# Patient Record
Sex: MALE | Race: WHITE | NOT HISPANIC OR LATINO | URBAN - METROPOLITAN AREA
[De-identification: names, ages, dates, MRNs, and addresses within clinical notes are randomized per-mention and may not be internally consistent; named-entity substitution may affect disease eponyms.]

---

## 2017-10-02 ENCOUNTER — EMERGENCY (EMERGENCY)
Facility: HOSPITAL | Age: 82
LOS: 0 days | Discharge: HOME | End: 2017-10-02
Attending: EMERGENCY MEDICINE | Admitting: EMERGENCY MEDICINE
Payer: MEDICARE

## 2017-10-02 VITALS
OXYGEN SATURATION: 98 % | HEART RATE: 95 BPM | SYSTOLIC BLOOD PRESSURE: 158 MMHG | RESPIRATION RATE: 18 BRPM | HEIGHT: 67 IN | WEIGHT: 153 LBS | TEMPERATURE: 98 F | DIASTOLIC BLOOD PRESSURE: 79 MMHG

## 2017-10-02 VITALS — WEIGHT: 179.9 LBS

## 2017-10-02 DIAGNOSIS — Z79.82 LONG TERM (CURRENT) USE OF ASPIRIN: ICD-10-CM

## 2017-10-02 DIAGNOSIS — S01.511A LACERATION WITHOUT FOREIGN BODY OF LIP, INITIAL ENCOUNTER: ICD-10-CM
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
## 2017-10-02 DIAGNOSIS — S01.81XA LACERATION WITHOUT FOREIGN BODY OF OTHER PART OF HEAD, INITIAL ENCOUNTER: ICD-10-CM
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
## 2017-10-02 PROCEDURE — 99285 EMERGENCY DEPT VISIT HI MDM: CPT

## 2017-10-02 PROCEDURE — 70450 CT HEAD/BRAIN W/O DYE: CPT | Mod: 26

## 2017-10-02 NOTE — ED ADULT NURSE NOTE - OBJECTIVE STATEMENT
pt c/o trip and fall onto sidewalk, denies LOC, +upper lip laceration, bleeding controlled, cid plastic sx @ bedside

## 2017-10-02 NOTE — ED STATDOCS - CARE PLAN
Principal Discharge DX:	Fall, initial encounter  Secondary Diagnosis:	Lip laceration, initial encounter  Secondary Diagnosis:	Facial injury, initial encounter

## 2017-10-02 NOTE — ED STATDOCS - OBJECTIVE STATEMENT
89M presents to ED s/p fall. Pt tripped and fell, landed on face. No loc, no back pain. +laceration lip vermillion border. Pain 3/10. No dental injuries.

## 2017-10-02 NOTE — ED STATDOCS - PROGRESS NOTE DETAILS
89 yo male presents s/p trip and fall and landing face first with a lac to the right upper lip. Dr. Bustillos in  ER to see pt. -Yvon Bilyl PA-C

## 2017-11-05 ENCOUNTER — EMERGENCY (EMERGENCY)
Facility: HOSPITAL | Age: 82
LOS: 1 days | Discharge: HOME | End: 2017-11-05
Attending: EMERGENCY MEDICINE | Admitting: EMERGENCY MEDICINE
Payer: MEDICARE

## 2017-11-05 VITALS
SYSTOLIC BLOOD PRESSURE: 169 MMHG | RESPIRATION RATE: 18 BRPM | OXYGEN SATURATION: 97 % | DIASTOLIC BLOOD PRESSURE: 91 MMHG | TEMPERATURE: 98 F | HEIGHT: 67 IN | WEIGHT: 153.22 LBS | HEART RATE: 105 BPM

## 2017-11-05 VITALS
RESPIRATION RATE: 16 BRPM | DIASTOLIC BLOOD PRESSURE: 78 MMHG | HEART RATE: 91 BPM | SYSTOLIC BLOOD PRESSURE: 127 MMHG | OXYGEN SATURATION: 97 % | TEMPERATURE: 98 F

## 2017-11-05 LAB
APPEARANCE UR: CLEAR — SIGNIFICANT CHANGE UP
BILIRUB UR-MCNC: NEGATIVE — SIGNIFICANT CHANGE UP
COLOR SPEC: YELLOW — SIGNIFICANT CHANGE UP
DIFF PNL FLD: NEGATIVE — SIGNIFICANT CHANGE UP
GLUCOSE UR QL: NEGATIVE — SIGNIFICANT CHANGE UP
KETONES UR-MCNC: NEGATIVE — SIGNIFICANT CHANGE UP
LEUKOCYTE ESTERASE UR-ACNC: NEGATIVE — SIGNIFICANT CHANGE UP
NITRITE UR-MCNC: NEGATIVE — SIGNIFICANT CHANGE UP
PH UR: 6.5 — SIGNIFICANT CHANGE UP (ref 5–8)
PROT UR-MCNC: NEGATIVE — SIGNIFICANT CHANGE UP
SP GR SPEC: 1.01 — SIGNIFICANT CHANGE UP (ref 1.01–1.02)
UROBILINOGEN FLD QL: NEGATIVE — SIGNIFICANT CHANGE UP

## 2017-11-05 PROCEDURE — 81003 URINALYSIS AUTO W/O SCOPE: CPT

## 2017-11-05 PROCEDURE — 99284 EMERGENCY DEPT VISIT MOD MDM: CPT

## 2017-11-05 PROCEDURE — 99284 EMERGENCY DEPT VISIT MOD MDM: CPT | Mod: 25

## 2017-11-05 PROCEDURE — 74176 CT ABD & PELVIS W/O CONTRAST: CPT

## 2017-11-05 PROCEDURE — 87086 URINE CULTURE/COLONY COUNT: CPT

## 2017-11-05 PROCEDURE — 74176 CT ABD & PELVIS W/O CONTRAST: CPT | Mod: 26

## 2017-11-05 NOTE — ED PROVIDER NOTE - PROGRESS NOTE DETAILS
bladder scan and ct scan reviwed, no acute findings, patient states he had blood work done recently and had normal renal function, patient in the ER able to urinate well, feels better, states he will f/u with his urologist

## 2017-11-05 NOTE — ED PROVIDER NOTE - OBJECTIVE STATEMENT
89 male presents to ER with wife c/o difficulty urinating, states he has urgency but poor flow and suprapubic discomfort, started last night, deneis fever, no vomiting. PAtient states he was recently diagnosed with enlarged prostate and started flomax.

## 2017-11-06 LAB
CULTURE RESULTS: NO GROWTH — SIGNIFICANT CHANGE UP
SPECIMEN SOURCE: SIGNIFICANT CHANGE UP

## 2018-07-26 ENCOUNTER — EMERGENCY (EMERGENCY)
Facility: HOSPITAL | Age: 83
LOS: 1 days | Discharge: HOME | End: 2018-07-26
Attending: EMERGENCY MEDICINE
Payer: MEDICARE

## 2018-07-26 VITALS
HEART RATE: 86 BPM | OXYGEN SATURATION: 99 % | HEIGHT: 67 IN | SYSTOLIC BLOOD PRESSURE: 190 MMHG | RESPIRATION RATE: 15 BRPM | TEMPERATURE: 98 F | WEIGHT: 153 LBS | DIASTOLIC BLOOD PRESSURE: 95 MMHG

## 2018-07-26 VITALS
SYSTOLIC BLOOD PRESSURE: 184 MMHG | OXYGEN SATURATION: 99 % | TEMPERATURE: 98 F | HEART RATE: 83 BPM | DIASTOLIC BLOOD PRESSURE: 90 MMHG | RESPIRATION RATE: 14 BRPM

## 2018-07-26 DIAGNOSIS — Z90.79 ACQUIRED ABSENCE OF OTHER GENITAL ORGAN(S): Chronic | ICD-10-CM

## 2018-07-26 PROCEDURE — 99282 EMERGENCY DEPT VISIT SF MDM: CPT

## 2018-07-26 PROCEDURE — 99283 EMERGENCY DEPT VISIT LOW MDM: CPT

## 2018-07-26 NOTE — ED ADULT NURSE NOTE - OBJECTIVE STATEMENT
Pt presents to the fast track area c/o urinary retention, lower abd pressure. Lower abd region soft.

## 2018-07-26 NOTE — ED PROVIDER NOTE - OBJECTIVE STATEMENT
89 yo white male with PHx of TURP, BPH and HTN who over the past few days has began to notice a reduction in urinary stream with difficulty voiding especially today. Was seen by his urologist Dr. Fraser this week who Rx AbXs. No fever or chills and no flank pain, dysuria or hematuria. 89 yo white male with PHx of TURP, BPH and HTN who over the past few days has began to notice a reduction in urinary stream with difficulty voiding especially today. Was seen by his urologist Dr. Cruz this week who Rx AbXs. No fever or chills and no flank pain, dysuria or hematuria.

## 2018-07-26 NOTE — ED PROVIDER NOTE - CONSTITUTIONAL, MLM
normal... Well appearing, elderly white male, well nourished, awake, alert, oriented to person, place, time/situation and in no apparent distress.

## 2018-07-27 PROBLEM — N40.0 BENIGN PROSTATIC HYPERPLASIA WITHOUT LOWER URINARY TRACT SYMPTOMS: Chronic | Status: ACTIVE | Noted: 2017-11-05

## 2018-07-27 PROBLEM — I10 ESSENTIAL (PRIMARY) HYPERTENSION: Chronic | Status: ACTIVE | Noted: 2017-11-05

## 2018-08-04 ENCOUNTER — EMERGENCY (EMERGENCY)
Facility: HOSPITAL | Age: 83
LOS: 1 days | Discharge: LEFT BEFORE DISCHARGE | End: 2018-08-04
Attending: EMERGENCY MEDICINE
Payer: MEDICARE

## 2018-08-04 VITALS
RESPIRATION RATE: 16 BRPM | SYSTOLIC BLOOD PRESSURE: 168 MMHG | HEIGHT: 67 IN | TEMPERATURE: 98 F | OXYGEN SATURATION: 96 % | WEIGHT: 154.98 LBS | HEART RATE: 85 BPM | DIASTOLIC BLOOD PRESSURE: 88 MMHG

## 2018-08-04 DIAGNOSIS — R33.9 RETENTION OF URINE, UNSPECIFIED: ICD-10-CM

## 2018-08-04 DIAGNOSIS — Z90.79 ACQUIRED ABSENCE OF OTHER GENITAL ORGAN(S): Chronic | ICD-10-CM

## 2018-08-04 DIAGNOSIS — I10 ESSENTIAL (PRIMARY) HYPERTENSION: ICD-10-CM

## 2018-08-04 DIAGNOSIS — N40.0 BENIGN PROSTATIC HYPERPLASIA WITHOUT LOWER URINARY TRACT SYMPTOMS: ICD-10-CM

## 2018-08-04 PROCEDURE — 99283 EMERGENCY DEPT VISIT LOW MDM: CPT

## 2018-08-04 PROCEDURE — 99282 EMERGENCY DEPT VISIT SF MDM: CPT

## 2018-08-04 NOTE — ED PROVIDER NOTE - PROGRESS NOTE DETAILS
Had an at length discussion with patient.  Patient wishes to leave at this time without urine or labs testing.  The patient understands that they are leaving against medical advice despite the risk of missing a potentially serious diagnosis which may lead to injury, disability and/or death.  I discussed with the patient which tests would need to be performed.  I was unable to convince patient to stay for further workup.  The patient was given an opportunity to ask any questions as well.   Patient didn't want to wait for discharge papers. The patient demonstrates understanding of all risks, is awake and alert and demonstrates competance to make medical decisions.  The patient understands that they may return at any time if desired. Discussed the importance of close, prompt medical follow-up

## 2018-08-04 NOTE — ED ADULT NURSE NOTE - OBJECTIVE STATEMENT
pt states he isnt making a lot of urine, state he has h/o turp x2 and BPH. pt bladder soft non distended mildly tender to palpation

## 2018-08-04 NOTE — ED ADULT NURSE NOTE - NSIMPLEMENTINTERV_GEN_ALL_ED
Implemented All Universal Safety Interventions:  [place] to call system. Call bell, personal items and telephone within reach. Instruct patient to call for assistance. Room bathroom lighting operational. Non-slip footwear when patient is off stretcher. Physically safe environment: no spills, clutter or unnecessary equipment. Stretcher in lowest position, wheels locked, appropriate side rails in place.

## 2018-08-04 NOTE — ED PROVIDER NOTE - OBJECTIVE STATEMENT
pt c/o decreased urination since yesterday, unable to urinate today. no fevers, chills, d/n/v, abd pain, dysuria, hematuria.  uro - cardenas

## 2018-08-04 NOTE — ED ADULT NURSE REASSESSMENT NOTE - NS ED NURSE REASSESS COMMENT FT1
bladder scan completed 100ml urine in the bladder pt states his urine output is low and he goes very little when he does go. MD Smith aware  awaiting orders

## 2018-08-11 ENCOUNTER — EMERGENCY (EMERGENCY)
Facility: HOSPITAL | Age: 83
LOS: 1 days | Discharge: HOME | End: 2018-08-11
Attending: EMERGENCY MEDICINE
Payer: MEDICARE

## 2018-08-11 VITALS
DIASTOLIC BLOOD PRESSURE: 78 MMHG | RESPIRATION RATE: 16 BRPM | OXYGEN SATURATION: 98 % | SYSTOLIC BLOOD PRESSURE: 148 MMHG | TEMPERATURE: 97 F

## 2018-08-11 VITALS
DIASTOLIC BLOOD PRESSURE: 80 MMHG | RESPIRATION RATE: 16 BRPM | SYSTOLIC BLOOD PRESSURE: 159 MMHG | WEIGHT: 151.9 LBS | TEMPERATURE: 98 F | OXYGEN SATURATION: 97 % | HEIGHT: 67 IN | HEART RATE: 88 BPM

## 2018-08-11 DIAGNOSIS — Z90.79 ACQUIRED ABSENCE OF OTHER GENITAL ORGAN(S): Chronic | ICD-10-CM

## 2018-08-11 LAB
APPEARANCE UR: CLEAR — SIGNIFICANT CHANGE UP
BILIRUB UR-MCNC: NEGATIVE — SIGNIFICANT CHANGE UP
COLOR SPEC: YELLOW — SIGNIFICANT CHANGE UP
DIFF PNL FLD: ABNORMAL
GLUCOSE UR QL: NEGATIVE — SIGNIFICANT CHANGE UP
KETONES UR-MCNC: NEGATIVE — SIGNIFICANT CHANGE UP
LEUKOCYTE ESTERASE UR-ACNC: NEGATIVE — SIGNIFICANT CHANGE UP
NITRITE UR-MCNC: NEGATIVE — SIGNIFICANT CHANGE UP
PH UR: 5 — SIGNIFICANT CHANGE UP (ref 5–8)
PROT UR-MCNC: 25 MG/DL
SP GR SPEC: 1.02 — SIGNIFICANT CHANGE UP (ref 1.01–1.02)
UROBILINOGEN FLD QL: NEGATIVE — SIGNIFICANT CHANGE UP

## 2018-08-11 PROCEDURE — 99284 EMERGENCY DEPT VISIT MOD MDM: CPT

## 2018-08-11 PROCEDURE — 87086 URINE CULTURE/COLONY COUNT: CPT

## 2018-08-11 PROCEDURE — 99282 EMERGENCY DEPT VISIT SF MDM: CPT

## 2018-08-11 PROCEDURE — 81001 URINALYSIS AUTO W/SCOPE: CPT

## 2018-08-11 NOTE — ED PROVIDER NOTE - OBJECTIVE STATEMENT
89 yo white male with feeling of urgency on and off x weeks, Scheduled to see Urologist on Monday. No dysuria and no F/C/N/V/D.

## 2018-08-11 NOTE — ED ADULT NURSE NOTE - NSIMPLEMENTINTERV_GEN_ALL_ED
Implemented All Universal Safety Interventions:  [place] to call system. Call bell, personal items and telephone within reach. Instruct patient to call for assistance. Room bathroom lighting operational. Non-slip footwear when patient is off stretcher. Physically safe environment: no spills, clutter or unnecessary equipment. Stretcher in lowest position, wheels locked, appropriate side rails in place.

## 2018-08-12 LAB
CULTURE RESULTS: NO GROWTH — SIGNIFICANT CHANGE UP
SPECIMEN SOURCE: SIGNIFICANT CHANGE UP

## 2018-10-18 NOTE — ED PROVIDER NOTE - NS ED ATTENDING STATEMENT MOD
See hospital course. Suspect viral meningitis but work up still in progress. Slowly improving.   Attending Only

## 2018-10-30 ENCOUNTER — APPOINTMENT (OUTPATIENT)
Dept: OTOLARYNGOLOGY | Facility: CLINIC | Age: 83
End: 2018-10-30
Payer: MEDICARE

## 2018-10-30 VITALS
WEIGHT: 152 LBS | BODY MASS INDEX: 23.86 KG/M2 | HEIGHT: 67 IN | SYSTOLIC BLOOD PRESSURE: 115 MMHG | HEART RATE: 76 BPM | DIASTOLIC BLOOD PRESSURE: 68 MMHG

## 2018-10-30 DIAGNOSIS — H61.20 IMPACTED CERUMEN, UNSPECIFIED EAR: ICD-10-CM

## 2018-10-30 DIAGNOSIS — Z86.39 PERSONAL HISTORY OF OTHER ENDOCRINE, NUTRITIONAL AND METABOLIC DISEASE: ICD-10-CM

## 2018-10-30 DIAGNOSIS — Z78.9 OTHER SPECIFIED HEALTH STATUS: ICD-10-CM

## 2018-10-30 DIAGNOSIS — Z82.49 FAMILY HISTORY OF ISCHEMIC HEART DISEASE AND OTHER DISEASES OF THE CIRCULATORY SYSTEM: ICD-10-CM

## 2018-10-30 DIAGNOSIS — Z80.9 FAMILY HISTORY OF MALIGNANT NEOPLASM, UNSPECIFIED: ICD-10-CM

## 2018-10-30 PROCEDURE — 99213 OFFICE O/P EST LOW 20 MIN: CPT

## 2018-10-30 RX ORDER — NIFEDIPINE 10 MG/1
10 CAPSULE ORAL
Refills: 0 | Status: ACTIVE | COMMUNITY

## 2018-10-30 RX ORDER — TAMSULOSIN HYDROCHLORIDE 0.4 MG/1
0.4 CAPSULE ORAL
Refills: 0 | Status: ACTIVE | COMMUNITY

## 2018-10-30 RX ORDER — ATORVASTATIN CALCIUM 80 MG/1
TABLET, FILM COATED ORAL
Refills: 0 | Status: ACTIVE | COMMUNITY

## 2018-10-30 RX ORDER — LOSARTAN POTASSIUM AND HYDROCHLOROTHIAZIDE 12.5; 5 MG/1; MG/1
50-12.5 TABLET ORAL
Refills: 0 | Status: ACTIVE | COMMUNITY

## 2018-10-30 RX ORDER — FINASTERIDE 1 MG/1
TABLET ORAL
Refills: 0 | Status: ACTIVE | COMMUNITY

## 2019-04-03 ENCOUNTER — APPOINTMENT (OUTPATIENT)
Dept: OTOLARYNGOLOGY | Facility: CLINIC | Age: 84
End: 2019-04-03
Payer: MEDICARE

## 2019-04-03 VITALS
HEART RATE: 87 BPM | HEIGHT: 67 IN | BODY MASS INDEX: 23.86 KG/M2 | DIASTOLIC BLOOD PRESSURE: 86 MMHG | SYSTOLIC BLOOD PRESSURE: 141 MMHG | WEIGHT: 152 LBS

## 2019-04-03 DIAGNOSIS — J06.9 ACUTE UPPER RESPIRATORY INFECTION, UNSPECIFIED: ICD-10-CM

## 2019-04-03 DIAGNOSIS — J32.2 CHRONIC ETHMOIDAL SINUSITIS: ICD-10-CM

## 2019-04-03 DIAGNOSIS — R05 COUGH: ICD-10-CM

## 2019-04-03 PROCEDURE — 99213 OFFICE O/P EST LOW 20 MIN: CPT | Mod: 25

## 2019-04-03 PROCEDURE — 31231 NASAL ENDOSCOPY DX: CPT

## 2019-04-03 RX ORDER — METOPROLOL SUCCINATE 50 MG/1
50 TABLET, EXTENDED RELEASE ORAL
Qty: 90 | Refills: 0 | Status: ACTIVE | COMMUNITY
Start: 2018-10-05

## 2019-04-03 RX ORDER — OLOPATADINE HCL 1 MG/ML
0.1 SOLUTION/ DROPS OPHTHALMIC
Qty: 5 | Refills: 0 | Status: ACTIVE | COMMUNITY
Start: 2018-12-28

## 2019-04-03 RX ORDER — NIFEDIPINE 60 MG/1
60 TABLET, FILM COATED, EXTENDED RELEASE ORAL
Qty: 90 | Refills: 0 | Status: ACTIVE | COMMUNITY
Start: 2019-03-04

## 2019-04-03 NOTE — PROCEDURE
[FreeTextEntry6] : scope # 13\par Mild septal deviation is present on direct visualization\par The inferior nasal turbinates are moderate in size. \par The sinus endoscope was introduced into the right nares\par exam right middle meatus reveals no mucopus, polyps or inflammation.\par The scope was advanced and the sphenoethmoid region was inspected. left sphenoidotmy patent and clear\par The superior meatus and nasal vault are unremarkable.  The nasopharynx is unremarkable without inflammation or mass\par The sinus endoscope was introduced into the left nares\par exam of the left middle meatus reveals no mucopus, polyps or inflammation\par The scope was advanced and the sphenoethmoid region was inspected.\par The left superior meatus and nasal vault are unremarkable.\par

## 2019-04-03 NOTE — REVIEW OF SYSTEMS
[Cough] : cough [Negative] : Heme/Lymph [Patient Intake Form Reviewed] : Patient intake form was reviewed

## 2019-04-03 NOTE — PHYSICAL EXAM
[Nasal Endoscopy Performed] : nasal endoscopy was performed, see procedure section for findings [Midline] : trachea located in midline position [Normal] : no rashes [de-identified] : lungs clear to ascultation

## 2019-06-20 ENCOUNTER — INPATIENT (INPATIENT)
Facility: HOSPITAL | Age: 84
LOS: 9 days | Discharge: HOME | DRG: 329 | End: 2019-06-30
Attending: FAMILY MEDICINE | Admitting: INTERNAL MEDICINE
Payer: MEDICARE

## 2019-06-20 ENCOUNTER — TRANSCRIPTION ENCOUNTER (OUTPATIENT)
Age: 84
End: 2019-06-20

## 2019-06-20 VITALS
DIASTOLIC BLOOD PRESSURE: 72 MMHG | RESPIRATION RATE: 14 BRPM | OXYGEN SATURATION: 95 % | TEMPERATURE: 98 F | SYSTOLIC BLOOD PRESSURE: 131 MMHG | HEART RATE: 105 BPM | HEIGHT: 67 IN | WEIGHT: 151.02 LBS

## 2019-06-20 DIAGNOSIS — K56.2 VOLVULUS: ICD-10-CM

## 2019-06-20 DIAGNOSIS — Z98.890 OTHER SPECIFIED POSTPROCEDURAL STATES: Chronic | ICD-10-CM

## 2019-06-20 DIAGNOSIS — R18.8 OTHER ASCITES: ICD-10-CM

## 2019-06-20 DIAGNOSIS — I25.10 ATHEROSCLEROTIC HEART DISEASE OF NATIVE CORONARY ARTERY WITHOUT ANGINA PECTORIS: ICD-10-CM

## 2019-06-20 DIAGNOSIS — I10 ESSENTIAL (PRIMARY) HYPERTENSION: ICD-10-CM

## 2019-06-20 DIAGNOSIS — E87.1 HYPO-OSMOLALITY AND HYPONATREMIA: ICD-10-CM

## 2019-06-20 DIAGNOSIS — Z90.79 ACQUIRED ABSENCE OF OTHER GENITAL ORGAN(S): Chronic | ICD-10-CM

## 2019-06-20 DIAGNOSIS — E78.5 HYPERLIPIDEMIA, UNSPECIFIED: ICD-10-CM

## 2019-06-20 DIAGNOSIS — J90 PLEURAL EFFUSION, NOT ELSEWHERE CLASSIFIED: ICD-10-CM

## 2019-06-20 DIAGNOSIS — Z29.9 ENCOUNTER FOR PROPHYLACTIC MEASURES, UNSPECIFIED: ICD-10-CM

## 2019-06-20 DIAGNOSIS — R10.9 UNSPECIFIED ABDOMINAL PAIN: ICD-10-CM

## 2019-06-20 DIAGNOSIS — K46.9 UNSPECIFIED ABDOMINAL HERNIA WITHOUT OBSTRUCTION OR GANGRENE: Chronic | ICD-10-CM

## 2019-06-20 DIAGNOSIS — N40.0 BENIGN PROSTATIC HYPERPLASIA WITHOUT LOWER URINARY TRACT SYMPTOMS: ICD-10-CM

## 2019-06-20 LAB
ALBUMIN SERPL ELPH-MCNC: 3.2 G/DL — LOW (ref 3.3–5)
ALP SERPL-CCNC: 64 U/L — SIGNIFICANT CHANGE UP (ref 40–120)
ALT FLD-CCNC: 52 U/L — SIGNIFICANT CHANGE UP (ref 12–78)
ANION GAP SERPL CALC-SCNC: 8 MMOL/L — SIGNIFICANT CHANGE UP (ref 5–17)
APPEARANCE UR: CLEAR — SIGNIFICANT CHANGE UP
APTT BLD: 25.9 SEC — LOW (ref 28.5–37)
AST SERPL-CCNC: 48 U/L — HIGH (ref 15–37)
BASOPHILS # BLD AUTO: 0.01 K/UL — SIGNIFICANT CHANGE UP (ref 0–0.2)
BASOPHILS NFR BLD AUTO: 0.1 % — SIGNIFICANT CHANGE UP (ref 0–2)
BILIRUB SERPL-MCNC: 0.6 MG/DL — SIGNIFICANT CHANGE UP (ref 0.2–1.2)
BILIRUB UR-MCNC: NEGATIVE — SIGNIFICANT CHANGE UP
BUN SERPL-MCNC: 37 MG/DL — HIGH (ref 7–23)
CALCIUM SERPL-MCNC: 8.8 MG/DL — SIGNIFICANT CHANGE UP (ref 8.5–10.1)
CHLORIDE SERPL-SCNC: 94 MMOL/L — LOW (ref 96–108)
CK MB CFR SERPL CALC: 5 NG/ML — HIGH (ref 0–3.6)
CO2 SERPL-SCNC: 27 MMOL/L — SIGNIFICANT CHANGE UP (ref 22–31)
COLOR SPEC: YELLOW — SIGNIFICANT CHANGE UP
CREAT SERPL-MCNC: 1.1 MG/DL — SIGNIFICANT CHANGE UP (ref 0.5–1.3)
DIFF PNL FLD: ABNORMAL
EOSINOPHIL # BLD AUTO: 0.01 K/UL — SIGNIFICANT CHANGE UP (ref 0–0.5)
EOSINOPHIL NFR BLD AUTO: 0.1 % — SIGNIFICANT CHANGE UP (ref 0–6)
GLUCOSE SERPL-MCNC: 107 MG/DL — HIGH (ref 70–99)
GLUCOSE UR QL: NEGATIVE — SIGNIFICANT CHANGE UP
HCT VFR BLD CALC: 35.5 % — LOW (ref 39–50)
HGB BLD-MCNC: 12.1 G/DL — LOW (ref 13–17)
IMM GRANULOCYTES NFR BLD AUTO: 0.3 % — SIGNIFICANT CHANGE UP (ref 0–1.5)
INR BLD: 1.01 RATIO — SIGNIFICANT CHANGE UP (ref 0.88–1.16)
KETONES UR-MCNC: NEGATIVE — SIGNIFICANT CHANGE UP
LACTATE SERPL-SCNC: 1.1 MMOL/L — SIGNIFICANT CHANGE UP (ref 0.7–2)
LEUKOCYTE ESTERASE UR-ACNC: NEGATIVE — SIGNIFICANT CHANGE UP
LIDOCAIN IGE QN: 110 U/L — SIGNIFICANT CHANGE UP (ref 73–393)
LYMPHOCYTES # BLD AUTO: 0.89 K/UL — LOW (ref 1–3.3)
LYMPHOCYTES # BLD AUTO: 8.6 % — LOW (ref 13–44)
MCHC RBC-ENTMCNC: 32.2 PG — SIGNIFICANT CHANGE UP (ref 27–34)
MCHC RBC-ENTMCNC: 34.1 GM/DL — SIGNIFICANT CHANGE UP (ref 32–36)
MCV RBC AUTO: 94.4 FL — SIGNIFICANT CHANGE UP (ref 80–100)
MONOCYTES # BLD AUTO: 0.81 K/UL — SIGNIFICANT CHANGE UP (ref 0–0.9)
MONOCYTES NFR BLD AUTO: 7.9 % — SIGNIFICANT CHANGE UP (ref 2–14)
NEUTROPHILS # BLD AUTO: 8.54 K/UL — HIGH (ref 1.8–7.4)
NEUTROPHILS NFR BLD AUTO: 83 % — HIGH (ref 43–77)
NITRITE UR-MCNC: NEGATIVE — SIGNIFICANT CHANGE UP
NRBC # BLD: 0 /100 WBCS — SIGNIFICANT CHANGE UP (ref 0–0)
NT-PROBNP SERPL-SCNC: 758 PG/ML — HIGH (ref 0–450)
PH UR: 6.5 — SIGNIFICANT CHANGE UP (ref 5–8)
PLATELET # BLD AUTO: 234 K/UL — SIGNIFICANT CHANGE UP (ref 150–400)
POTASSIUM SERPL-MCNC: 4.2 MMOL/L — SIGNIFICANT CHANGE UP (ref 3.5–5.3)
POTASSIUM SERPL-SCNC: 4.2 MMOL/L — SIGNIFICANT CHANGE UP (ref 3.5–5.3)
PROT SERPL-MCNC: 7.3 G/DL — SIGNIFICANT CHANGE UP (ref 6–8.3)
PROT UR-MCNC: 25 MG/DL
PROTHROM AB SERPL-ACNC: 11.5 SEC — SIGNIFICANT CHANGE UP (ref 10–12.9)
RBC # BLD: 3.76 M/UL — LOW (ref 4.2–5.8)
RBC # FLD: 14 % — SIGNIFICANT CHANGE UP (ref 10.3–14.5)
SODIUM SERPL-SCNC: 129 MMOL/L — LOW (ref 135–145)
SP GR SPEC: 1 — LOW (ref 1.01–1.02)
TROPONIN I SERPL-MCNC: <.015 NG/ML — SIGNIFICANT CHANGE UP (ref 0.01–0.04)
UROBILINOGEN FLD QL: NEGATIVE — SIGNIFICANT CHANGE UP
WBC # BLD: 10.29 K/UL — SIGNIFICANT CHANGE UP (ref 3.8–10.5)
WBC # FLD AUTO: 10.29 K/UL — SIGNIFICANT CHANGE UP (ref 3.8–10.5)

## 2019-06-20 PROCEDURE — 93010 ELECTROCARDIOGRAM REPORT: CPT

## 2019-06-20 PROCEDURE — 99285 EMERGENCY DEPT VISIT HI MDM: CPT

## 2019-06-20 PROCEDURE — 71045 X-RAY EXAM CHEST 1 VIEW: CPT | Mod: 26

## 2019-06-20 PROCEDURE — 74177 CT ABD & PELVIS W/CONTRAST: CPT | Mod: 26

## 2019-06-20 PROCEDURE — 99223 1ST HOSP IP/OBS HIGH 75: CPT | Mod: GC,AI

## 2019-06-20 RX ORDER — CEFTRIAXONE 500 MG/1
1000 INJECTION, POWDER, FOR SOLUTION INTRAMUSCULAR; INTRAVENOUS ONCE
Refills: 0 | Status: COMPLETED | OUTPATIENT
Start: 2019-06-20 | End: 2019-06-20

## 2019-06-20 RX ORDER — MORPHINE SULFATE 50 MG/1
1 CAPSULE, EXTENDED RELEASE ORAL EVERY 6 HOURS
Refills: 0 | Status: DISCONTINUED | OUTPATIENT
Start: 2019-06-20 | End: 2019-06-21

## 2019-06-20 RX ORDER — ATORVASTATIN CALCIUM 80 MG/1
0 TABLET, FILM COATED ORAL
Qty: 0 | Refills: 0 | DISCHARGE

## 2019-06-20 RX ORDER — SODIUM CHLORIDE 9 MG/ML
1000 INJECTION INTRAMUSCULAR; INTRAVENOUS; SUBCUTANEOUS
Refills: 0 | Status: DISCONTINUED | OUTPATIENT
Start: 2019-06-20 | End: 2019-06-21

## 2019-06-20 RX ORDER — NIFEDIPINE 30 MG
0 TABLET, EXTENDED RELEASE 24 HR ORAL
Qty: 0 | Refills: 0 | DISCHARGE

## 2019-06-20 RX ORDER — AZITHROMYCIN 500 MG/1
500 TABLET, FILM COATED ORAL ONCE
Refills: 0 | Status: COMPLETED | OUTPATIENT
Start: 2019-06-20 | End: 2019-06-20

## 2019-06-20 RX ORDER — ACETAMINOPHEN 500 MG
650 TABLET ORAL EVERY 6 HOURS
Refills: 0 | Status: DISCONTINUED | OUTPATIENT
Start: 2019-06-20 | End: 2019-06-21

## 2019-06-20 RX ORDER — TAMSULOSIN HYDROCHLORIDE 0.4 MG/1
0.4 CAPSULE ORAL AT BEDTIME
Refills: 0 | Status: DISCONTINUED | OUTPATIENT
Start: 2019-06-20 | End: 2019-06-21

## 2019-06-20 RX ORDER — NIFEDIPINE 30 MG
60 TABLET, EXTENDED RELEASE 24 HR ORAL DAILY
Refills: 0 | Status: DISCONTINUED | OUTPATIENT
Start: 2019-06-20 | End: 2019-06-21

## 2019-06-20 RX ORDER — LOSARTAN POTASSIUM 100 MG/1
0 TABLET, FILM COATED ORAL
Qty: 0 | Refills: 0 | DISCHARGE

## 2019-06-20 RX ORDER — ASPIRIN/CALCIUM CARB/MAGNESIUM 324 MG
81 TABLET ORAL DAILY
Refills: 0 | Status: DISCONTINUED | OUTPATIENT
Start: 2019-06-20 | End: 2019-06-20

## 2019-06-20 RX ORDER — FINASTERIDE 5 MG/1
0 TABLET, FILM COATED ORAL
Qty: 0 | Refills: 0 | DISCHARGE

## 2019-06-20 RX ORDER — METOPROLOL TARTRATE 50 MG
0 TABLET ORAL
Qty: 0 | Refills: 0 | DISCHARGE

## 2019-06-20 RX ORDER — METOPROLOL TARTRATE 50 MG
50 TABLET ORAL DAILY
Refills: 0 | Status: DISCONTINUED | OUTPATIENT
Start: 2019-06-20 | End: 2019-06-21

## 2019-06-20 RX ORDER — FINASTERIDE 5 MG/1
5 TABLET, FILM COATED ORAL DAILY
Refills: 0 | Status: DISCONTINUED | OUTPATIENT
Start: 2019-06-20 | End: 2019-06-21

## 2019-06-20 RX ORDER — HEPARIN SODIUM 5000 [USP'U]/ML
5000 INJECTION INTRAVENOUS; SUBCUTANEOUS EVERY 8 HOURS
Refills: 0 | Status: DISCONTINUED | OUTPATIENT
Start: 2019-06-20 | End: 2019-06-20

## 2019-06-20 RX ORDER — ATORVASTATIN CALCIUM 80 MG/1
80 TABLET, FILM COATED ORAL AT BEDTIME
Refills: 0 | Status: DISCONTINUED | OUTPATIENT
Start: 2019-06-20 | End: 2019-06-21

## 2019-06-20 RX ORDER — ONDANSETRON 8 MG/1
4 TABLET, FILM COATED ORAL EVERY 6 HOURS
Refills: 0 | Status: DISCONTINUED | OUTPATIENT
Start: 2019-06-20 | End: 2019-06-21

## 2019-06-20 RX ADMIN — TAMSULOSIN HYDROCHLORIDE 0.4 MILLIGRAM(S): 0.4 CAPSULE ORAL at 23:35

## 2019-06-20 RX ADMIN — CEFTRIAXONE 1000 MILLIGRAM(S): 500 INJECTION, POWDER, FOR SOLUTION INTRAMUSCULAR; INTRAVENOUS at 18:24

## 2019-06-20 RX ADMIN — AZITHROMYCIN 500 MILLIGRAM(S): 500 TABLET, FILM COATED ORAL at 19:30

## 2019-06-20 RX ADMIN — SODIUM CHLORIDE 80 MILLILITER(S): 9 INJECTION INTRAMUSCULAR; INTRAVENOUS; SUBCUTANEOUS at 23:38

## 2019-06-20 RX ADMIN — CEFTRIAXONE 100 MILLIGRAM(S): 500 INJECTION, POWDER, FOR SOLUTION INTRAMUSCULAR; INTRAVENOUS at 17:54

## 2019-06-20 RX ADMIN — AZITHROMYCIN 255 MILLIGRAM(S): 500 TABLET, FILM COATED ORAL at 18:25

## 2019-06-20 RX ADMIN — ATORVASTATIN CALCIUM 80 MILLIGRAM(S): 80 TABLET, FILM COATED ORAL at 23:37

## 2019-06-20 NOTE — ED PROVIDER NOTE - OBJECTIVE STATEMENT
89yo M h/o cad, cabg, htn p/w lack of appetite w/ nausea x 5 days. denies f/c/v/d. pt reports some constipation.

## 2019-06-20 NOTE — H&P ADULT - NSHPSOCIALHISTORY_GEN_ALL_CORE
Lives at home with his wife. Ambulates independently and can perform all ADLs.   Tobacco: never  Etoh: in moderation

## 2019-06-20 NOTE — ED ADULT NURSE NOTE - OBJECTIVE STATEMENT
received pt in bed #16b Pt A&O c/o abd pain, decreased appetite & nausea since Sunday night "I think I ate something bad...I took a fleets enema this morning & had a BM to make sure that wasn't the problem"

## 2019-06-20 NOTE — ED PROVIDER NOTE - NS ED ROS FT
GEN: awake, alert, well appearing, NAD   HENT: atraumatic, normocephalic, MARTA, EOMI, no midline instability, oropharynx w/o erythema or exudates, no lymphadenopathy  CV: normal rate and rhythm, S1, S2, no MRG, equal pulses throughout, no JVD  RESP: no distress, no IWOB, no retraction, clear to auscultation bilaterally   ABD: soft, nontender, nondistended, no rebound, no guarding, normoactive bowel sounds, no organomegally  MUSCULOSKELETAL: strenght 5/5 x 4, full range of motion, CMS intact   SKIN: normal color, no turgor, no wounds or rash   NEURO: Awake alert oriented x 3, no facial asymmetry, no slurred speech, no pronator drift, moving all extremities  PSYCH: no suicial ideation, no homicidal ideation, no depression, no anxiety, no hallucination

## 2019-06-20 NOTE — ED ADULT NURSE NOTE - NSIMPLEMENTINTERV_GEN_ALL_ED
Implemented All Universal Safety Interventions:  [place] to call system. Call bell, personal items and telephone within reach. Instruct patient to call for assistance. Room bathroom lighting operational. Non-slip footwear when patient is off stretcher. Physically safe environment: no spills, clutter or unnecessary equipment. Stretcher in lowest position, wheels locked, appropriate side rails in place.

## 2019-06-20 NOTE — H&P ADULT - PROBLEM SELECTOR PLAN 3
s/p CABG, 1995  - Continue asa 81mg  - Continue Toprol XL 50mg qd with hold parameters  - Cardio consult for optimization s/p CABG, 1995  - will hold ASA in event of patient needs surgical procedure.   - Continue Toprol XL 50mg qd with hold parameters  - Cardio consult ([facility] consulted) for optimization

## 2019-06-20 NOTE — H&P ADULT - PROBLEM SELECTOR PLAN 1
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 QTc 454  - Pain management with Tylenol and morphine prn  - Surgical consult- Dr. Tse

## 2019-06-20 NOTE — H&P ADULT - NSHPREVIEWOFSYSTEMS_GEN_ALL_CORE
Constitutional: denies fever, chills, diaphoresis   HEENT: denies blurry vision, difficulty hearing  Respiratory: denies SOB, JUAREZ, cough, sputum production, wheezing, hemoptysis  Cardiovascular: denies CP, palpitations, edema  Gastrointestinal: +abdominal pain, +nausea, denies vomiting, diarrhea, constipation   Genitourinary: +increased frequency, denies urgency, hematuria   Skin/Breast: denies rash, itching  Musculoskeletal: denies myalgias, joint swelling, muscle weakness  Neurologic: denies headache, weakness, dizziness, paresthesias, numbness/tingling  Psychiatric: denies feeling anxious, depressed Constitutional: denies fever, chills, diaphoresis   HEENT: denies blurry vision, difficulty hearing  Respiratory: denies SOB, JUAREZ, cough, sputum production, wheezing, hemoptysis  Cardiovascular: denies CP, palpitations, edema  Gastrointestinal: +abdominal pain, +nausea, denies vomiting, diarrhea, constipation, melena, hematochezia  Genitourinary: +increased frequency, denies urgency, hematuria   Skin/Breast: denies rash, itching  Musculoskeletal: denies myalgias, joint swelling, muscle weakness  Neurologic: denies headache, weakness, dizziness, paresthesias, numbness/tingling  Psychiatric: denies feeling anxious, depressed

## 2019-06-20 NOTE — H&P ADULT - ATTENDING COMMENTS
Agree with the above. Pt is a 89 yo M presenting with abdominal pain found to have  The cecum is dilated and folded on itself in the right upper   quadrant, changed in location from prior CT.   Patient abd pain improved. Dr. Tse of surgery notified and will evaluate patient. No urgent surgical intervention planned.   Denies headaches,  chest pain, SOB, palpitations, constipation, diarrhea, melena, hematochezia, dysuria. Had constipation previously, but used an fleet enema and had large BM today, passing flatus.     T(C): 37.2 (06-20-19 @ 20:50), Max: 37.3 (06-20-19 @ 18:00)  HR: 111 (06-20-19 @ 20:50) (105 - 118)  BP: 126/77 (06-20-19 @ 20:50) (126/77 - 132/71)  RR: 16 (06-20-19 @ 20:50) (14 - 16)  SpO2: 92% (06-20-19 @ 20:50) (92% - 95%)  Wt(kg): --    Physical Exam:   GENERAL: well-groomed, well-developed, NAD  HEENT: head NC/AT; EOM intact, conjunctiva & sclera clear; hearing grossly intact, moist mucous membranes  NECK: supple, no JVD  RESPIRATORY: CTA B/L, no wheezing, rales, rhonchi or rubs  CARDIOVASCULAR: S1&S2, RRR, no murmurs or gallops  ABDOMEN: soft, non-tender, non-distended, + Bowel sounds x4 quadrants, no guarding, rebound or rigidity  MUSCULOSKELETAL:  no clubbing, cyanosis or edema of all 4 extremities  LYMPH: no cervical lymphadenopathy  VASCULAR: Radial pulses 2+ bilaterally, no varicose veins   SKIN: warm and dry, color normal  NEUROLOGIC: AA&O X3, CN2-12 intact w/ no focal deficits, no sensory loss, motor Strength 5/5 in UE & LE B/L  Psych: Normal mood and affect, normal behavior    Plan:   Cecal volvulus: normal lactate, benign abd exam. CT scan reviewed. Dr. Tse of surgery to see patient.   -cardiac consult for hx of CAD  Anemia: no signs or symptoms of active bleeding. Continue to monitor hgb.   Hyponatremia: gentle IVF. Repeat in AM.   Remainder of problems as above. Agree with the above. Pt is a 89 yo M presenting with abdominal pain found to have  The cecum is dilated and folded on itself in the right upper   quadrant, changed in location from prior CT.   Patient abd pain improved. Dr. Tse of surgery notified and will evaluate patient. No urgent surgical intervention planned.   Denies headaches,  chest pain, SOB, palpitations, constipation, diarrhea, melena, hematochezia, dysuria. Had constipation previously, but used an fleet enema and had large BM today, passing flatus.     T(C): 37.2 (06-20-19 @ 20:50), Max: 37.3 (06-20-19 @ 18:00)  HR: 111 (06-20-19 @ 20:50) (105 - 118)  BP: 126/77 (06-20-19 @ 20:50) (126/77 - 132/71)  RR: 16 (06-20-19 @ 20:50) (14 - 16)  SpO2: 92% (06-20-19 @ 20:50) (92% - 95%)  Wt(kg): --    Physical Exam:   GENERAL: well-groomed, well-developed, NAD  HEENT: head NC/AT; EOM intact, conjunctiva & sclera clear; hearing grossly intact, moist mucous membranes  NECK: supple, no JVD  RESPIRATORY: CTA B/L, no wheezing, rales, rhonchi or rubs  CARDIOVASCULAR: S1&S2, RRR, no murmurs or gallops  ABDOMEN: soft, non-tender, non-distended, + Bowel sounds x4 quadrants, no guarding, rebound or rigidity  MUSCULOSKELETAL:  no clubbing, cyanosis or edema of all 4 extremities  LYMPH: no cervical lymphadenopathy  VASCULAR: Radial pulses 2+ bilaterally, no varicose veins   SKIN: warm and dry, color normal  NEUROLOGIC: AA&O X3, CN2-12 intact w/ no focal deficits, no sensory loss, motor Strength 5/5 in UE & LE B/L  Psych: Normal mood and affect, normal behavior    Plan:   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 CT scan reviewed. Dr. Tse of surgery to see patient tonight. Discussed with Dr. Tse.   -cardiac consult for hx of CAD  Anemia: no signs or symptoms of active bleeding. Continue to monitor hgb.   Hyponatremia: gentle IVF. Repeat in AM.   Remainder of problems as above.

## 2019-06-20 NOTE — ED PROVIDER NOTE - CLINICAL SUMMARY MEDICAL DECISION MAKING FREE TEXT BOX
abd distension,   ct shows large Bowel obstruction, markedly dilated cecum, concerning for underlying mass, sx consulted, will place ngt  pt admitted

## 2019-06-20 NOTE — H&P ADULT - NSICDXPASTMEDICALHX_GEN_ALL_CORE_FT
PAST MEDICAL HISTORY:  Anginal pain     CAD (coronary artery disease) s/p CABG, 1995    Enlarged prostate     HTN (hypertension)

## 2019-06-20 NOTE — H&P ADULT - HISTORY OF PRESENT ILLNESS
89M w/pmh of CAD s/p CABG (1995), b/l inguinal hernia repairs, enlarged prostate s/p TURP x2, HTN and HLD presenting with abdominal pain for 4 days. States that after dinner on Sunday, he had a sharp, periumbilical pain with associated nausea and decreased appetite. He thought that he had eaten something that upset his stomach. He takes Tylenol at night for prostate discomfort. Over the past few days, his abdominal pain has improved to a dull ache, 3/10 in severity, nonradiating located around umbilicus. He had constipation up until this morning when he had a large bm after a fleet enema. He is able to pass gas.  Denies fevers/chills, chest pain, palpitations, sob, dysuria, edema or weakness. Patient's last colonoscopy was 7 years ago, wnl.     In the ED, patient's vitals were: T98.5F, , /72, RR 14 and SpO2 95% on room air. Significant labs include: H/H 12.1/35.5, Na 129, trops neg x1, pro-. UA wnl. Pt received Azithromycin and Ceftriaxone.   CT A/P: he cecum is dilated and folded on itself in the right upper quadrant, changed in location from prior CT. There is wall thickening involving the terminal ileum, cecum and proximal ascending colon. Appendix is distended and air-filled measuring up to 1.3 cm at the tip.   CXR: No change heart mediastinum. Asymmetric elevation right hemidiaphragm. There is left basilar retrocardiac atelectasis/infiltrate. Correlate for active infection. Small left pleural effusion. Distended bowel visualized upper abdomen.  EKG: sinus tachycardia, , no ST elevation/depression

## 2019-06-20 NOTE — ED ADULT NURSE NOTE - CHPI ED NUR SYMPTOMS NEG
no dysuria/no hematuria/no fever/no burning urination/no chills/no abdominal distension/no blood in stool/no vomiting/no diarrhea

## 2019-06-20 NOTE — H&P ADULT - PROBLEM SELECTOR PLAN 4
CT A/P showed small right upper quadrant ascites.  - Consider CHF as proBNP elevated 758 CT A/P showed small right upper quadrant ascites.  - Consider CHF as proBNP elevated 758  - Check ECHO

## 2019-06-20 NOTE — PATIENT PROFILE ADULT - ARE SIGNIFICANT INDICATORS COMPLETE.
Daily Note     Today's date: 2018  Patient name: Michela Hoyos  : 1979  MRN: [id number]  Referring provider: Tomás Mariscal DO  Dx:   Encounter Diagnosis     ICD-10-CM    1  Strain of lumbar region, initial encounter S39 012A    2  Lumbar strain, initial encounter S39 012A                   Subjective:Pt reports minimal LBP at the start of therapy today  Objective: See treatment diary below  Updated home program       Assessment: Held some TE and MHP due to time constraints and pt late arrival   Tolerated all TE without c/o increased pain  Pt would benefit from continued therapy to maximize pain relief, and improve flexibility  Plan: Continue per plan of care  Progress treatment as tolerated           Precautions: none    Daily Treatment Diary  Manuals  6/1  6/4  6/11  6/18  6/25  7/11  8/3  8/10  8/16     HS - D/C, piriformis stretch  AN    NP  AN  RK  nv  AN  EV  nv                                                                             Exercise Diary                        Bike 7 min 7 min NP 8 min 9' 10'  NV  10 min  10"     LB stretch pball rollout 10"x10 10" x10 10"x10 10"  x10 10"x10 10 NV  10"x10  10"x10     HS stretch 30"x3 30"x3 30"x3 30"x3 30"x3 30"x3 D/C  --       Piriformis stretch 30"x3 30"x3 30"x3 30"x3 30"x3 30"x3 NV  30"x3  30"x3     LTR 10"x10 10" x10 10"x10 10"  x10 10"x10 10"x10 NV  10"x10  10"x10     SKTC 30"x3 30"x3 NP 30"x3 30"x3 30"x3 D/C  --       PPT 3"x20 3"x20 3"x30 3"x30   3"x30 3"x30  3"x30  3"x30     PPT w/ march NP x10 ea 2 min, 3" hold 3" hold, 2 min   3" hold, 2 min 2 min, 3" hold  3" hold, 2 min  3"x30     PPT w/ heel slides NP x10 ea NP NP               PPT with iso hip abd/add NP 5"x15  Ea  NV 3" hold, 2 min   3" hold, 2 min NP  NP  3"x30  ea     Glute bridges NP NV 2x10 2x10   nv 2x10  2x10  2x10     Clam shells NP NV NV NV     3" x15 each  NP  np     Standing 3 way SLR     2# 2x10 each NV   Supine SLR 2x10 each  NV  np     Step ups     6" x20 each NV    NV  NV  np                                                                                                                                                                                                    Modalities                       MHP prn 10 min  10 min 10 min 10 min    10 min 10 min post  10 min post  np                                HEP: LB flex pball stretch, supine piriformis stretch, LTR, PPT, PPT with march, standing hip 3-way Yes

## 2019-06-20 NOTE — H&P ADULT - PROBLEM SELECTOR PLAN 9
IMPROVE VTE Individual Risk Assessment          RISK                                                          Points  [  ] Previous VTE                                                3  [  ] Thrombophilia                                             2  [  ] Lower limb paralysis                                   2        (unable to hold up >15 seconds)    [  ] Current Cancer                                             2         (within 6 months)  [  ] Immobilization > 24 hrs                              1  [  ] ICU/CCU stay > 24 hours                             1  [x  ] Age > 60                                                         1    IMPROVE VTE Score: 1  DVT ppx: heparin 5000 sq q8 IMPROVE VTE Individual Risk Assessment          RISK                                                          Points  [  ] Previous VTE                                                3  [  ] Thrombophilia                                             2  [  ] Lower limb paralysis                                   2        (unable to hold up >15 seconds)    [  ] Current Cancer                                             2         (within 6 months)  [  ] Immobilization > 24 hrs                              1  [  ] ICU/CCU stay > 24 hours                             1  [x  ] Age > 60                                                         1    IMPROVE VTE Score: 1  DVT ppx: SCD's, no pharm DVT ppx in event patient needs surgical intervention

## 2019-06-20 NOTE — H&P ADULT - ASSESSMENT
89M w/pmh of CAD s/p CABG (1995), b/l inguinal hernia repairs, enlarged prostate s/p TURP x2, HTN and HLD presenting with abdominal pain for 4 days. Admitted to [facility] with cecal volvulus, hyponatremia.

## 2019-06-20 NOTE — ED PROVIDER NOTE - CARE PLAN
Assessment and plan of treatment:	89yo M h/o cad, cabg, htn p/w lack of appetite w/ nausea x 5 days. denies f/c/v/d. pt reports some constipation. Principal Discharge DX:	Cecal volvulus  Assessment and plan of treatment:	89yo M h/o cad, cabg, htn p/w lack of appetite w/ nausea x 5 days. denies f/c/v/d. pt reports some constipation.  Secondary Diagnosis:	Pneumonia of left lung due to infectious organism, unspecified part of lung  Secondary Diagnosis:	Hyponatremia

## 2019-06-20 NOTE — H&P ADULT - PROBLEM SELECTOR PLAN 2
Unknown if chronic. Current Na 129  - Pt is not confused  - Continue normal saline IVF @ 80 cc/hr for 12 hours. Readdress in AM as patient has ascites and trace pleural effusions on CT Unknown if chronic. Current Na 129  - Pt is asymptomatic, not confused  - Continue normal saline IVF @ 80 cc/hr for 12 hours. Readdress in AM as patient has ascites and trace pleural effusions on CT Unknown if chronic. Current Na 129  - Pt is asymptomatic, not confused  - Continue normal saline IVF @ 80 cc/hr for 12 hours. Readdress in AM

## 2019-06-20 NOTE — H&P ADULT - NSHPPHYSICALEXAM_GEN_ALL_CORE
Physical Exam:  General: Well developed, well nourished, NAD  HEENT: NCAT, PERRLA, EOMI bl, moist mucous membranes   Neck: Supple, nontender, no mass  Neurology: A&Ox3, nonfocal  Respiratory: CTA B/L, No W/R/R  CV: tachycardic, +S1/S2, no murmurs, rubs or gallops  Abdominal: mildly distended, Soft, NT, +BSx4, no guarding, no rebound  Extremities: No C/C/E, + peripheral pulses  MSK: Normal ROM, no joint erythema or warmth, no joint swelling   Skin: warm, dry, normal color Physical Exam:  General: Well developed, well nourished, NAD  HEENT: NCAT, PERRLA, EOMI bl, moist mucous membranes   Neck: Supple, nontender, no mass  Neurology: A&Ox3, nonfocal  Respiratory: CTA B/L, No W/R/R  CV: tachycardic, +S1/S2, no murmurs, rubs or gallops  Abdominal: non-distended, Soft, NT, +BSx4, no guarding, no rebound  Extremities: No C/C/E, + peripheral pulses  MSK: Normal ROM, no joint erythema or warmth, no joint swelling   Skin: warm, dry, normal color

## 2019-06-21 ENCOUNTER — RESULT REVIEW (OUTPATIENT)
Age: 84
End: 2019-06-21

## 2019-06-21 LAB
ALBUMIN SERPL ELPH-MCNC: 2.7 G/DL — LOW (ref 3.3–5)
ALP SERPL-CCNC: 54 U/L — SIGNIFICANT CHANGE UP (ref 40–120)
ALT FLD-CCNC: 50 U/L — SIGNIFICANT CHANGE UP (ref 12–78)
ANION GAP SERPL CALC-SCNC: 9 MMOL/L — SIGNIFICANT CHANGE UP (ref 5–17)
AST SERPL-CCNC: 45 U/L — HIGH (ref 15–37)
BASOPHILS # BLD AUTO: 0 K/UL — SIGNIFICANT CHANGE UP (ref 0–0.2)
BASOPHILS NFR BLD AUTO: 0 % — SIGNIFICANT CHANGE UP (ref 0–2)
BILIRUB SERPL-MCNC: 0.5 MG/DL — SIGNIFICANT CHANGE UP (ref 0.2–1.2)
BUN SERPL-MCNC: 28 MG/DL — HIGH (ref 7–23)
CALCIUM SERPL-MCNC: 8.4 MG/DL — LOW (ref 8.5–10.1)
CHLORIDE SERPL-SCNC: 100 MMOL/L — SIGNIFICANT CHANGE UP (ref 96–108)
CO2 SERPL-SCNC: 27 MMOL/L — SIGNIFICANT CHANGE UP (ref 22–31)
CREAT SERPL-MCNC: 1 MG/DL — SIGNIFICANT CHANGE UP (ref 0.5–1.3)
EOSINOPHIL # BLD AUTO: 0.01 K/UL — SIGNIFICANT CHANGE UP (ref 0–0.5)
EOSINOPHIL NFR BLD AUTO: 0.1 % — SIGNIFICANT CHANGE UP (ref 0–6)
GLUCOSE SERPL-MCNC: 98 MG/DL — SIGNIFICANT CHANGE UP (ref 70–99)
HCT VFR BLD CALC: 32.2 % — LOW (ref 39–50)
HGB BLD-MCNC: 11.2 G/DL — LOW (ref 13–17)
IMM GRANULOCYTES NFR BLD AUTO: 0.3 % — SIGNIFICANT CHANGE UP (ref 0–1.5)
LYMPHOCYTES # BLD AUTO: 0.92 K/UL — LOW (ref 1–3.3)
LYMPHOCYTES # BLD AUTO: 13.3 % — SIGNIFICANT CHANGE UP (ref 13–44)
MCHC RBC-ENTMCNC: 32.8 PG — SIGNIFICANT CHANGE UP (ref 27–34)
MCHC RBC-ENTMCNC: 34.8 GM/DL — SIGNIFICANT CHANGE UP (ref 32–36)
MCV RBC AUTO: 94.4 FL — SIGNIFICANT CHANGE UP (ref 80–100)
MONOCYTES # BLD AUTO: 0.77 K/UL — SIGNIFICANT CHANGE UP (ref 0–0.9)
MONOCYTES NFR BLD AUTO: 11.2 % — SIGNIFICANT CHANGE UP (ref 2–14)
NEUTROPHILS # BLD AUTO: 5.18 K/UL — SIGNIFICANT CHANGE UP (ref 1.8–7.4)
NEUTROPHILS NFR BLD AUTO: 75.1 % — SIGNIFICANT CHANGE UP (ref 43–77)
NRBC # BLD: 0 /100 WBCS — SIGNIFICANT CHANGE UP (ref 0–0)
PLATELET # BLD AUTO: 230 K/UL — SIGNIFICANT CHANGE UP (ref 150–400)
POTASSIUM SERPL-MCNC: 3.9 MMOL/L — SIGNIFICANT CHANGE UP (ref 3.5–5.3)
POTASSIUM SERPL-SCNC: 3.9 MMOL/L — SIGNIFICANT CHANGE UP (ref 3.5–5.3)
PROT SERPL-MCNC: 6.5 G/DL — SIGNIFICANT CHANGE UP (ref 6–8.3)
RBC # BLD: 3.41 M/UL — LOW (ref 4.2–5.8)
RBC # FLD: 14.1 % — SIGNIFICANT CHANGE UP (ref 10.3–14.5)
SODIUM SERPL-SCNC: 136 MMOL/L — SIGNIFICANT CHANGE UP (ref 135–145)
WBC # BLD: 6.9 K/UL — SIGNIFICANT CHANGE UP (ref 3.8–10.5)
WBC # FLD AUTO: 6.9 K/UL — SIGNIFICANT CHANGE UP (ref 3.8–10.5)

## 2019-06-21 PROCEDURE — 71046 X-RAY EXAM CHEST 2 VIEWS: CPT | Mod: 26

## 2019-06-21 PROCEDURE — 44160 REMOVAL OF COLON: CPT

## 2019-06-21 PROCEDURE — 88307 TISSUE EXAM BY PATHOLOGIST: CPT | Mod: 26

## 2019-06-21 PROCEDURE — 99223 1ST HOSP IP/OBS HIGH 75: CPT | Mod: 57

## 2019-06-21 PROCEDURE — 99222 1ST HOSP IP/OBS MODERATE 55: CPT

## 2019-06-21 PROCEDURE — 99232 SBSQ HOSP IP/OBS MODERATE 35: CPT

## 2019-06-21 PROCEDURE — 44160 REMOVAL OF COLON: CPT | Mod: AS

## 2019-06-21 PROCEDURE — 74019 RADEX ABDOMEN 2 VIEWS: CPT | Mod: 26

## 2019-06-21 RX ORDER — METOPROLOL TARTRATE 50 MG
50 TABLET ORAL DAILY
Refills: 0 | Status: DISCONTINUED | OUTPATIENT
Start: 2019-06-21 | End: 2019-06-30

## 2019-06-21 RX ORDER — SODIUM CHLORIDE 9 MG/ML
1000 INJECTION, SOLUTION INTRAVENOUS
Refills: 0 | Status: DISCONTINUED | OUTPATIENT
Start: 2019-06-21 | End: 2019-06-21

## 2019-06-21 RX ORDER — NIFEDIPINE 30 MG
60 TABLET, EXTENDED RELEASE 24 HR ORAL DAILY
Refills: 0 | Status: DISCONTINUED | OUTPATIENT
Start: 2019-06-21 | End: 2019-06-30

## 2019-06-21 RX ORDER — PANTOPRAZOLE SODIUM 20 MG/1
40 TABLET, DELAYED RELEASE ORAL DAILY
Refills: 0 | Status: DISCONTINUED | OUTPATIENT
Start: 2019-06-22 | End: 2019-06-30

## 2019-06-21 RX ORDER — OXYCODONE HYDROCHLORIDE 5 MG/1
5 TABLET ORAL ONCE
Refills: 0 | Status: DISCONTINUED | OUTPATIENT
Start: 2019-06-21 | End: 2019-06-21

## 2019-06-21 RX ORDER — ACETAMINOPHEN 500 MG
650 TABLET ORAL EVERY 6 HOURS
Refills: 0 | Status: DISCONTINUED | OUTPATIENT
Start: 2019-06-21 | End: 2019-06-30

## 2019-06-21 RX ORDER — HYDROMORPHONE HYDROCHLORIDE 2 MG/ML
0.5 INJECTION INTRAMUSCULAR; INTRAVENOUS; SUBCUTANEOUS
Refills: 0 | Status: DISCONTINUED | OUTPATIENT
Start: 2019-06-21 | End: 2019-06-27

## 2019-06-21 RX ORDER — CEFOTETAN DISODIUM 1 G
2 VIAL (EA) INJECTION EVERY 12 HOURS
Refills: 0 | Status: DISCONTINUED | OUTPATIENT
Start: 2019-06-21 | End: 2019-06-27

## 2019-06-21 RX ORDER — FINASTERIDE 5 MG/1
5 TABLET, FILM COATED ORAL DAILY
Refills: 0 | Status: DISCONTINUED | OUTPATIENT
Start: 2019-06-21 | End: 2019-06-30

## 2019-06-21 RX ORDER — OXYCODONE HYDROCHLORIDE 5 MG/1
5 TABLET ORAL EVERY 4 HOURS
Refills: 0 | Status: DISCONTINUED | OUTPATIENT
Start: 2019-06-21 | End: 2019-06-27

## 2019-06-21 RX ORDER — HYDROMORPHONE HYDROCHLORIDE 2 MG/ML
0.5 INJECTION INTRAMUSCULAR; INTRAVENOUS; SUBCUTANEOUS
Refills: 0 | Status: DISCONTINUED | OUTPATIENT
Start: 2019-06-21 | End: 2019-06-21

## 2019-06-21 RX ORDER — ATORVASTATIN CALCIUM 80 MG/1
80 TABLET, FILM COATED ORAL AT BEDTIME
Refills: 0 | Status: DISCONTINUED | OUTPATIENT
Start: 2019-06-21 | End: 2019-06-30

## 2019-06-21 RX ORDER — ENOXAPARIN SODIUM 100 MG/ML
40 INJECTION SUBCUTANEOUS DAILY
Refills: 0 | Status: DISCONTINUED | OUTPATIENT
Start: 2019-06-22 | End: 2019-06-30

## 2019-06-21 RX ORDER — TAMSULOSIN HYDROCHLORIDE 0.4 MG/1
0.4 CAPSULE ORAL AT BEDTIME
Refills: 0 | Status: DISCONTINUED | OUTPATIENT
Start: 2019-06-21 | End: 2019-06-26

## 2019-06-21 RX ORDER — CEFAZOLIN SODIUM 1 G
2000 VIAL (EA) INJECTION ONCE
Refills: 0 | Status: COMPLETED | OUTPATIENT
Start: 2019-06-21 | End: 2019-06-21

## 2019-06-21 RX ORDER — ONDANSETRON 8 MG/1
4 TABLET, FILM COATED ORAL ONCE
Refills: 0 | Status: DISCONTINUED | OUTPATIENT
Start: 2019-06-21 | End: 2019-06-21

## 2019-06-21 RX ORDER — SODIUM CHLORIDE 9 MG/ML
1000 INJECTION, SOLUTION INTRAVENOUS
Refills: 0 | Status: DISCONTINUED | OUTPATIENT
Start: 2019-06-21 | End: 2019-06-25

## 2019-06-21 RX ORDER — METRONIDAZOLE 500 MG
500 TABLET ORAL ONCE
Refills: 0 | Status: COMPLETED | OUTPATIENT
Start: 2019-06-21 | End: 2019-06-21

## 2019-06-21 RX ORDER — DOCUSATE SODIUM 100 MG
100 CAPSULE ORAL THREE TIMES A DAY
Refills: 0 | Status: DISCONTINUED | OUTPATIENT
Start: 2019-06-21 | End: 2019-06-30

## 2019-06-21 RX ADMIN — SODIUM CHLORIDE 100 MILLILITER(S): 9 INJECTION, SOLUTION INTRAVENOUS at 18:52

## 2019-06-21 RX ADMIN — Medication 50 MILLIGRAM(S): at 22:29

## 2019-06-21 RX ADMIN — Medication 60 MILLIGRAM(S): at 06:18

## 2019-06-21 RX ADMIN — OXYCODONE HYDROCHLORIDE 5 MILLIGRAM(S): 5 TABLET ORAL at 22:45

## 2019-06-21 RX ADMIN — OXYCODONE HYDROCHLORIDE 5 MILLIGRAM(S): 5 TABLET ORAL at 23:40

## 2019-06-21 RX ADMIN — Medication 60 MILLIGRAM(S): at 22:29

## 2019-06-21 RX ADMIN — FINASTERIDE 5 MILLIGRAM(S): 5 TABLET, FILM COATED ORAL at 11:06

## 2019-06-21 RX ADMIN — ATORVASTATIN CALCIUM 80 MILLIGRAM(S): 80 TABLET, FILM COATED ORAL at 22:29

## 2019-06-21 RX ADMIN — Medication 100 GRAM(S): at 22:30

## 2019-06-21 RX ADMIN — TAMSULOSIN HYDROCHLORIDE 0.4 MILLIGRAM(S): 0.4 CAPSULE ORAL at 22:30

## 2019-06-21 RX ADMIN — Medication 100 MILLIGRAM(S): at 22:29

## 2019-06-21 RX ADMIN — SODIUM CHLORIDE 125 MILLILITER(S): 9 INJECTION, SOLUTION INTRAVENOUS at 22:08

## 2019-06-21 RX ADMIN — Medication 50 MILLIGRAM(S): at 06:18

## 2019-06-21 RX ADMIN — FINASTERIDE 5 MILLIGRAM(S): 5 TABLET, FILM COATED ORAL at 22:29

## 2019-06-21 NOTE — PROGRESS NOTE ADULT - SUBJECTIVE AND OBJECTIVE BOX
Patient is a 89y old  Male who presents with a chief complaint of abdominal pain (2019 20:10)        HPI:  89M w/pmh of CAD s/p CABG (), b/l inguinal hernia repairs, enlarged prostate s/p TURP x2, HTN and HLD presenting with abdominal pain for 4 days. States that after dinner on , he had a sharp, periumbilical pain with associated nausea and decreased appetite. He thought that he had eaten something that upset his stomach. He takes Tylenol at night for prostate discomfort. Over the past few days, his abdominal pain has improved to a dull ache, 3/10 in severity, nonradiating located around umbilicus. He had constipation up until this morning when he had a large bm after a fleet enema. He is able to pass gas.  Denies fevers/chills, chest pain, palpitations, sob, dysuria, edema or weakness. Patient's last colonoscopy was 7 years ago, wnl.     In the ED, patient's vitals were: T98.5F, , /72, RR 14 and SpO2 95% on room air. Significant labs include: H/H 12.1/35.5, Na 129, trops neg x1, pro-. UA wnl. Pt received Azithromycin and Ceftriaxone.   CT A/P: he cecum is dilated and folded on itself in the right upper quadrant, changed in location from prior CT. There is wall thickening involving the terminal ileum, cecum and proximal ascending colon. Appendix is distended and air-filled measuring up to 1.3 cm at the tip.   CXR: No change heart mediastinum. Asymmetric elevation right hemidiaphragm. There is left basilar retrocardiac atelectasis/infiltrate. Correlate for active infection. Small left pleural effusion. Distended bowel visualized upper abdomen.  EKG: sinus tachycardia, , no ST elevation/depression (2019 20:10)      SUBJECTIVE & OBJECTIVE: Pt seen and examined at bedside, abdominal pain     PHYSICAL EXAM:  T(C): 36.8 (19 @ 04:53), Max: 37.4 (19 @ 23:04)  HR: 102 (19 @ 04:53) (72 - 118)  BP: 125/58 (19 @ 04:53) (117/70 - 132/71)  RR: 17 (19 @ 04:53) (14 - 17)  SpO2: 91% (19 @ 04:53) (91% - 95%)  Wt(kg): -- Height (cm): 170.18 ( @ 14:18)  Weight (kg): 68.5 ( @ 14:18)  BMI (kg/m2): 23.7 ( @ 14:18)  BSA (m2): 1.79 ( @ 14:18)  GENERAL: NAD, well-groomed, well-developed  NECK: Supple, No JVD  NERVOUS SYSTEM:  Alert & Oriented X3,   CHEST/LUNG:decrease air entry at the abses   HEART: Regular rate and rhythm; No murmurs, rubs, or gallops  ABDOMEN: Soft, Nontender, Nondistended; Bowel sounds present  EXTREMITIES:  2+ Peripheral Pulses, No clubbing, cyanosis, or edema        MEDICATIONS  (STANDING):  atorvastatin 80 milliGRAM(s) Oral at bedtime  finasteride 5 milliGRAM(s) Oral daily  metoprolol succinate ER 50 milliGRAM(s) Oral daily  NIFEdipine XL 60 milliGRAM(s) Oral daily  sodium chloride 0.9%. 1000 milliLiter(s) (80 mL/Hr) IV Continuous <Continuous>  tamsulosin 0.4 milliGRAM(s) Oral at bedtime    MEDICATIONS  (PRN):  acetaminophen   Tablet .. 650 milliGRAM(s) Oral every 6 hours PRN Temp greater or equal to 38C (100.4F), Mild Pain (1 - 3)  morphine  - Injectable 1 milliGRAM(s) IV Push every 6 hours PRN Moderate Pain (4 - 6)  ondansetron Injectable 4 milliGRAM(s) IV Push every 6 hours PRN Nausea and/or Vomiting      LABS:                        11.2   6.90  )-----------( 230      ( 2019 07:54 )             32.2     -    136  |  100  |  28<H>  ----------------------------<  98  3.9   |  27  |  1.00    Ca    8.4<L>      2019 07:54    TPro  6.5  /  Alb  2.7<L>  /  TBili  0.5  /  DBili  x   /  AST  45<H>  /  ALT  50  /  AlkPhos  54  06-21    PT/INR - ( 2019 15:34 )   PT: 11.5 sec;   INR: 1.01 ratio         PTT - ( 2019 15:34 )  PTT:25.9 sec  Urinalysis Basic - ( 2019 18:43 )    Color: Yellow / Appearance: Clear / S.005 / pH: x  Gluc: x / Ketone: Negative  / Bili: Negative / Urobili: Negative   Blood: x / Protein: 25 mg/dL / Nitrite: Negative   Leuk Esterase: Negative / RBC: 0-2 /HPF / WBC x   Sq Epi: x / Non Sq Epi: Occasional / Bacteria: Occasional        CAPILLARY BLOOD GLUCOSE          CAPILLARY BLOOD GLUCOSE        CAPILLARY BLOOD GLUCOSE          CARDIAC MARKERS ( 2019 15:34 )  <.015 ng/mL / x     / x     / x     / 5.0 ng/mL        RECENT CULTURES:      RADIOLOGY & ADDITIONAL TESTS:                        DVT/GI ppx  Discussed with pt @ bedside

## 2019-06-21 NOTE — BRIEF OPERATIVE NOTE - NSICDXBRIEFPOSTOP_GEN_ALL_CORE_FT
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

## 2019-06-21 NOTE — PROGRESS NOTE ADULT - SUBJECTIVE AND OBJECTIVE BOX
HOSPITAL DAY: 1    SUBJECTIVE:  89 y/o M examined at bedside with no acute overnight events. Pt offers no complaints at this time in NAD. Pt currently NPO admits to flatus however denies bowel movement.  Patient denies any fevers, chills, chest pain, shortness of breath, abdominal pain, nausea, vomiting or diarrhea.    Vital Signs Last 24 Hrs  T(C): 36.8 (2019 04:53), Max: 37.4 (2019 23:04)  T(F): 98.2 (2019 04:53), Max: 99.3 (2019 23:04)  HR: 102 (2019 04:53) (72 - 118)  BP: 125/58 (2019 04:53) (117/70 - 132/71)  BP(mean): --  RR: 17 (2019 04:53) (14 - 17)  SpO2: 91% (2019 04:53) (91% - 95%)    PHYSICAL EXAM:  GENERAL: No acute distress  CHEST/LUNG: CTABL, no ronchi, rales or wheezing  HEART: RRR, no MRG  ABDOMEN: Soft, minimal TTP of rlq, nondistended, normoactive bowel sounds throughout all 4 quadrants  NEUROLOGY: A&O x 3, no focal deficits    I&O's Summary    I&O's Detail    MEDICATIONS  (STANDING):  atorvastatin 80 milliGRAM(s) Oral at bedtime  finasteride 5 milliGRAM(s) Oral daily  metoprolol succinate ER 50 milliGRAM(s) Oral daily  NIFEdipine XL 60 milliGRAM(s) Oral daily  sodium chloride 0.9%. 1000 milliLiter(s) (80 mL/Hr) IV Continuous <Continuous>  tamsulosin 0.4 milliGRAM(s) Oral at bedtime    MEDICATIONS  (PRN):  acetaminophen   Tablet .. 650 milliGRAM(s) Oral every 6 hours PRN Temp greater or equal to 38C (100.4F), Mild Pain (1 - 3)  morphine  - Injectable 1 milliGRAM(s) IV Push every 6 hours PRN Moderate Pain (4 - 6)  ondansetron Injectable 4 milliGRAM(s) IV Push every 6 hours PRN Nausea and/or Vomiting    LABS:                        11.2   6.90  )-----------( 230      ( 2019 07:54 )             32.2     06    136  |  100  |  28<H>  ----------------------------<  98  3.9   |  27  |  1.00    Ca    8.4<L>      2019 07:54    TPro  6.5  /  Alb  2.7<L>  /  TBili  0.5  /  DBili  x   /  AST  45<H>  /  ALT  50  /  AlkPhos  54  21    PT/INR - ( 2019 15:34 )   PT: 11.5 sec;   INR: 1.01 ratio         PTT - ( 2019 15:34 )  PTT:25.9 sec  Urinalysis Basic - ( 2019 18:43 )    Color: Yellow / Appearance: Clear / S.005 / pH: x  Gluc: x / Ketone: Negative  / Bili: Negative / Urobili: Negative   Blood: x / Protein: 25 mg/dL / Nitrite: Negative   Leuk Esterase: Negative / RBC: 0-2 /HPF / WBC x   Sq Epi: x / Non Sq Epi: Occasional / Bacteria: Occasional      ASSESSMENT  89 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 Tse    Surgical Team Contact Information  Spectralink: Ext: [phone] or [phone]  Pager: [phone]

## 2019-06-21 NOTE — BRIEF OPERATIVE NOTE - NSICDXBRIEFPREOP_GEN_ALL_CORE_FT
PRE-OP DIAGNOSIS:  Cecal volvulus 21-Jun-2019 19:36:20  Renato Tse  Large bowel obstruction 21-Jun-2019 19:35:36  Renato Tse

## 2019-06-21 NOTE — PROGRESS NOTE ADULT - ASSESSMENT
89 year old male  PMH + Hypertension, Hyperlipidemia, BPH  PSH + CABG and open repair of bilateral inguinal hernias and TURP times 2  Admitted last pm due to several days of abdominal discomfort/ pain, bloating of abdomen and decreased appetite/ anorexia  Patient reports no BM for a "couple of days" until prompted by suppository last night  No flatus since admission  On examination vitals non suggestive  Patient appears well and in no acute distress, though still reporting discomfort  Abdomen soft, but distended with tenderness to DEEP palpation of RUQ  CBC WNL, chemistry improved  CT report reviewed, images personally gone over, report and images seen with radiology:  Cecum markedly dilated, thickening of cecum and terminal ileum, dilation of appendix consistent with obstruction, small ascites adjacent to cecum/ "bascule"    In summary, optimized by Medical/ Hospitalist service and "cleared" by Cardiology  Now with large bowel obstruction secondary to cecal bascule v. volvulus  Risks, benefits, nature of operative intervention discussed in detail and at length with patient and wife  Including but not limited to, general anesthesia with intubation, post-operative intubation, Weaver catheter and NG/ OG insertion, laparotomy, planned right hemicolectomy, possible ileostomy, roshan-operative cardiopulmonary complications including death, and local or surgical complications such as infection/ bleeding/ hernia/ adhesions  Patient appears well enough for surgery  However, given signs of ongoing obstruction and possible edema or low grade ischemia, I would not advocate for further observation with the expectation that timely intervention would allow for anastomosis and well-tolerated procedure  Patient and wife show excellent insight, ask appropriate questions and are eager to proceed TODAY.  FOR OR.

## 2019-06-21 NOTE — PROGRESS NOTE ADULT - ASSESSMENT
89M w/pmh of CAD s/p CABG (1995), b/l inguinal hernia repairs, enlarged prostate s/p TURP x2, HTN and HLD presenting with abdominal pain for 4 days. Admitted to [facility] with cecal volvulus, hyponatremia.

## 2019-06-21 NOTE — BRIEF OPERATIVE NOTE - NSICDXBRIEFPROCEDURE_GEN_ALL_CORE_FT
PROCEDURES:  Right hemicolectomy with removal of terminal ileum and ileocolostomy 21-Jun-2019 19:37:26  Renato Tse J

## 2019-06-22 LAB
ANION GAP SERPL CALC-SCNC: 11 MMOL/L — SIGNIFICANT CHANGE UP (ref 5–17)
BASOPHILS # BLD AUTO: 0.02 K/UL — SIGNIFICANT CHANGE UP (ref 0–0.2)
BASOPHILS NFR BLD AUTO: 0.2 % — SIGNIFICANT CHANGE UP (ref 0–2)
BUN SERPL-MCNC: 27 MG/DL — HIGH (ref 7–23)
CALCIUM SERPL-MCNC: 8.1 MG/DL — LOW (ref 8.5–10.1)
CHLORIDE SERPL-SCNC: 100 MMOL/L — SIGNIFICANT CHANGE UP (ref 96–108)
CO2 SERPL-SCNC: 25 MMOL/L — SIGNIFICANT CHANGE UP (ref 22–31)
CREAT SERPL-MCNC: 0.99 MG/DL — SIGNIFICANT CHANGE UP (ref 0.5–1.3)
EOSINOPHIL # BLD AUTO: 0 K/UL — SIGNIFICANT CHANGE UP (ref 0–0.5)
EOSINOPHIL NFR BLD AUTO: 0 % — SIGNIFICANT CHANGE UP (ref 0–6)
GLUCOSE SERPL-MCNC: 105 MG/DL — HIGH (ref 70–99)
HCT VFR BLD CALC: 36.6 % — LOW (ref 39–50)
HGB BLD-MCNC: 12.5 G/DL — LOW (ref 13–17)
IMM GRANULOCYTES NFR BLD AUTO: 0.4 % — SIGNIFICANT CHANGE UP (ref 0–1.5)
LYMPHOCYTES # BLD AUTO: 0.65 K/UL — LOW (ref 1–3.3)
LYMPHOCYTES # BLD AUTO: 6.7 % — LOW (ref 13–44)
MAGNESIUM SERPL-MCNC: 2.1 MG/DL — SIGNIFICANT CHANGE UP (ref 1.6–2.6)
MCHC RBC-ENTMCNC: 32.4 PG — SIGNIFICANT CHANGE UP (ref 27–34)
MCHC RBC-ENTMCNC: 34.2 GM/DL — SIGNIFICANT CHANGE UP (ref 32–36)
MCV RBC AUTO: 94.8 FL — SIGNIFICANT CHANGE UP (ref 80–100)
MONOCYTES # BLD AUTO: 0.81 K/UL — SIGNIFICANT CHANGE UP (ref 0–0.9)
MONOCYTES NFR BLD AUTO: 8.3 % — SIGNIFICANT CHANGE UP (ref 2–14)
NEUTROPHILS # BLD AUTO: 8.24 K/UL — HIGH (ref 1.8–7.4)
NEUTROPHILS NFR BLD AUTO: 84.4 % — HIGH (ref 43–77)
NRBC # BLD: 0 /100 WBCS — SIGNIFICANT CHANGE UP (ref 0–0)
PLATELET # BLD AUTO: 293 K/UL — SIGNIFICANT CHANGE UP (ref 150–400)
POTASSIUM SERPL-MCNC: 4.3 MMOL/L — SIGNIFICANT CHANGE UP (ref 3.5–5.3)
POTASSIUM SERPL-SCNC: 4.3 MMOL/L — SIGNIFICANT CHANGE UP (ref 3.5–5.3)
RBC # BLD: 3.86 M/UL — LOW (ref 4.2–5.8)
RBC # FLD: 14 % — SIGNIFICANT CHANGE UP (ref 10.3–14.5)
SODIUM SERPL-SCNC: 136 MMOL/L — SIGNIFICANT CHANGE UP (ref 135–145)
WBC # BLD: 9.76 K/UL — SIGNIFICANT CHANGE UP (ref 3.8–10.5)
WBC # FLD AUTO: 9.76 K/UL — SIGNIFICANT CHANGE UP (ref 3.8–10.5)

## 2019-06-22 PROCEDURE — 99024 POSTOP FOLLOW-UP VISIT: CPT

## 2019-06-22 PROCEDURE — 99232 SBSQ HOSP IP/OBS MODERATE 35: CPT

## 2019-06-22 RX ORDER — ACETAMINOPHEN 500 MG
1000 TABLET ORAL ONCE
Refills: 0 | Status: COMPLETED | OUTPATIENT
Start: 2019-06-22 | End: 2019-06-22

## 2019-06-22 RX ADMIN — ATORVASTATIN CALCIUM 80 MILLIGRAM(S): 80 TABLET, FILM COATED ORAL at 21:15

## 2019-06-22 RX ADMIN — HYDROMORPHONE HYDROCHLORIDE 0.5 MILLIGRAM(S): 2 INJECTION INTRAMUSCULAR; INTRAVENOUS; SUBCUTANEOUS at 17:01

## 2019-06-22 RX ADMIN — HYDROMORPHONE HYDROCHLORIDE 0.5 MILLIGRAM(S): 2 INJECTION INTRAMUSCULAR; INTRAVENOUS; SUBCUTANEOUS at 03:18

## 2019-06-22 RX ADMIN — Medication 100 GRAM(S): at 10:36

## 2019-06-22 RX ADMIN — Medication 100 MILLIGRAM(S): at 21:15

## 2019-06-22 RX ADMIN — Medication 400 MILLIGRAM(S): at 22:53

## 2019-06-22 RX ADMIN — TAMSULOSIN HYDROCHLORIDE 0.4 MILLIGRAM(S): 0.4 CAPSULE ORAL at 21:15

## 2019-06-22 RX ADMIN — ENOXAPARIN SODIUM 40 MILLIGRAM(S): 100 INJECTION SUBCUTANEOUS at 11:25

## 2019-06-22 RX ADMIN — Medication 100 MILLIGRAM(S): at 13:38

## 2019-06-22 RX ADMIN — Medication 60 MILLIGRAM(S): at 05:50

## 2019-06-22 RX ADMIN — HYDROMORPHONE HYDROCHLORIDE 0.5 MILLIGRAM(S): 2 INJECTION INTRAMUSCULAR; INTRAVENOUS; SUBCUTANEOUS at 20:09

## 2019-06-22 RX ADMIN — Medication 50 MILLIGRAM(S): at 10:36

## 2019-06-22 RX ADMIN — HYDROMORPHONE HYDROCHLORIDE 0.5 MILLIGRAM(S): 2 INJECTION INTRAMUSCULAR; INTRAVENOUS; SUBCUTANEOUS at 20:19

## 2019-06-22 RX ADMIN — FINASTERIDE 5 MILLIGRAM(S): 5 TABLET, FILM COATED ORAL at 11:26

## 2019-06-22 RX ADMIN — HYDROMORPHONE HYDROCHLORIDE 0.5 MILLIGRAM(S): 2 INJECTION INTRAMUSCULAR; INTRAVENOUS; SUBCUTANEOUS at 03:40

## 2019-06-22 RX ADMIN — HYDROMORPHONE HYDROCHLORIDE 0.5 MILLIGRAM(S): 2 INJECTION INTRAMUSCULAR; INTRAVENOUS; SUBCUTANEOUS at 13:46

## 2019-06-22 RX ADMIN — HYDROMORPHONE HYDROCHLORIDE 0.5 MILLIGRAM(S): 2 INJECTION INTRAMUSCULAR; INTRAVENOUS; SUBCUTANEOUS at 06:30

## 2019-06-22 RX ADMIN — HYDROMORPHONE HYDROCHLORIDE 0.5 MILLIGRAM(S): 2 INJECTION INTRAMUSCULAR; INTRAVENOUS; SUBCUTANEOUS at 13:59

## 2019-06-22 RX ADMIN — HYDROMORPHONE HYDROCHLORIDE 0.5 MILLIGRAM(S): 2 INJECTION INTRAMUSCULAR; INTRAVENOUS; SUBCUTANEOUS at 09:45

## 2019-06-22 RX ADMIN — PANTOPRAZOLE SODIUM 40 MILLIGRAM(S): 20 TABLET, DELAYED RELEASE ORAL at 11:30

## 2019-06-22 RX ADMIN — Medication 100 MILLIGRAM(S): at 05:49

## 2019-06-22 RX ADMIN — Medication 1000 MILLIGRAM(S): at 23:53

## 2019-06-22 RX ADMIN — HYDROMORPHONE HYDROCHLORIDE 0.5 MILLIGRAM(S): 2 INJECTION INTRAMUSCULAR; INTRAVENOUS; SUBCUTANEOUS at 06:13

## 2019-06-22 RX ADMIN — Medication 100 GRAM(S): at 17:02

## 2019-06-22 RX ADMIN — HYDROMORPHONE HYDROCHLORIDE 0.5 MILLIGRAM(S): 2 INJECTION INTRAMUSCULAR; INTRAVENOUS; SUBCUTANEOUS at 09:24

## 2019-06-22 NOTE — PROGRESS NOTE ADULT - ASSESSMENT
89M w/pmh of CAD s/p CABG (1995), b/l inguinal hernia repairs, enlarged prostate s/p TURP x2, HTN and HLD presenting with abdominal pain for 4 days. Admitted to [facility] with cecal volvulus, was taken to the OR found ot have   Ischemic and obstructed right cecum/ colon., underRight hemicolectomy with removal of terminal, ileum and ileocolostomy tolerated proecdure well

## 2019-06-22 NOTE — CHART NOTE - NSCHARTNOTEFT_GEN_A_CORE
Called by RN as patient's BP elevated to 180/90 manually. Per chart review, BP has been trending up gradually throughout day. Patient seen and examined at bedside. Complaining of uncontrolled pain with current pain regimen, stating that his pain only decreases from 7/10 to 5/10 with Dilaudid. The nature of his abdominal pain is unchanged, similar to the pain that's he's been experiencing post-operatively. Otherwise, he feels well without additional complaints, denies chest pain, palpitations, SOB, cough, urinary symptoms, headaches, changes in vision, or any other symptoms at this time.    Vital Signs Last 24 Hrs  T(C): 37.1 (22 Jun 2019 20:32), Max: 37.2 (22 Jun 2019 18:20)  T(F): 98.7 (22 Jun 2019 20:32), Max: 98.9 (22 Jun 2019 18:20)  HR: 100 (22 Jun 2019 22:01) (95 - 120)  BP: 180/90 (22 Jun 2019 22:01) (144/67 - 180/90)  RR: 19 (22 Jun 2019 20:32) (17 - 19)  SpO2: 96% (22 Jun 2019 20:32) (93% - 96%)    Physical Exam:  General: Well developed, in no acute distress, though appears uncomfortable  HEENT: NCAT, moist mucous membranes   Neurology: A&Ox3, nonfocal   Respiratory: CTA B/L, No wheezing, rales, or rhonchi  CV: RRR, S1/S2 present, no murmurs  Abdominal: Soft, diffuse tenderness to palpation at incision sites, non-distended, normoactive bowel sounds, no peritoneal signs, no rebound tenderness  : Weaver in place, draining non-bloody urine, no suprapubic tenderness to palpation  Extremities: No LE edema bilaterally, peripheral pulses present  Skin: Abdominal dressings clean, dry, intact      89 yo M with PMH of CAD s/p CABG (1995), b/l inguinal hernia repairs, enlarged prostate s/p TURP x2, HTN, and HLD, presenting with abdominal pain for 4 days. Admitted to [facility] with cecal volvulus, POD 1 s/p Right hemicolectomy secondary to cecal bascule with signs of ischemic changes, now found to be persistently elevated blood pressure readings.    -Persistent elevated blood pressure readings likely due to suboptimal control of his abdominal pain.  -Patient currently receiving Dilaudid 0.5mg IV q3h prn severe pain, will continue for now.  -Will give Ofirmev 1000mg IV x1 STAT for breakthru pain.  -Will recheck BP in one hour; if remains elevated, will consider giving additional antihypertensive therapy, as he is not due for any antihypertensive medications until 06:00.  -Will continue to monitor, RN to call if any changes.

## 2019-06-22 NOTE — PROGRESS NOTE ADULT - SUBJECTIVE AND OBJECTIVE BOX
[facility] -- Nicole Mayer, Radha, Zachery, Elmer Prasad Savella  Office # [phone]      Follow Up:    CAD  Subjective/Observations:   No events overnight resting comfortably in bed.  No complaints of chest pain, dyspnea, or palpitations reported. No signs of orthopnea or PND.     REVIEW OF SYSTEMS: All other review of systems is negative unless indicated above    PAST MEDICAL & SURGICAL HISTORY:  CAD (coronary artery disease): s/p CABG, 1995  Anginal pain  HTN (hypertension)  Enlarged prostate  H/O inguinal hernia repair: bilateral  S/P TURP: x2      MEDICATIONS  (STANDING):  atorvastatin 80 milliGRAM(s) Oral at bedtime  cefoTEtan  IVPB 2 Gram(s) IV Intermittent every 12 hours  docusate sodium 100 milliGRAM(s) Oral three times a day  enoxaparin Injectable 40 milliGRAM(s) SubCutaneous daily  finasteride 5 milliGRAM(s) Oral daily  lactated ringers. 1000 milliLiter(s) (125 mL/Hr) IV Continuous <Continuous>  metoprolol succinate ER 50 milliGRAM(s) Oral daily  NIFEdipine XL 60 milliGRAM(s) Oral daily  pantoprazole  Injectable 40 milliGRAM(s) IV Push daily  tamsulosin 0.4 milliGRAM(s) Oral at bedtime    MEDICATIONS  (PRN):  acetaminophen   Tablet .. 650 milliGRAM(s) Oral every 6 hours PRN Temp greater or equal to 38.5C (101.3F), Mild Pain (1 - 3)  HYDROmorphone  Injectable 0.5 milliGRAM(s) IV Push every 3 hours PRN Severe Pain (7 - 10)  oxyCODONE    IR 5 milliGRAM(s) Oral every 4 hours PRN Moderate Pain (4 - 6)      Allergies    No Known Allergies    Intolerances        Vital Signs Last 24 Hrs  T(C): 36.8 (22 Jun 2019 05:41), Max: 37.2 (21 Jun 2019 14:45)  T(F): 98.2 (22 Jun 2019 05:41), Max: 99 (21 Jun 2019 14:45)  HR: 102 (22 Jun 2019 05:41) (73 - 105)  BP: 166/96 (22 Jun 2019 10:35) (109/67 - 166/96)  BP(mean): --  RR: 17 (22 Jun 2019 05:41) (14 - 18)  SpO2: 95% (22 Jun 2019 05:41) (92% - 99%)    I&O's Summary    21 Jun 2019 07:01  -  22 Jun 2019 07:00  --------------------------------------------------------  IN: 1100 mL / OUT: 500 mL / NET: 600 mL      Weight (kg): 68.5 (06-21 @ 14:24)    PHYSICAL EXAM:  TELE: SR/ST  Constitutional: NAD, awake and alert, well-developed  HEENT: Moist Mucous Membranes, Anicteric  Pulmonary: Non-labored, breath sounds are clear bilaterally, No wheezing, crackles or rhonchi  Cardiovascular: Regular, S1 and S2 nl, No murmurs, rubs, gallops or clicks  Gastrointestinal: Bowel Sounds present, soft, nontender.   Lymph: No lymphadenopathy. No peripheral edema.  Skin: No visible rashes or ulcers.  Psych:  Mood & affect appropriate    LABS: All Labs Reviewed:                        12.5   9.76  )-----------( 293      ( 22 Jun 2019 08:54 )             36.6                         11.2   6.90  )-----------( 230      ( 21 Jun 2019 07:54 )             32.2                         12.1   10.29 )-----------( 234      ( 20 Jun 2019 15:34 )             35.5     22 Jun 2019 08:54    136    |  100    |  27     ----------------------------<  105    4.3     |  25     |  0.99   21 Jun 2019 07:54    136    |  100    |  28     ----------------------------<  98     3.9     |  27     |  1.00   20 Jun 2019 15:34    129    |  94     |  37     ----------------------------<  107    4.2     |  27     |  1.10     Ca    8.1        22 Jun 2019 08:54  Ca    8.4        21 Jun 2019 07:54  Ca    8.8        20 Jun 2019 15:34  Mg     2.1       22 Jun 2019 08:54    TPro  6.5    /  Alb  2.7    /  TBili  0.5    /  DBili  x      /  AST  45     /  ALT  50     /  AlkPhos  54     21 Jun 2019 07:54  TPro  7.3    /  Alb  3.2    /  TBili  0.6    /  DBili  x      /  AST  48     /  ALT  52     /  AlkPhos  64     20 Jun 2019 15:34    PT/INR - ( 20 Jun 2019 15:34 )   PT: 11.5 sec;   INR: 1.01 ratio         PTT - ( 20 Jun 2019 15:34 )  PTT:25.9 sec  CARDIAC MARKERS ( 20 Jun 2019 15:34 )  <.015 ng/mL / x     / x     / x     / 5.0 ng/mL         ECG:  < from: 12 Lead ECG (06.20.19 @ 15:29) >  Ventricular Rate 106 BPM    Atrial Rate 106 BPM    P-R Interval 184 ms    QRS Duration 108 ms    Q-T Interval 342 ms    QTC Calculation(Bezet) 454 ms    P Axis 41 degrees    R Axis -58 degrees    T Axis 74 degrees    Diagnosis Line Sinus tachycardia  Left anterior fascicular block  Abnormal ECG  Confirmed by DEEPAK PRASAD ([id number]) on 6/21/2019 8:18:12 AM    < end of copied text >    Echo:    Radiology:  < from: Xray Chest 1 View- PORTABLE-Urgent (06.20.19 @ 15:22) >  EXAM:  XR CHEST PORTABLE URGENT 1V                            PROCEDURE DATE:  06/20/2019          INTERPRETATION:  Abdominal pain.    AP chest. Prior dated 2/26/2011.    Status post CABG.  Very low lung volumes. No change heart mediastinum. Asymmetric elevation   right hemidiaphragm. There is left basilar retrocardiac   atelectasis/infiltrate. Correlate for active infection. Small left   pleural effusion. Distended bowel visualized upper abdomen.    Impression: As above                LEORA MOORE M.D., ATTENDING RADIOLOGIST  This document has been electronically signed. Jun 20 2019  3:36PM                < end of copied text >           [facility]   Cardiology

## 2019-06-22 NOTE — PROGRESS NOTE ADULT - SUBJECTIVE AND OBJECTIVE BOX
Covering for Dr Tse:    Vital Signs Last 24 Hrs  T(C): 37.1 (22 Jun 2019 13:29), Max: 37.1 (22 Jun 2019 02:11)  T(F): 98.8 (22 Jun 2019 13:29), Max: 98.8 (22 Jun 2019 02:11)  HR: 95 (22 Jun 2019 13:29) (73 - 104)  BP: 175/82 (22 Jun 2019 13:29) (125/67 - 175/82)  BP(mean): --  RR: 17 (22 Jun 2019 13:29) (14 - 18)  SpO2: 96% (22 Jun 2019 13:29) (92% - 99%)    06-21 @ 07:01  -  06-22 @ 07:00  --------------------------------------------------------  IN: 1100 mL / OUT: 500 mL / NET: 600 mL    06-22 @ 07:01  -  06-22 @ 15:25  --------------------------------------------------------  IN: 0 mL / OUT: 700 mL / NET: -700 mL                              12.5   9.76  )-----------( 293      ( 22 Jun 2019 08:54 )             36.6                         11.2   6.90  )-----------( 230      ( 21 Jun 2019 07:54 )             32.2                         12.1   10.29 )-----------( 234      ( 20 Jun 2019 15:34 )             35.5       06-22    136  |  100  |  27<H>  ----------------------------<  105<H>  4.3   |  25  |  0.99    Ca    8.1<L>      22 Jun 2019 08:54  Mg     2.1     06-22    TPro  6.5  /  Alb  2.7<L>  /  TBili  0.5  /  DBili  x   /  AST  45<H>  /  ALT  50  /  AlkPhos  54  06-21      Physical Exam:  Awake alert and oriented x3 in no acute distress. NCAT, PERRLA, EOMI  Lungs clear bilaterally without wheezes rhonchi or rales.  Regular rate and rhythm  Abdomen soft, Incisional tenderness as expected, nondistended, positive bowel sounds in all 4 quadrants. Surgical incision remained well approximated without erythema, induration or fluctuance. Dressings remained clean dry and intact  Extremities without edema or tenderness.

## 2019-06-22 NOTE — PROGRESS NOTE ADULT - SUBJECTIVE AND OBJECTIVE BOX
Patient is a 89y old  Male who presents with a chief complaint of abdominal pain (2019 10:53)        HPI:  89M w/pmh of CAD s/p CABG (), b/l inguinal hernia repairs, enlarged prostate s/p TURP x2, HTN and HLD presenting with abdominal pain for 4 days. States that after dinner on , he had a sharp, periumbilical pain with associated nausea and decreased appetite. He thought that he had eaten something that upset his stomach. He takes Tylenol at night for prostate discomfort. Over the past few days, his abdominal pain has improved to a dull ache, 3/10 in severity, nonradiating located around umbilicus. He had constipation up until this morning when he had a large bm after a fleet enema. He is able to pass gas.  Denies fevers/chills, chest pain, palpitations, sob, dysuria, edema or weakness. Patient's last colonoscopy was 7 years ago, wnl.     In the ED, patient's vitals were: T98.5F, , /72, RR 14 and SpO2 95% on room air. Significant labs include: H/H 12.1/35.5, Na 129, trops neg x1, pro-. UA wnl. Pt received Azithromycin and Ceftriaxone.   CT A/P: he cecum is dilated and folded on itself in the right upper quadrant, changed in location from prior CT. There is wall thickening involving the terminal ileum, cecum and proximal ascending colon. Appendix is distended and air-filled measuring up to 1.3 cm at the tip.   CXR: No change heart mediastinum. Asymmetric elevation right hemidiaphragm. There is left basilar retrocardiac atelectasis/infiltrate. Correlate for active infection. Small left pleural effusion. Distended bowel visualized upper abdomen.  EKG: sinus tachycardia, , no ST elevation/depression (2019 20:10)      SUBJECTIVE & OBJECTIVE: Pt seen and examined at bedside, complainig of pain in surgical site     PHYSICAL EXAM:  T(C): 36.8 (19 @ 05:41), Max: 37.2 (19 @ 14:45)  HR: 102 (19 @ 05:41) (73 - 105)  BP: 166/96 (19 @ 10:35) (109/67 - 166/96)  RR: 17 (19 @ 05:41) (14 - 18)  SpO2: 95% (19 @ 05:41) (92% - 99%)  Wt(kg): -- Height (cm): 170.18 ( @ 14:24)  Weight (kg): 68.5 ( @ 14:24)  BMI (kg/m2): 23.7 ( @ 14:24)  BSA (m2): 1.79 ( @ 14:24)  GENERAL: NAD, well-groomed, well-developed  HEAD:  Atraumatic, Normocephalic  EYES: EOMI, PERRLA, conjunctiva and sclera clear  ENMT: Moist mucous membranes  NECK: Supple, No JVD  NERVOUS SYSTEM:  Alert & Oriented X3, Motor Strength 5/5 B/L upper and lower extremities; DTRs 2+ intact and symmetric  CHEST/LUNG: Clear to auscultation bilaterally; No rales, rhonchi, wheezing, or rubs  HEART: Regular rate and rhythm; No murmurs, rubs, or gallops  ABDOMEN: Soft, Nontender, Nondistended; Bowel sounds present  EXTREMITIES:  2+ Peripheral Pulses, No clubbing, cyanosis, or edema        MEDICATIONS  (STANDING):  atorvastatin 80 milliGRAM(s) Oral at bedtime  cefoTEtan  IVPB 2 Gram(s) IV Intermittent every 12 hours  docusate sodium 100 milliGRAM(s) Oral three times a day  enoxaparin Injectable 40 milliGRAM(s) SubCutaneous daily  finasteride 5 milliGRAM(s) Oral daily  lactated ringers. 1000 milliLiter(s) (125 mL/Hr) IV Continuous <Continuous>  metoprolol succinate ER 50 milliGRAM(s) Oral daily  NIFEdipine XL 60 milliGRAM(s) Oral daily  pantoprazole  Injectable 40 milliGRAM(s) IV Push daily  tamsulosin 0.4 milliGRAM(s) Oral at bedtime    MEDICATIONS  (PRN):  acetaminophen   Tablet .. 650 milliGRAM(s) Oral every 6 hours PRN Temp greater or equal to 38.5C (101.3F), Mild Pain (1 - 3)  HYDROmorphone  Injectable 0.5 milliGRAM(s) IV Push every 3 hours PRN Severe Pain (7 - 10)  oxyCODONE    IR 5 milliGRAM(s) Oral every 4 hours PRN Moderate Pain (4 - 6)      LABS:                        12.5   9.76  )-----------( 293      ( 2019 08:54 )             36.6         136  |  100  |  27<H>  ----------------------------<  105<H>  4.3   |  25  |  0.99    Ca    8.1<L>      2019 08:54  Mg     2.1         TPro  6.5  /  Alb  2.7<L>  /  TBili  0.5  /  DBili  x   /  AST  45<H>  /  ALT  50  /  AlkPhos  54  21    PT/INR - ( 2019 15:34 )   PT: 11.5 sec;   INR: 1.01 ratio         PTT - ( 2019 15:34 )  PTT:25.9 sec  Urinalysis Basic - ( 2019 18:43 )    Color: Yellow / Appearance: Clear / S.005 / pH: x  Gluc: x / Ketone: Negative  / Bili: Negative / Urobili: Negative   Blood: x / Protein: 25 mg/dL / Nitrite: Negative   Leuk Esterase: Negative / RBC: 0-2 /HPF / WBC x   Sq Epi: x / Non Sq Epi: Occasional / Bacteria: Occasional      Magnesium, Serum: 2.1 mg/dL ( @ 08:54)    CAPILLARY BLOOD GLUCOSE      POCT Blood Glucose.: 120 mg/dL (2019 18:51)  POCT Blood Glucose.: 99 mg/dL (2019 14:56)      CAPILLARY BLOOD GLUCOSE      POCT Blood Glucose.: 120 mg/dL (2019 18:51)  POCT Blood Glucose.: 99 mg/dL (2019 14:56)    CAPILLARY BLOOD GLUCOSE      POCT Blood Glucose.: 120 mg/dL (2019 18:51)      CARDIAC MARKERS ( 2019 15:34 )  <.015 ng/mL / x     / x     / x     / 5.0 ng/mL        RECENT CULTURES:      RADIOLOGY & ADDITIONAL TESTS:                        DVT/GI ppx  Discussed with pt @ bedside

## 2019-06-22 NOTE — PROGRESS NOTE ADULT - ASSESSMENT
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     - Monitor and replete lytes as needed  - To follow closely with you  Cody Egan [facility] ([place], [place])  Cardiology   Spectra #[phone]

## 2019-06-22 NOTE — PROGRESS NOTE ADULT - ASSESSMENT
POD 1 s/p Right hemicolectomy secondary to cecal bascule with signs of ischemic changes.  Tolerated procedure well, remains hemodynamically stable.  Awake and oriented in no significant discomfort but with expected incisional pain.  Remains NPO with expected post op ileus.  Encourage incentive spirometry  DVT and GI prophylaxis.  Will be seen by PT for assistance OOB and possible ambulation tomorrow.

## 2019-06-23 LAB
ALBUMIN SERPL ELPH-MCNC: 2.5 G/DL — LOW (ref 3.3–5)
ALP SERPL-CCNC: 61 U/L — SIGNIFICANT CHANGE UP (ref 40–120)
ALT FLD-CCNC: 36 U/L — SIGNIFICANT CHANGE UP (ref 12–78)
ANION GAP SERPL CALC-SCNC: 9 MMOL/L — SIGNIFICANT CHANGE UP (ref 5–17)
AST SERPL-CCNC: 41 U/L — HIGH (ref 15–37)
BILIRUB SERPL-MCNC: 0.5 MG/DL — SIGNIFICANT CHANGE UP (ref 0.2–1.2)
BUN SERPL-MCNC: 20 MG/DL — SIGNIFICANT CHANGE UP (ref 7–23)
CALCIUM SERPL-MCNC: 8.3 MG/DL — LOW (ref 8.5–10.1)
CHLORIDE SERPL-SCNC: 95 MMOL/L — LOW (ref 96–108)
CO2 SERPL-SCNC: 27 MMOL/L — SIGNIFICANT CHANGE UP (ref 22–31)
CREAT SERPL-MCNC: 1.2 MG/DL — SIGNIFICANT CHANGE UP (ref 0.5–1.3)
GLUCOSE SERPL-MCNC: 71 MG/DL — SIGNIFICANT CHANGE UP (ref 70–99)
HCT VFR BLD CALC: 40.4 % — SIGNIFICANT CHANGE UP (ref 39–50)
HGB BLD-MCNC: 13.8 G/DL — SIGNIFICANT CHANGE UP (ref 13–17)
MCHC RBC-ENTMCNC: 31.9 PG — SIGNIFICANT CHANGE UP (ref 27–34)
MCHC RBC-ENTMCNC: 34.2 GM/DL — SIGNIFICANT CHANGE UP (ref 32–36)
MCV RBC AUTO: 93.5 FL — SIGNIFICANT CHANGE UP (ref 80–100)
NRBC # BLD: 0 /100 WBCS — SIGNIFICANT CHANGE UP (ref 0–0)
PLATELET # BLD AUTO: 314 K/UL — SIGNIFICANT CHANGE UP (ref 150–400)
POTASSIUM SERPL-MCNC: 3.7 MMOL/L — SIGNIFICANT CHANGE UP (ref 3.5–5.3)
POTASSIUM SERPL-SCNC: 3.7 MMOL/L — SIGNIFICANT CHANGE UP (ref 3.5–5.3)
PROT SERPL-MCNC: 6.1 G/DL — SIGNIFICANT CHANGE UP (ref 6–8.3)
RBC # BLD: 4.32 M/UL — SIGNIFICANT CHANGE UP (ref 4.2–5.8)
RBC # FLD: 14 % — SIGNIFICANT CHANGE UP (ref 10.3–14.5)
SODIUM SERPL-SCNC: 131 MMOL/L — LOW (ref 135–145)
WBC # BLD: 9.49 K/UL — SIGNIFICANT CHANGE UP (ref 3.8–10.5)
WBC # FLD AUTO: 9.49 K/UL — SIGNIFICANT CHANGE UP (ref 3.8–10.5)

## 2019-06-23 PROCEDURE — 99232 SBSQ HOSP IP/OBS MODERATE 35: CPT

## 2019-06-23 PROCEDURE — 99024 POSTOP FOLLOW-UP VISIT: CPT

## 2019-06-23 RX ADMIN — OXYCODONE HYDROCHLORIDE 5 MILLIGRAM(S): 5 TABLET ORAL at 21:32

## 2019-06-23 RX ADMIN — PANTOPRAZOLE SODIUM 40 MILLIGRAM(S): 20 TABLET, DELAYED RELEASE ORAL at 12:27

## 2019-06-23 RX ADMIN — HYDROMORPHONE HYDROCHLORIDE 0.5 MILLIGRAM(S): 2 INJECTION INTRAMUSCULAR; INTRAVENOUS; SUBCUTANEOUS at 08:47

## 2019-06-23 RX ADMIN — Medication 650 MILLIGRAM(S): at 11:11

## 2019-06-23 RX ADMIN — HYDROMORPHONE HYDROCHLORIDE 0.5 MILLIGRAM(S): 2 INJECTION INTRAMUSCULAR; INTRAVENOUS; SUBCUTANEOUS at 03:45

## 2019-06-23 RX ADMIN — Medication 100 MILLIGRAM(S): at 14:45

## 2019-06-23 RX ADMIN — Medication 100 GRAM(S): at 06:14

## 2019-06-23 RX ADMIN — Medication 100 GRAM(S): at 17:19

## 2019-06-23 RX ADMIN — Medication 100 MILLIGRAM(S): at 06:14

## 2019-06-23 RX ADMIN — Medication 60 MILLIGRAM(S): at 06:14

## 2019-06-23 RX ADMIN — ATORVASTATIN CALCIUM 80 MILLIGRAM(S): 80 TABLET, FILM COATED ORAL at 21:22

## 2019-06-23 RX ADMIN — Medication 100 MILLIGRAM(S): at 21:22

## 2019-06-23 RX ADMIN — OXYCODONE HYDROCHLORIDE 5 MILLIGRAM(S): 5 TABLET ORAL at 21:22

## 2019-06-23 RX ADMIN — ENOXAPARIN SODIUM 40 MILLIGRAM(S): 100 INJECTION SUBCUTANEOUS at 12:27

## 2019-06-23 RX ADMIN — OXYCODONE HYDROCHLORIDE 5 MILLIGRAM(S): 5 TABLET ORAL at 16:29

## 2019-06-23 RX ADMIN — Medication 50 MILLIGRAM(S): at 06:14

## 2019-06-23 RX ADMIN — Medication 650 MILLIGRAM(S): at 10:11

## 2019-06-23 RX ADMIN — HYDROMORPHONE HYDROCHLORIDE 0.5 MILLIGRAM(S): 2 INJECTION INTRAMUSCULAR; INTRAVENOUS; SUBCUTANEOUS at 03:33

## 2019-06-23 RX ADMIN — FINASTERIDE 5 MILLIGRAM(S): 5 TABLET, FILM COATED ORAL at 12:27

## 2019-06-23 RX ADMIN — OXYCODONE HYDROCHLORIDE 5 MILLIGRAM(S): 5 TABLET ORAL at 15:59

## 2019-06-23 RX ADMIN — HYDROMORPHONE HYDROCHLORIDE 0.5 MILLIGRAM(S): 2 INJECTION INTRAMUSCULAR; INTRAVENOUS; SUBCUTANEOUS at 09:02

## 2019-06-23 NOTE — PROGRESS NOTE ADULT - SUBJECTIVE AND OBJECTIVE BOX
Patient is a 89y old  Male who presents with a chief complaint of abdominal pain (22 Jun 2019 15:25)        HPI:  89M w/pmh of CAD s/p CABG (1995), b/l inguinal hernia repairs, enlarged prostate s/p TURP x2, HTN and HLD presenting with abdominal pain for 4 days. States that after dinner on Sunday, he had a sharp, periumbilical pain with associated nausea and decreased appetite. He thought that he had eaten something that upset his stomach. He takes Tylenol at night for prostate discomfort. Over the past few days, his abdominal pain has improved to a dull ache, 3/10 in severity, nonradiating located around umbilicus. He had constipation up until this morning when he had a large bm after a fleet enema. He is able to pass gas.  Denies fevers/chills, chest pain, palpitations, sob, dysuria, edema or weakness. Patient's last colonoscopy was 7 years ago, wnl.     In the ED, patient's vitals were: T98.5F, , /72, RR 14 and SpO2 95% on room air. Significant labs include: H/H 12.1/35.5, Na 129, trops neg x1, pro-. UA wnl. Pt received Azithromycin and Ceftriaxone.   CT A/P: he cecum is dilated and folded on itself in the right upper quadrant, changed in location from prior CT. There is wall thickening involving the terminal ileum, cecum and proximal ascending colon. Appendix is distended and air-filled measuring up to 1.3 cm at the tip.   CXR: No change heart mediastinum. Asymmetric elevation right hemidiaphragm. There is left basilar retrocardiac atelectasis/infiltrate. Correlate for active infection. Small left pleural effusion. Distended bowel visualized upper abdomen.  EKG: sinus tachycardia, , no ST elevation/depression (20 Jun 2019 20:10)      SUBJECTIVE & OBJECTIVE: Pt seen and examined at bedside, mild abdominal pain     PHYSICAL EXAM:  T(C): 36.9 (06-23-19 @ 04:45), Max: 37.2 (06-22-19 @ 18:20)  HR: 97 (06-23-19 @ 10:21) (95 - 120)  BP: 146/86 (06-23-19 @ 10:21) (123/66 - 180/90)  RR: 16 (06-23-19 @ 04:45) (16 - 19)  SpO2: 93% (06-23-19 @ 10:21) (93% - 96%)  Wt(kg): --   GENERAL: NAD, well-groomed, well-developed  HEAD:  Atraumatic, Normocephalic  EYES: EOMI, PERRLA, conjunctiva and sclera clear  ENMT: Moist mucous membranes  NECK: Supple, No JVD  NERVOUS SYSTEM:  Alert & Oriented X3,  CHEST/LUNG: decrease air entry at the bases   HEART: Regular rate and rhythm; No murmurs, rubs, or gallops  ABDOMEN: Soft, Nontender, Nondistended; Bowel sounds present  EXTREMITIES:  2+ Peripheral Pulses, No clubbing, cyanosis, or edema        MEDICATIONS  (STANDING):  atorvastatin 80 milliGRAM(s) Oral at bedtime  cefoTEtan  IVPB 2 Gram(s) IV Intermittent every 12 hours  docusate sodium 100 milliGRAM(s) Oral three times a day  enoxaparin Injectable 40 milliGRAM(s) SubCutaneous daily  finasteride 5 milliGRAM(s) Oral daily  lactated ringers. 1000 milliLiter(s) (125 mL/Hr) IV Continuous <Continuous>  metoprolol succinate ER 50 milliGRAM(s) Oral daily  NIFEdipine XL 60 milliGRAM(s) Oral daily  pantoprazole  Injectable 40 milliGRAM(s) IV Push daily  tamsulosin 0.4 milliGRAM(s) Oral at bedtime    MEDICATIONS  (PRN):  acetaminophen   Tablet .. 650 milliGRAM(s) Oral every 6 hours PRN Temp greater or equal to 38.5C (101.3F), Mild Pain (1 - 3)  HYDROmorphone  Injectable 0.5 milliGRAM(s) IV Push every 3 hours PRN Severe Pain (7 - 10)  oxyCODONE    IR 5 milliGRAM(s) Oral every 4 hours PRN Moderate Pain (4 - 6)      LABS:                        13.8   9.49  )-----------( 314      ( 23 Jun 2019 08:20 )             40.4     06-23    131<L>  |  95<L>  |  20  ----------------------------<  71  3.7   |  27  |  1.20    Ca    8.3<L>      23 Jun 2019 08:20  Mg     2.1     06-22    TPro  6.1  /  Alb  2.5<L>  /  TBili  0.5  /  DBili  x   /  AST  41<H>  /  ALT  36  /  AlkPhos  61  06-23          CAPILLARY BLOOD GLUCOSE          CAPILLARY BLOOD GLUCOSE        CAPILLARY BLOOD GLUCOSE      POCT Blood Glucose.: 120 mg/dL (21 Jun 2019 18:51)            RECENT CULTURES:      RADIOLOGY & ADDITIONAL TESTS:                        DVT/GI ppx  Discussed with pt @ bedside

## 2019-06-23 NOTE — PROGRESS NOTE ADULT - SUBJECTIVE AND OBJECTIVE BOX
POD # 3 s/p Right hemicolectomy.  Weaver catheter removed.  Reports flatus and BM.    Vital Signs Last 24 Hrs  T(C): 36.9 (23 Jun 2019 04:45), Max: 37.2 (22 Jun 2019 18:20)  T(F): 98.4 (23 Jun 2019 04:45), Max: 98.9 (22 Jun 2019 18:20)  HR: 97 (23 Jun 2019 10:21) (95 - 120)  BP: 146/86 (23 Jun 2019 10:21) (123/66 - 180/90)  BP(mean): --  RR: 16 (23 Jun 2019 04:45) (16 - 19)  SpO2: 93% (23 Jun 2019 10:21) (93% - 96%)    06-22 @ 07:01  -  06-23 @ 07:00  --------------------------------------------------------  IN: 1500 mL / OUT: 2150 mL / NET: -650 mL                              13.8   9.49  )-----------( 314      ( 23 Jun 2019 08:20 )             40.4                         12.5   9.76  )-----------( 293      ( 22 Jun 2019 08:54 )             36.6                         11.2   6.90  )-----------( 230      ( 21 Jun 2019 07:54 )             32.2                         12.1   10.29 )-----------( 234      ( 20 Jun 2019 15:34 )             35.5       06-23    131<L>  |  95<L>  |  20  ----------------------------<  71  3.7   |  27  |  1.20    Ca    8.3<L>      23 Jun 2019 08:20  Mg     2.1     06-22    TPro  6.1  /  Alb  2.5<L>  /  TBili  0.5  /  DBili  x   /  AST  41<H>  /  ALT  36  /  AlkPhos  61  06-23      Physical Exam:  Awake alert and oriented x3 in no acute distress. NCAT, PERRLA, EOMI  Lungs clear bilaterally without wheezes rhonchi or rales.  Regular rate and rhythm  Abdomen soft, nontender, nondistended, positive bowel sounds in all 4 quadrants. No evidence of hernia or masses. Surgical incisions remained well approximated and healing appropriately without erythema, induration or fluctuance. Dressings remained clean dry and intact  Extremities without edema or tenderness. Covering Dr Tse    POD # 3 s/p Right hemicolectomy.    Vital Signs Last 24 Hrs  T(C): 36.9 (23 Jun 2019 04:45), Max: 37.2 (22 Jun 2019 18:20)  T(F): 98.4 (23 Jun 2019 04:45), Max: 98.9 (22 Jun 2019 18:20)  HR: 97 (23 Jun 2019 10:21) (95 - 120)  BP: 146/86 (23 Jun 2019 10:21) (123/66 - 180/90)  BP(mean): --  RR: 16 (23 Jun 2019 04:45) (16 - 19)  SpO2: 93% (23 Jun 2019 10:21) (93% - 96%)    06-22 @ 07:01  -  06-23 @ 07:00  --------------------------------------------------------  IN: 1500 mL / OUT: 2150 mL / NET: -650 mL                              13.8   9.49  )-----------( 314      ( 23 Jun 2019 08:20 )             40.4                         12.5   9.76  )-----------( 293      ( 22 Jun 2019 08:54 )             36.6                         11.2   6.90  )-----------( 230      ( 21 Jun 2019 07:54 )             32.2                         12.1   10.29 )-----------( 234      ( 20 Jun 2019 15:34 )             35.5       06-23    131<L>  |  95<L>  |  20  ----------------------------<  71  3.7   |  27  |  1.20    Ca    8.3<L>      23 Jun 2019 08:20  Mg     2.1     06-22    TPro  6.1  /  Alb  2.5<L>  /  TBili  0.5  /  DBili  x   /  AST  41<H>  /  ALT  36  /  AlkPhos  61  06-23      Physical Exam:  Awake alert and oriented x3 in no acute distress. NCAT, PERRLA, EOMI  Lungs clear bilaterally without wheezes rhonchi or rales.  Regular rate and rhythm  Abdomen soft, nontender, nondistended, positive bowel sounds in all 4 quadrants. No evidence of hernia or masses. Surgical incisions remained well approximated and healing appropriately without erythema, induration or fluctuance. Dressings remained clean dry and intact  Extremities without edema or tenderness.

## 2019-06-23 NOTE — DIETITIAN INITIAL EVALUATION ADULT. - ORAL INTAKE PTA
Poor x1 week 2/2 abdominal pain. Aside from that, good appetite/intake. Soup, cottage cheese, fruit.

## 2019-06-23 NOTE — DIETITIAN INITIAL EVALUATION ADULT. - PHYSICAL APPEARANCE
Keri- Dr. Menon office- notified pt taking cipro for uti- Keri to notify Dr. Menon- no orders received   well nourished/BMI 23.6 kg/m2

## 2019-06-23 NOTE — DIETITIAN INITIAL EVALUATION ADULT. - ENERGY NEEDS
Wt: 151 pounds, Ht: 67 inches, BMI: 23.6 kg/m2, IBW: 135 pounds  +/-10%, %IBW: >100%  no edema or pressure injuries

## 2019-06-23 NOTE — DIETITIAN INITIAL EVALUATION ADULT. - PROBLEM SELECTOR PLAN 4
CT A/P showed small right upper quadrant ascites.  - Consider CHF as proBNP elevated 758  - Check ECHO

## 2019-06-23 NOTE — PROGRESS NOTE ADULT - SUBJECTIVE AND OBJECTIVE BOX
[facility] - Nicole Mayer, Radha, Zachery, Lucinda, Jen Payne  Office Number:  [phone]    Patient resting comfortably in bed in NAD.  Laying flat with no respiratory distress.  No complaints of chest pain, dyspnea, palpitations, PND, or orthopnea.    F/U for:  CAD         MEDICATIONS  (STANDING):  atorvastatin 80 milliGRAM(s) Oral at bedtime  cefoTEtan  IVPB 2 Gram(s) IV Intermittent every 12 hours  docusate sodium 100 milliGRAM(s) Oral three times a day  enoxaparin Injectable 40 milliGRAM(s) SubCutaneous daily  finasteride 5 milliGRAM(s) Oral daily  lactated ringers. 1000 milliLiter(s) (125 mL/Hr) IV Continuous <Continuous>  metoprolol succinate ER 50 milliGRAM(s) Oral daily  NIFEdipine XL 60 milliGRAM(s) Oral daily  pantoprazole  Injectable 40 milliGRAM(s) IV Push daily  tamsulosin 0.4 milliGRAM(s) Oral at bedtime    MEDICATIONS  (PRN):  acetaminophen   Tablet .. 650 milliGRAM(s) Oral every 6 hours PRN Temp greater or equal to 38.5C (101.3F), Mild Pain (1 - 3)  HYDROmorphone  Injectable 0.5 milliGRAM(s) IV Push every 3 hours PRN Severe Pain (7 - 10)  oxyCODONE    IR 5 milliGRAM(s) Oral every 4 hours PRN Moderate Pain (4 - 6)      Allergies    No Known Allergies    Intolerances        Vital Signs Last 24 Hrs  T(C): 36.9 (23 Jun 2019 04:45), Max: 37.2 (22 Jun 2019 18:20)  T(F): 98.4 (23 Jun 2019 04:45), Max: 98.9 (22 Jun 2019 18:20)  HR: 97 (23 Jun 2019 10:21) (95 - 120)  BP: 146/86 (23 Jun 2019 10:21) (123/66 - 180/90)  BP(mean): --  RR: 16 (23 Jun 2019 04:45) (16 - 19)  SpO2: 93% (23 Jun 2019 10:21) (93% - 96%)    I&O's Summary    22 Jun 2019 07:01  -  23 Jun 2019 07:00  --------------------------------------------------------  IN: 1500 mL / OUT: 2150 mL / NET: -650 mL        ON EXAM:    General: NAD, awake and alert, oriented x 3  HEENT: Mucous membranes are moist, anicteric  Lungs: Non-labored, breath sounds are clear bilaterally, No wheezing, rales or rhonchi  Cardiovascular: Regular, S1 and S2, no murmurs, rubs, or gallops  Gastrointestinal: Bowel Sounds present, soft, nontender.  post op belly.  tender  Lymph: No peripheral edema. No lymphadenopathy.  Skin: No rashes or ulcers  Psych:  Mood & affect appropriate    LABS: All Labs Reviewed:                        13.8   9.49  )-----------( 314      ( 23 Jun 2019 08:20 )             40.4                         12.5   9.76  )-----------( 293      ( 22 Jun 2019 08:54 )             36.6                         11.2   6.90  )-----------( 230      ( 21 Jun 2019 07:54 )             32.2     23 Jun 2019 08:20    131    |  95     |  20     ----------------------------<  71     3.7     |  27     |  1.20   22 Jun 2019 08:54    136    |  100    |  27     ----------------------------<  105    4.3     |  25     |  0.99   21 Jun 2019 07:54    136    |  100    |  28     ----------------------------<  98     3.9     |  27     |  1.00     Ca    8.3        23 Jun 2019 08:20  Ca    8.1        22 Jun 2019 08:54  Ca    8.4        21 Jun 2019 07:54  Mg     2.1       22 Jun 2019 08:54    TPro  6.1    /  Alb  2.5    /  TBili  0.5    /  DBili  x      /  AST  41     /  ALT  36     /  AlkPhos  61     23 Jun 2019 08:20  TPro  6.5    /  Alb  2.7    /  TBili  0.5    /  DBili  x      /  AST  45     /  ALT  50     /  AlkPhos  54     21 Jun 2019 07:54  TPro  7.3    /  Alb  3.2    /  TBili  0.6    /  DBili  x      /  AST  48     /  ALT  52     /  AlkPhos  64     20 Jun 2019 15:34          Blood Culture:   06-20 @ 15:34  Pro Bnp 758

## 2019-06-23 NOTE — DIETITIAN INITIAL EVALUATION ADULT. - PROBLEM SELECTOR PLAN 3
s/p CABG, 1995  - will hold ASA in event of patient needs surgical procedure.   - Continue Toprol XL 50mg qd with hold parameters  - Cardio consult ([facility] group consulted) for optimization

## 2019-06-23 NOTE — PROGRESS NOTE ADULT - ASSESSMENT
Doing well.  NGT remains to wall suction.  Green, bilious aspirates.  Bowel sounds present.  Will begin clamp trial on NGT.  If after 4 hrs residuals less than 250, ngt can be removed.  If greater than 250ml return to wall suction.  Encourage OOB and incentive spirometry. Doing well.  OOB to chair in no distress  Bowel sounds present. Incision clean and dry.  Staples intact.  No signs of infection.  Awaiting return of bowel function.  Remains NPO.  Encourage incentive spirometry.  PT for reconditioning and assistance with ambulation.

## 2019-06-23 NOTE — PROGRESS NOTE ADULT - ASSESSMENT
89M w/pmh of CAD s/p CABG (1995), b/l inguinal hernia repairs, enlarged prostate s/p TURP x2, HTN and HLD presenting with abdominal pain for 4 days. Admitted to [facility] with cecal volvulus, was taken to the OR found ot have   Ischemic and obstructed right cecum/ colon., underRight hemicolectomy with removal of terminal, ileum and ileocolostomy tolerated proecdure well

## 2019-06-23 NOTE — DIETITIAN INITIAL EVALUATION ADULT. - PROBLEM SELECTOR PLAN 2
Unknown if chronic. Current Na 129  - Pt is asymptomatic, not confused  - Continue normal saline IVF @ 80 cc/hr for 12 hours. Readdress in AM

## 2019-06-23 NOTE — DIETITIAN INITIAL EVALUATION ADULT. - NS AS NUTRI INTERV MEALS SNACK
Advance diet as medically feasible. Consider clear liquids advanced to low fiber as tolerated. Encourage po intake of small frequent meals as tolerated. Record daily weights to assess for weight changes.

## 2019-06-23 NOTE — DIETITIAN INITIAL EVALUATION ADULT. - PROBLEM SELECTOR PLAN 5
CXR showed Small left pleural effusion.   - Pt asymptomatic, saturating well  - Continue to monitor  - IVF for hypernatremia for 12 hours.

## 2019-06-23 NOTE — DIETITIAN INITIAL EVALUATION ADULT. - OTHER INFO
Pt seen for nutrition assessment for NPO x3 days. 89M w/ PMH of CAD s/p CABG (1995), b/l inguinal hernia repairs, enlarged prostate s/p TURP x2, HTN and HLD presenting with abdominal pain for 4 days - POD # 3 s/p Right hemicolectomy. Endorses  pounds, likely some wt loss x1 week 2/2 NPO status maintained. Unable to obtain new wt given pt OOB on chair and fall risk. Awaiting return of bowel function, not passing gas at present and no BM noted. NKFA, no dysphagia.

## 2019-06-23 NOTE — DIETITIAN INITIAL EVALUATION ADULT. - PROBLEM SELECTOR PLAN 9
IMPROVE VTE Individual Risk Assessment          RISK                                                          Points  [  ] Previous VTE                                                3  [  ] Thrombophilia                                             2  [  ] Lower limb paralysis                                   2        (unable to hold up >15 seconds)    [  ] Current Cancer                                             2         (within 6 months)  [  ] Immobilization > 24 hrs                              1  [  ] ICU/CCU stay > 24 hours                             1  [x  ] Age > 60                                                         1    IMPROVE VTE Score: 1  DVT ppx: SCD's, no pharm DVT ppx in event patient needs surgical intervention

## 2019-06-23 NOTE — PROGRESS NOTE ADULT - ASSESSMENT
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     - Monitor and replete lytes as needed  - To follow closely with you

## 2019-06-24 ENCOUNTER — TRANSCRIPTION ENCOUNTER (OUTPATIENT)
Age: 84
End: 2019-06-24

## 2019-06-24 LAB
ALBUMIN SERPL ELPH-MCNC: 2.2 G/DL — LOW (ref 3.3–5)
ALP SERPL-CCNC: 46 U/L — SIGNIFICANT CHANGE UP (ref 40–120)
ALT FLD-CCNC: 32 U/L — SIGNIFICANT CHANGE UP (ref 12–78)
ANION GAP SERPL CALC-SCNC: 9 MMOL/L — SIGNIFICANT CHANGE UP (ref 5–17)
AST SERPL-CCNC: 46 U/L — HIGH (ref 15–37)
BILIRUB SERPL-MCNC: 0.4 MG/DL — SIGNIFICANT CHANGE UP (ref 0.2–1.2)
BUN SERPL-MCNC: 24 MG/DL — HIGH (ref 7–23)
CALCIUM SERPL-MCNC: 8.3 MG/DL — LOW (ref 8.5–10.1)
CHLORIDE SERPL-SCNC: 95 MMOL/L — LOW (ref 96–108)
CO2 SERPL-SCNC: 30 MMOL/L — SIGNIFICANT CHANGE UP (ref 22–31)
CREAT SERPL-MCNC: 1.4 MG/DL — HIGH (ref 0.5–1.3)
GLUCOSE SERPL-MCNC: 91 MG/DL — SIGNIFICANT CHANGE UP (ref 70–99)
HCT VFR BLD CALC: 35.2 % — LOW (ref 39–50)
HGB BLD-MCNC: 12.2 G/DL — LOW (ref 13–17)
MCHC RBC-ENTMCNC: 32.4 PG — SIGNIFICANT CHANGE UP (ref 27–34)
MCHC RBC-ENTMCNC: 34.7 GM/DL — SIGNIFICANT CHANGE UP (ref 32–36)
MCV RBC AUTO: 93.4 FL — SIGNIFICANT CHANGE UP (ref 80–100)
NRBC # BLD: 0 /100 WBCS — SIGNIFICANT CHANGE UP (ref 0–0)
PLATELET # BLD AUTO: 338 K/UL — SIGNIFICANT CHANGE UP (ref 150–400)
POTASSIUM SERPL-MCNC: 3.7 MMOL/L — SIGNIFICANT CHANGE UP (ref 3.5–5.3)
POTASSIUM SERPL-SCNC: 3.7 MMOL/L — SIGNIFICANT CHANGE UP (ref 3.5–5.3)
PROT SERPL-MCNC: 5.8 G/DL — LOW (ref 6–8.3)
RBC # BLD: 3.77 M/UL — LOW (ref 4.2–5.8)
RBC # FLD: 13.5 % — SIGNIFICANT CHANGE UP (ref 10.3–14.5)
SODIUM SERPL-SCNC: 134 MMOL/L — LOW (ref 135–145)
WBC # BLD: 7.4 K/UL — SIGNIFICANT CHANGE UP (ref 3.8–10.5)
WBC # FLD AUTO: 7.4 K/UL — SIGNIFICANT CHANGE UP (ref 3.8–10.5)

## 2019-06-24 PROCEDURE — 99232 SBSQ HOSP IP/OBS MODERATE 35: CPT

## 2019-06-24 RX ADMIN — ATORVASTATIN CALCIUM 80 MILLIGRAM(S): 80 TABLET, FILM COATED ORAL at 21:33

## 2019-06-24 RX ADMIN — HYDROMORPHONE HYDROCHLORIDE 0.5 MILLIGRAM(S): 2 INJECTION INTRAMUSCULAR; INTRAVENOUS; SUBCUTANEOUS at 05:59

## 2019-06-24 RX ADMIN — OXYCODONE HYDROCHLORIDE 5 MILLIGRAM(S): 5 TABLET ORAL at 02:38

## 2019-06-24 RX ADMIN — Medication 50 MILLIGRAM(S): at 05:37

## 2019-06-24 RX ADMIN — SODIUM CHLORIDE 125 MILLILITER(S): 9 INJECTION, SOLUTION INTRAVENOUS at 12:01

## 2019-06-24 RX ADMIN — ENOXAPARIN SODIUM 40 MILLIGRAM(S): 100 INJECTION SUBCUTANEOUS at 12:01

## 2019-06-24 RX ADMIN — Medication 100 GRAM(S): at 17:33

## 2019-06-24 RX ADMIN — FINASTERIDE 5 MILLIGRAM(S): 5 TABLET, FILM COATED ORAL at 12:01

## 2019-06-24 RX ADMIN — HYDROMORPHONE HYDROCHLORIDE 0.5 MILLIGRAM(S): 2 INJECTION INTRAMUSCULAR; INTRAVENOUS; SUBCUTANEOUS at 05:39

## 2019-06-24 RX ADMIN — HYDROMORPHONE HYDROCHLORIDE 0.5 MILLIGRAM(S): 2 INJECTION INTRAMUSCULAR; INTRAVENOUS; SUBCUTANEOUS at 21:48

## 2019-06-24 RX ADMIN — TAMSULOSIN HYDROCHLORIDE 0.4 MILLIGRAM(S): 0.4 CAPSULE ORAL at 21:33

## 2019-06-24 RX ADMIN — Medication 100 MILLIGRAM(S): at 21:33

## 2019-06-24 RX ADMIN — PANTOPRAZOLE SODIUM 40 MILLIGRAM(S): 20 TABLET, DELAYED RELEASE ORAL at 12:01

## 2019-06-24 RX ADMIN — Medication 100 MILLIGRAM(S): at 05:37

## 2019-06-24 RX ADMIN — Medication 100 MILLIGRAM(S): at 14:08

## 2019-06-24 RX ADMIN — Medication 60 MILLIGRAM(S): at 05:37

## 2019-06-24 RX ADMIN — Medication 100 GRAM(S): at 05:38

## 2019-06-24 RX ADMIN — OXYCODONE HYDROCHLORIDE 5 MILLIGRAM(S): 5 TABLET ORAL at 01:27

## 2019-06-24 RX ADMIN — HYDROMORPHONE HYDROCHLORIDE 0.5 MILLIGRAM(S): 2 INJECTION INTRAMUSCULAR; INTRAVENOUS; SUBCUTANEOUS at 21:33

## 2019-06-24 NOTE — PROGRESS NOTE ADULT - ASSESSMENT
Excellent clinical progress.  Appears surgically stable.  Passing small volumes of flatus this pm.  Non-suggestive vitals.  Benign exam with clean wound.  Reassuring labs.    As such:  Tapering IVF.  Rechecking lytes tomorrow.  Starting clears.  Mobilize.  Plan for Weaver out tomorrow.

## 2019-06-24 NOTE — PHYSICAL THERAPY INITIAL EVALUATION ADULT - PERTINENT HX OF CURRENT PROBLEM, REHAB EVAL
89 y/o male adm 6/20 with abdominal pain, + cecal volvulus. 6/21- s/p R hemicolectomy with removal of terminal ileum and ileocolostomy

## 2019-06-24 NOTE — PROGRESS NOTE ADULT - SUBJECTIVE AND OBJECTIVE BOX
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  No overnight events.  Patient with no new complaints at this time, not passing flatus, no BM.  Patient denies any fevers, chills, chest pain, shortness of breath, nausea, vomiting or diarrhea.    Vital Signs Last 24 Hrs  T(C): 36.6 (24 Jun 2019 13:24), Max: 36.9 (24 Jun 2019 05:01)  T(F): 97.8 (24 Jun 2019 13:24), Max: 98.4 (24 Jun 2019 05:01)  HR: 98 (24 Jun 2019 13:24) (87 - 102)  BP: 130/78 (24 Jun 2019 13:24) (102/62 - 130/78)  BP(mean): --  RR: 16 (24 Jun 2019 13:24) (16 - 17)  SpO2: 96% (24 Jun 2019 13:24) (94% - 97%)    PHYSICAL EXAM  GENERAL:  Elderly male lying comfortably in bed in NAD  HEAD:  Normocephalic, atraumatic  ABDOMEN:  Soft, nondistended, mild incisional TTP.  Midline incision stapled without erythema, drainage or foul smell.  Bowel sounds absent.  No rebound tenderness or guarding.  NEURO:  A&O x 3    I&O's Summary    23 Jun 2019 07:01  -  24 Jun 2019 07:00  --------------------------------------------------------  IN: 2940 mL / OUT: 800 mL / NET: 2140 mL    I&O's Detail    23 Jun 2019 07:01  -  24 Jun 2019 07:00  --------------------------------------------------------  IN:    lactated ringers.: 2890 mL    Solution: 50 mL  Total IN: 2940 mL    OUT:    Indwelling Catheter - Urethral: 800 mL  Total OUT: 800 mL    Total NET: 2140 mL    MEDICATIONS  (STANDING):  atorvastatin 80 milliGRAM(s) Oral at bedtime  cefoTEtan  IVPB 2 Gram(s) IV Intermittent every 12 hours  docusate sodium 100 milliGRAM(s) Oral three times a day  enoxaparin Injectable 40 milliGRAM(s) SubCutaneous daily  finasteride 5 milliGRAM(s) Oral daily  lactated ringers. 1000 milliLiter(s) (125 mL/Hr) IV Continuous <Continuous>  metoprolol succinate ER 50 milliGRAM(s) Oral daily  NIFEdipine XL 60 milliGRAM(s) Oral daily  pantoprazole  Injectable 40 milliGRAM(s) IV Push daily  tamsulosin 0.4 milliGRAM(s) Oral at bedtime    MEDICATIONS  (PRN):  acetaminophen   Tablet .. 650 milliGRAM(s) Oral every 6 hours PRN Temp greater or equal to 38.5C (101.3F), Mild Pain (1 - 3)  HYDROmorphone  Injectable 0.5 milliGRAM(s) IV Push every 3 hours PRN Severe Pain (7 - 10)  oxyCODONE    IR 5 milliGRAM(s) Oral every 4 hours PRN Moderate Pain (4 - 6)    LABS:                        12.2   7.40  )-----------( 338      ( 24 Jun 2019 07:35 )             35.2     06-24    134<L>  |  95<L>  |  24<H>  ----------------------------<  91  3.7   |  30  |  1.40<H>    Ca    8.3<L>      24 Jun 2019 07:35    TPro  5.8<L>  /  Alb  2.2<L>  /  TBili  0.4  /  DBili  x   /  AST  46<H>  /  ALT  32  /  AlkPhos  46  06-24    ASSESSMENT & PLAN  89 year old male POD# 3 s/p right hemicolectomy with removal of terminal ileum with 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    - Continue NPO/IVF  - Continue cefotetan  - Serial abdominal exams, monitor for return of GI function  - DVT prophylaxis with lovenox  - OOB, pain control  - Case to be discussed with Dr. Tse

## 2019-06-24 NOTE — PROGRESS NOTE ADULT - SUBJECTIVE AND OBJECTIVE BOX
Patient is a 89y old  Male who presents with a chief complaint of abdominal pain (24 Jun 2019 08:39)        HPI:  89M w/pmh of CAD s/p CABG (1995), b/l inguinal hernia repairs, enlarged prostate s/p TURP x2, HTN and HLD presenting with abdominal pain for 4 days. States that after dinner on Sunday, he had a sharp, periumbilical pain with associated nausea and decreased appetite. He thought that he had eaten something that upset his stomach. He takes Tylenol at night for prostate discomfort. Over the past few days, his abdominal pain has improved to a dull ache, 3/10 in severity, nonradiating located around umbilicus. He had constipation up until this morning when he had a large bm after a fleet enema. He is able to pass gas.  Denies fevers/chills, chest pain, palpitations, sob, dysuria, edema or weakness. Patient's last colonoscopy was 7 years ago, wnl.     In the ED, patient's vitals were: T98.5F, , /72, RR 14 and SpO2 95% on room air. Significant labs include: H/H 12.1/35.5, Na 129, trops neg x1, pro-. UA wnl. Pt received Azithromycin and Ceftriaxone.   CT A/P: he cecum is dilated and folded on itself in the right upper quadrant, changed in location from prior CT. There is wall thickening involving the terminal ileum, cecum and proximal ascending colon. Appendix is distended and air-filled measuring up to 1.3 cm at the tip.   CXR: No change heart mediastinum. Asymmetric elevation right hemidiaphragm. There is left basilar retrocardiac atelectasis/infiltrate. Correlate for active infection. Small left pleural effusion. Distended bowel visualized upper abdomen.  EKG: sinus tachycardia, , no ST elevation/depression (20 Jun 2019 20:10)      SUBJECTIVE & OBJECTIVE: Pt seen and examined at bedside, pain in the surgical incision site     PHYSICAL EXAM:  T(C): 36.9 (06-24-19 @ 05:01), Max: 36.9 (06-24-19 @ 05:01)  HR: 99 (06-24-19 @ 05:01) (94 - 102)  BP: 118/67 (06-24-19 @ 05:01) (102/62 - 134/68)  RR: 17 (06-24-19 @ 05:01) (17 - 17)  SpO2: 94% (06-24-19 @ 05:01) (93% - 94%)  Wt(kg): --   GENERAL: NAD, well-groomed, well-developed  HEAD:  Atraumatic, Normocephalic  EYES: EOMI, PERRLA, conjunctiva and sclera clear  ENMT: Moist mucous membranes  NECK: Supple, No JVD  NERVOUS SYSTEM:  Alert & Oriented X3,   CHEST/LUNG: decrease air entry at the bases   HEART: Regular rate and rhythm; No murmurs, rubs, or gallops  ABDOMEN: Soft, Nontender, Nondistended; Bowel sounds present  EXTREMITIES:  2+ Peripheral Pulses, No clubbing, cyanosis, or edema        MEDICATIONS  (STANDING):  atorvastatin 80 milliGRAM(s) Oral at bedtime  cefoTEtan  IVPB 2 Gram(s) IV Intermittent every 12 hours  docusate sodium 100 milliGRAM(s) Oral three times a day  enoxaparin Injectable 40 milliGRAM(s) SubCutaneous daily  finasteride 5 milliGRAM(s) Oral daily  lactated ringers. 1000 milliLiter(s) (125 mL/Hr) IV Continuous <Continuous>  metoprolol succinate ER 50 milliGRAM(s) Oral daily  NIFEdipine XL 60 milliGRAM(s) Oral daily  pantoprazole  Injectable 40 milliGRAM(s) IV Push daily  tamsulosin 0.4 milliGRAM(s) Oral at bedtime    MEDICATIONS  (PRN):  acetaminophen   Tablet .. 650 milliGRAM(s) Oral every 6 hours PRN Temp greater or equal to 38.5C (101.3F), Mild Pain (1 - 3)  HYDROmorphone  Injectable 0.5 milliGRAM(s) IV Push every 3 hours PRN Severe Pain (7 - 10)  oxyCODONE    IR 5 milliGRAM(s) Oral every 4 hours PRN Moderate Pain (4 - 6)      LABS:                        12.2   7.40  )-----------( 338      ( 24 Jun 2019 07:35 )             35.2     06-24    134<L>  |  95<L>  |  24<H>  ----------------------------<  91  3.7   |  30  |  1.40<H>    Ca    8.3<L>      24 Jun 2019 07:35    TPro  5.8<L>  /  Alb  2.2<L>  /  TBili  0.4  /  DBili  x   /  AST  46<H>  /  ALT  32  /  AlkPhos  46  06-24          CAPILLARY BLOOD GLUCOSE          CAPILLARY BLOOD GLUCOSE        CAPILLARY BLOOD GLUCOSE                RECENT CULTURES:      RADIOLOGY & ADDITIONAL TESTS:                        DVT/GI ppx  Discussed with pt @ bedside

## 2019-06-24 NOTE — PROGRESS NOTE ADULT - SUBJECTIVE AND OBJECTIVE BOX
[facility] -- Nicole Mayer, Zachery Garcia Pannella, Patel, Savella  Office # [phone]    Follow Up:  Preop Eval    Subjective/Observations: Awake and alert, nasal cannula in use but not in any discomfort.  c/o abdominal tenderness, not passing gas as yet.  Denies nausea or vomiting.  Denies any respiratory or casrdiac discomfort    REVIEW OF SYSTEMS: All other review of systems is negative unless indicated above  PAST MEDICAL & SURGICAL HISTORY:  CAD (coronary artery disease): s/p CABG, 1995  Anginal pain  HTN (hypertension)  Enlarged prostate  H/O inguinal hernia repair: bilateral  S/P TURP: x2    MEDICATIONS  (STANDING):  atorvastatin 80 milliGRAM(s) Oral at bedtime  cefoTEtan  IVPB 2 Gram(s) IV Intermittent every 12 hours  docusate sodium 100 milliGRAM(s) Oral three times a day  enoxaparin Injectable 40 milliGRAM(s) SubCutaneous daily  finasteride 5 milliGRAM(s) Oral daily  lactated ringers. 1000 milliLiter(s) (125 mL/Hr) IV Continuous <Continuous>  metoprolol succinate ER 50 milliGRAM(s) Oral daily  NIFEdipine XL 60 milliGRAM(s) Oral daily  pantoprazole  Injectable 40 milliGRAM(s) IV Push daily  tamsulosin 0.4 milliGRAM(s) Oral at bedtime    MEDICATIONS  (PRN):  acetaminophen   Tablet .. 650 milliGRAM(s) Oral every 6 hours PRN Temp greater or equal to 38.5C (101.3F), Mild Pain (1 - 3)  HYDROmorphone  Injectable 0.5 milliGRAM(s) IV Push every 3 hours PRN Severe Pain (7 - 10)  oxyCODONE    IR 5 milliGRAM(s) Oral every 4 hours PRN Moderate Pain (4 - 6)    Allergies    No Known Allergies    Intolerances    Vital Signs Last 24 Hrs  T(C): 36.9 (24 Jun 2019 05:01), Max: 36.9 (24 Jun 2019 05:01)  T(F): 98.4 (24 Jun 2019 05:01), Max: 98.4 (24 Jun 2019 05:01)  HR: 99 (24 Jun 2019 05:01) (94 - 102)  BP: 118/67 (24 Jun 2019 05:01) (102/62 - 146/86)  BP(mean): --  RR: 17 (24 Jun 2019 05:01) (17 - 17)  SpO2: 94% (24 Jun 2019 05:01) (93% - 94%)  I&O's Summary    23 Jun 2019 07:01  -  24 Jun 2019 07:00  --------------------------------------------------------  IN: 2940 mL / OUT: 800 mL / NET: 2140 mL     PHYSICAL EXAM:  TELE: Not on tele  Constitutional: NAD, awake and alert, well-developed  HEENT: Moist Mucous Membranes, Anicteric  Pulmonary: Non-labored, breath sounds are clear bilaterally, No wheezing, rales or rhonchi  Cardiovascular: Regular, S1 and S2, + murmurs, rubs, gallops or clicks  Gastrointestinal: Bowel Sounds present, soft, nontender.   Lymph: No peripheral edema. No lymphadenopathy.  Skin: No visible rashes or ulcers.  Abdominal incision with staples intact  Psych:  Mood & affect appropriate  LABS: All Labs Reviewed:                        12.2   7.40  )-----------( 338      ( 24 Jun 2019 07:35 )             35.2                         13.8   9.49  )-----------( 314      ( 23 Jun 2019 08:20 )             40.4                         12.5   9.76  )-----------( 293      ( 22 Jun 2019 08:54 )             36.6     24 Jun 2019 07:35    134    |  95     |  24     ----------------------------<  91     3.7     |  30     |  1.40   23 Jun 2019 08:20    131    |  95     |  20     ----------------------------<  71     3.7     |  27     |  1.20   22 Jun 2019 08:54    136    |  100    |  27     ----------------------------<  105    4.3     |  25     |  0.99     Ca    8.3        24 Jun 2019 07:35  Ca    8.3        23 Jun 2019 08:20  Ca    8.1        22 Jun 2019 08:54  Mg     2.1       22 Jun 2019 08:54    TPro  5.8    /  Alb  2.2    /  TBili  0.4    /  DBili  x      /  AST  46     /  ALT  32     /  AlkPhos  46     24 Jun 2019 07:35  TPro  6.1    /  Alb  2.5    /  TBili  0.5    /  DBili  x      /  AST  41     /  ALT  36     /  AlkPhos  61     23 Jun 2019 08:20    < from: Xray Chest 2 Views PA/Lat (06.21.19 @ 08:58) >    EXAM:  XR CHEST PA LAT 2V                          PROCEDURE DATE:  06/21/2019      INTERPRETATION:  Interval imaging study.    PA lateral. Prior 6/20/2019.    Impression: Very low lung volumes. No change in left basal retrocardiac   infiltrate/atelectasis small bilateral pleural effusions. Remainder study   also unchanged. No new abnormality.    LEORA MOORE M.D., ATTENDING RADIOLOGIST  This document has been electronically signed. Jun 21 2019 11:09AM     < end of copied text >    < from: 12 Lead ECG (06.20.19 @ 15:29) >    Ventricular Rate 106 BPM    Atrial Rate 106 BPM    P-R Interval 184 ms    QRS Duration 108 ms    Q-T Interval 342 ms    QTC Calculation(Bezet) 454 ms    P Axis 41 degrees    R Axis -58 degrees    T Axis 74 degrees    Diagnosis Line Sinus tachycardia  Left anterior fascicular block  Abnormal ECG  Confirmed by DEEPAK PRASAD ([id number]) on 6/21/2019 8:18:12 AM    < end of copied text > [facility] -- Nicole Mayer, Zachery Garcia Pannella, Patel, Savella  Office # [phone]    Follow Up: post op Eval, HTN    Subjective/Observations: Awake and alert, nasal cannula in use but not in any discomfort.  c/o abdominal tenderness, not passing gas as yet.  Denies nausea or vomiting.  Denies any respiratory or cardiac discomfort    REVIEW OF SYSTEMS: All other review of systems is negative unless indicated above  PAST MEDICAL & SURGICAL HISTORY:  CAD (coronary artery disease): s/p CABG, 1995  Anginal pain  HTN (hypertension)  Enlarged prostate  H/O inguinal hernia repair: bilateral  S/P TURP: x2    MEDICATIONS  (STANDING):  atorvastatin 80 milliGRAM(s) Oral at bedtime  cefoTEtan  IVPB 2 Gram(s) IV Intermittent every 12 hours  docusate sodium 100 milliGRAM(s) Oral three times a day  enoxaparin Injectable 40 milliGRAM(s) SubCutaneous daily  finasteride 5 milliGRAM(s) Oral daily  lactated ringers. 1000 milliLiter(s) (125 mL/Hr) IV Continuous <Continuous>  metoprolol succinate ER 50 milliGRAM(s) Oral daily  NIFEdipine XL 60 milliGRAM(s) Oral daily  pantoprazole  Injectable 40 milliGRAM(s) IV Push daily  tamsulosin 0.4 milliGRAM(s) Oral at bedtime    MEDICATIONS  (PRN):  acetaminophen   Tablet .. 650 milliGRAM(s) Oral every 6 hours PRN Temp greater or equal to 38.5C (101.3F), Mild Pain (1 - 3)  HYDROmorphone  Injectable 0.5 milliGRAM(s) IV Push every 3 hours PRN Severe Pain (7 - 10)  oxyCODONE    IR 5 milliGRAM(s) Oral every 4 hours PRN Moderate Pain (4 - 6)    Allergies    No Known Allergies    Intolerances    Vital Signs Last 24 Hrs  T(C): 36.9 (24 Jun 2019 05:01), Max: 36.9 (24 Jun 2019 05:01)  T(F): 98.4 (24 Jun 2019 05:01), Max: 98.4 (24 Jun 2019 05:01)  HR: 99 (24 Jun 2019 05:01) (94 - 102)  BP: 118/67 (24 Jun 2019 05:01) (102/62 - 146/86)  BP(mean): --  RR: 17 (24 Jun 2019 05:01) (17 - 17)  SpO2: 94% (24 Jun 2019 05:01) (93% - 94%)  I&O's Summary    23 Jun 2019 07:01  -  24 Jun 2019 07:00  --------------------------------------------------------  IN: 2940 mL / OUT: 800 mL / NET: 2140 mL     PHYSICAL EXAM:  TELE: Not on tele  Constitutional: NAD, awake    HEENT: Moist Mucous Membranes, Anicteric  Pulmonary: Decreased breath sounds b/l. No rales, crackles or wheeze appreciated.   Cardiovascular: Regular, S1 and S2, + murmurs, rubs, gallops or clicks  Gastrointestinal: Bowel Sounds present, soft, nontender.   Lymph: No peripheral edema. No lymphadenopathy.  Skin: No visible rashes or ulcers.  Abdominal incision with staples intact  Psych:  Mood & affect appropriate  LABS: All Labs Reviewed:                        12.2   7.40  )-----------( 338      ( 24 Jun 2019 07:35 )             35.2                         13.8   9.49  )-----------( 314      ( 23 Jun 2019 08:20 )             40.4                         12.5   9.76  )-----------( 293      ( 22 Jun 2019 08:54 )             36.6     24 Jun 2019 07:35    134    |  95     |  24     ----------------------------<  91     3.7     |  30     |  1.40   23 Jun 2019 08:20    131    |  95     |  20     ----------------------------<  71     3.7     |  27     |  1.20   22 Jun 2019 08:54    136    |  100    |  27     ----------------------------<  105    4.3     |  25     |  0.99     Ca    8.3        24 Jun 2019 07:35  Ca    8.3        23 Jun 2019 08:20  Ca    8.1        22 Jun 2019 08:54  Mg     2.1       22 Jun 2019 08:54    TPro  5.8    /  Alb  2.2    /  TBili  0.4    /  DBili  x      /  AST  46     /  ALT  32     /  AlkPhos  46     24 Jun 2019 07:35  TPro  6.1    /  Alb  2.5    /  TBili  0.5    /  DBili  x      /  AST  41     /  ALT  36     /  AlkPhos  61     23 Jun 2019 08:20    < from: Xray Chest 2 Views PA/Lat (06.21.19 @ 08:58) >    EXAM:  XR CHEST PA LAT 2V                          PROCEDURE DATE:  06/21/2019      INTERPRETATION:  Interval imaging study.    PA lateral. Prior 6/20/2019.    Impression: Very low lung volumes. No change in left basal retrocardiac   infiltrate/atelectasis small bilateral pleural effusions. Remainder study   also unchanged. No new abnormality.    LEORA MOORE M.D., ATTENDING RADIOLOGIST  This document has been electronically signed. Jun 21 2019 11:09AM     < end of copied text >    < from: 12 Lead ECG (06.20.19 @ 15:29) >    Ventricular Rate 106 BPM    Atrial Rate 106 BPM    P-R Interval 184 ms    QRS Duration 108 ms    Q-T Interval 342 ms    QTC Calculation(Bezet) 454 ms    P Axis 41 degrees    R Axis -58 degrees    T Axis 74 degrees    Diagnosis Line Sinus tachycardia  Left anterior fascicular block  Abnormal ECG  Confirmed by DEEPAK PRASAD ([id number]) on 6/21/2019 8:18:12 AM    < end of copied text >

## 2019-06-24 NOTE — DISCHARGE NOTE PROVIDER - HOSPITAL COURSE
89M w/pmh of CAD s/p CABG (1995), b/l inguinal hernia repairs, enlarged prostate s/p TURP x2, HTN and HLD presenting with abdominal pain for 4 days. 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 Pt was able to tolerate diet well as advanced by surgery and instructed to have close follow up with general surgeon Dr. Tse. Pt also was note to having some urinary discomfort and was seen by urology, his symptoms resolved and was instructed outpatient follow up with Dr Cruz primary outpatient urologist. Pt was seen by PT and recommended home care PT.  Pt is now stable for discharge with close follow up for further care and maangement.             Time spent: 65 minutes

## 2019-06-24 NOTE — PROGRESS NOTE ADULT - ASSESSMENT
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  - Tolerated procedure well   - Can hold aspirin until cleared by surg  - Pain control    CAD   - Stable  - Can hold aspirin until cleared by surg  - CW BB  - Unable to start ACEI/ARB due to MARIO.  Will plan to start when able  - CW Statin  - Monitor and replete lytes as needed    HTN  - BP stable and controlled   - Continue BB/ nifedipine  - Monitor routine hemodynamics.     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  - Tolerated procedure well   - Can hold aspirin until cleared by surg  - Pain control    CAD   - Stable  - Can hold aspirin until cleared by surg  - CW BB  - Unable to start ACEI/ARB due to MARIO.  Will plan to start when able  - Please continue to maintain strict I/Os, monitor daily weights, Cr, and K.   - CW Statin  - Monitor and replete lytes as needed    HTN  - BP stable and controlled   - Continue BB/ nifedipine  - Monitor routine hemodynamics.     DVT ppx  - Per Primary    - To follow closely with you    Kimberlyn Simon [facility]  Cardiology   Spectra #[phone]

## 2019-06-24 NOTE — PROGRESS NOTE ADULT - ASSESSMENT
89M w/pmh of CAD s/p CABG (1995), b/l inguinal hernia repairs, enlarged prostate s/p TURP x2, HTN and HLD presenting with abdominal pain for 4 days. 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

## 2019-06-24 NOTE — DISCHARGE NOTE PROVIDER - CARE PROVIDERS DIRECT ADDRESSES
[email or web address]

## 2019-06-24 NOTE — PHYSICAL THERAPY INITIAL EVALUATION ADULT - ADDITIONAL COMMENTS
Pt lives in a duplex condo ( + steps) with his spouse. Pt stating he can stay on main floor and does not need to negotiate steps. Pt was an independent ambulator without device, independent with ADLs. + driving locally

## 2019-06-24 NOTE — DISCHARGE NOTE PROVIDER - PROVIDER TOKENS
PROVIDER:[TOKEN:[[id number]],FOLLOWUP:[1 week]],PROVIDER:[TOKEN:[[id number]],FOLLOWUP:[1-3 days]] PROVIDER:[TOKEN:[[id number]],FOLLOWUP:[1 week]],PROVIDER:[TOKEN:[[id number]],FOLLOWUP:[1-3 days]],FREE:[LAST:[Angy],FIRST:[],PHONE:[(   )    -],FAX:[(   )    -],ADDRESS:[Your primary medical provider    Cardio/PMD-Dr. Steve Travis [phone]],FOLLOWUP:[1 week]]

## 2019-06-24 NOTE — DISCHARGE NOTE PROVIDER - CARE PROVIDER_API CALL
Martinez Cruz)  Urology  [address] [facility], [facility] [address] [place] [address]  Phone: [phone]  Fax: [phone]  Follow Up Time: 1 week    Renato Tse)  Surgery  [address] [place] [id number]  Phone: [phone]  Fax: [phone]  Follow Up Time: 1-3 days Martinez Cruz)  Urology  [address] [facility], [address] [place] [address]  Phone: [phone]  Fax: [phone]  Follow Up Time: 1 week    Renato Tse)  Surgery  [address] [place] [id number]  Phone: [phone]  Fax: [phone]  Follow Up Time: 1-3 days    Dr. Angy  Your primary medical provider    Cardio/PMD-Dr. Steve Travis [phone]  Phone: (   )    -  Fax: (   )    -  Follow Up Time: 1 week

## 2019-06-25 LAB
ANION GAP SERPL CALC-SCNC: 8 MMOL/L — SIGNIFICANT CHANGE UP (ref 5–17)
BUN SERPL-MCNC: 22 MG/DL — SIGNIFICANT CHANGE UP (ref 7–23)
CALCIUM SERPL-MCNC: 8.1 MG/DL — LOW (ref 8.5–10.1)
CHLORIDE SERPL-SCNC: 96 MMOL/L — SIGNIFICANT CHANGE UP (ref 96–108)
CO2 SERPL-SCNC: 30 MMOL/L — SIGNIFICANT CHANGE UP (ref 22–31)
CREAT SERPL-MCNC: 1.2 MG/DL — SIGNIFICANT CHANGE UP (ref 0.5–1.3)
GLUCOSE SERPL-MCNC: 102 MG/DL — HIGH (ref 70–99)
MAGNESIUM SERPL-MCNC: 2 MG/DL — SIGNIFICANT CHANGE UP (ref 1.6–2.6)
POTASSIUM SERPL-MCNC: 3.6 MMOL/L — SIGNIFICANT CHANGE UP (ref 3.5–5.3)
POTASSIUM SERPL-SCNC: 3.6 MMOL/L — SIGNIFICANT CHANGE UP (ref 3.5–5.3)
SODIUM SERPL-SCNC: 134 MMOL/L — LOW (ref 135–145)

## 2019-06-25 PROCEDURE — 99232 SBSQ HOSP IP/OBS MODERATE 35: CPT

## 2019-06-25 PROCEDURE — 71045 X-RAY EXAM CHEST 1 VIEW: CPT | Mod: 26

## 2019-06-25 RX ORDER — DEXTROSE MONOHYDRATE, SODIUM CHLORIDE, AND POTASSIUM CHLORIDE 50; .745; 4.5 G/1000ML; G/1000ML; G/1000ML
1000 INJECTION, SOLUTION INTRAVENOUS
Refills: 0 | Status: DISCONTINUED | OUTPATIENT
Start: 2019-06-25 | End: 2019-06-30

## 2019-06-25 RX ADMIN — Medication 100 MILLIGRAM(S): at 22:02

## 2019-06-25 RX ADMIN — Medication 100 GRAM(S): at 05:46

## 2019-06-25 RX ADMIN — ENOXAPARIN SODIUM 40 MILLIGRAM(S): 100 INJECTION SUBCUTANEOUS at 11:25

## 2019-06-25 RX ADMIN — FINASTERIDE 5 MILLIGRAM(S): 5 TABLET, FILM COATED ORAL at 11:25

## 2019-06-25 RX ADMIN — DEXTROSE MONOHYDRATE, SODIUM CHLORIDE, AND POTASSIUM CHLORIDE 75 MILLILITER(S): 50; .745; 4.5 INJECTION, SOLUTION INTRAVENOUS at 12:20

## 2019-06-25 RX ADMIN — SODIUM CHLORIDE 100 MILLILITER(S): 9 INJECTION, SOLUTION INTRAVENOUS at 00:51

## 2019-06-25 RX ADMIN — HYDROMORPHONE HYDROCHLORIDE 0.5 MILLIGRAM(S): 2 INJECTION INTRAMUSCULAR; INTRAVENOUS; SUBCUTANEOUS at 08:24

## 2019-06-25 RX ADMIN — Medication 100 GRAM(S): at 17:47

## 2019-06-25 RX ADMIN — TAMSULOSIN HYDROCHLORIDE 0.4 MILLIGRAM(S): 0.4 CAPSULE ORAL at 22:02

## 2019-06-25 RX ADMIN — HYDROMORPHONE HYDROCHLORIDE 0.5 MILLIGRAM(S): 2 INJECTION INTRAMUSCULAR; INTRAVENOUS; SUBCUTANEOUS at 00:51

## 2019-06-25 RX ADMIN — HYDROMORPHONE HYDROCHLORIDE 0.5 MILLIGRAM(S): 2 INJECTION INTRAMUSCULAR; INTRAVENOUS; SUBCUTANEOUS at 04:33

## 2019-06-25 RX ADMIN — Medication 100 MILLIGRAM(S): at 05:46

## 2019-06-25 RX ADMIN — ATORVASTATIN CALCIUM 80 MILLIGRAM(S): 80 TABLET, FILM COATED ORAL at 22:02

## 2019-06-25 RX ADMIN — PANTOPRAZOLE SODIUM 40 MILLIGRAM(S): 20 TABLET, DELAYED RELEASE ORAL at 11:25

## 2019-06-25 RX ADMIN — Medication 100 MILLIGRAM(S): at 13:31

## 2019-06-25 RX ADMIN — HYDROMORPHONE HYDROCHLORIDE 0.5 MILLIGRAM(S): 2 INJECTION INTRAMUSCULAR; INTRAVENOUS; SUBCUTANEOUS at 04:09

## 2019-06-25 NOTE — PROGRESS NOTE ADULT - ASSESSMENT
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  - Tolerated procedure well   - Can hold aspirin until cleared by surg  - Pain control    CAD   - Stable  - Can hold aspirin until cleared by surgery given CABG  - CW BB  - Unable to start ACEI/ARB due to MARIO.  Will plan to start when able.  Labs pending  - CW Statin  - Monitor and replete lytes as needed    HTN  - BP labile from 100's-150's.  Will tolerate for now.  Single elevation maybe reactive  - Continue BB/ nifedipine  - Monitor routine hemodynamics.     DVT ppx  - Per Primary    - To follow closely with you    Kimberlyn Simon [facility]  Cardiology   Spectra #[phone]

## 2019-06-25 NOTE — PROGRESS NOTE ADULT - ASSESSMENT
89M w/pmh of CAD s/p CABG (1995), b/l inguinal hernia repairs, enlarged prostate s/p TURP x2, HTN and HLD presenting with abdominal pain for 4 days. 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

## 2019-06-25 NOTE — PROGRESS NOTE ADULT - SUBJECTIVE AND OBJECTIVE BOX
SURGERY    89y    SUBJECTIVE:   Patient seen at bedside, no overnight events, no complaints noted and pain is controlled at this time.   Patient states he is tolerating his clears diet, admits to flatus and some midline abdominal pain  Patient denies any headaches, chest pain, shortness of breath, nausea, vomiting, fever, chills, weakness, dysuria  Patient A+Ox3 in NAD at time of visit.    OBJECTIVE:   T(C): 36.5 (06-25-19 @ 05:18), Max: 36.6 (06-24-19 @ 13:24)  HR: 96 (06-25-19 @ 05:18) (87 - 100)  BP: 109/64 (06-25-19 @ 05:18) (109/64 - 154/81)  RR: 18 (06-25-19 @ 05:18) (16 - 18)  SpO2: 97% (06-25-19 @ 05:18) (95% - 97%)- on 5 Liters NC  Wt(kg): --  CAPILLARY BLOOD GLUCOSE        I&O's Detail    24 Jun 2019 07:01  -  25 Jun 2019 07:00  --------------------------------------------------------  IN:  Total IN: 0 mL    OUT:    Indwelling Catheter - Urethral: 1050 mL  Total OUT: 1050 mL    Total NET: -1050 mL          Physical exam:  General: A+O x 3 in NAD  HEENT: PERRLA, EOM intact  Neck: trachea midline  Chest: diminished breath sounds bilaterally No rales, rhonchi wheezes noted bilaterally,   Abdomen: soft non distended, non tympanic, BS x 4, no guarding noted  Incision: intact, no erythema noted  Extremities: no edema noted, warm,  no calf tenderness     MEDICATIONS  (STANDING):  atorvastatin 80 milliGRAM(s) Oral at bedtime  cefoTEtan  IVPB 2 Gram(s) IV Intermittent every 12 hours  docusate sodium 100 milliGRAM(s) Oral three times a day  enoxaparin Injectable 40 milliGRAM(s) SubCutaneous daily  finasteride 5 milliGRAM(s) Oral daily  lactated ringers. 1000 milliLiter(s) (100 mL/Hr) IV Continuous <Continuous>  metoprolol succinate ER 50 milliGRAM(s) Oral daily  NIFEdipine XL 60 milliGRAM(s) Oral daily  pantoprazole  Injectable 40 milliGRAM(s) IV Push daily  tamsulosin 0.4 milliGRAM(s) Oral at bedtime    MEDICATIONS  (PRN):  acetaminophen   Tablet .. 650 milliGRAM(s) Oral every 6 hours PRN Temp greater or equal to 38.5C (101.3F), Mild Pain (1 - 3)  HYDROmorphone  Injectable 0.5 milliGRAM(s) IV Push every 3 hours PRN Severe Pain (7 - 10)  oxyCODONE    IR 5 milliGRAM(s) Oral every 4 hours PRN Moderate Pain (4 - 6)      LABS:                        12.2   7.40  )-----------( 338      ( 24 Jun 2019 07:35 )             35.2     06-25    134<L>  |  96  |  22  ----------------------------<  102<H>  3.6   |  30  |  1.20    Ca    8.1<L>      25 Jun 2019 08:14  Mg     2.0     06-25    TPro  5.8<L>  /  Alb  2.2<L>  /  TBili  0.4  /  DBili  x   /  AST  46<H>  /  ALT  32  /  AlkPhos  46  06-24          RADIOLOGY & ADDITIONAL STUDIES:    Assessment  Patient is a 89y old  Male 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 Patient with elevated O2 requirements with decreased breath sounds bilaterally but in no respiratory distress likely secondary to atelectasis      Plan  - DVT prophylaxis  - continue pain management regimen  - decrease IVF to 75cc/ hour  - continue clears and possibly advance to fulls   - OOB / PT  - remove wade catheter, void check  - room air sat, CXR today, incentive spirometer      Surgical Team Contact Information

## 2019-06-25 NOTE — PROGRESS NOTE ADULT - SUBJECTIVE AND OBJECTIVE BOX
[facility] -- Nicole Mayer, Radha, Zachery, Elmer Jane Savella  Office # [phone]    Follow Up:  post op Eval, HTN    Subjective/Observations: Seen and evaluated, comfortable on RA.  No post op complaints except some tenderness on palpation.  No respiratory or cardiac discomfort    REVIEW OF SYSTEMS: All other review of systems is negative unless indicated above  PAST MEDICAL & SURGICAL HISTORY:  CAD (coronary artery disease): s/p CABG, 1995  Anginal pain  HTN (hypertension)  Enlarged prostate  H/O inguinal hernia repair: bilateral  S/P TURP: x2    MEDICATIONS  (STANDING):  atorvastatin 80 milliGRAM(s) Oral at bedtime  cefoTEtan  IVPB 2 Gram(s) IV Intermittent every 12 hours  docusate sodium 100 milliGRAM(s) Oral three times a day  enoxaparin Injectable 40 milliGRAM(s) SubCutaneous daily  finasteride 5 milliGRAM(s) Oral daily  lactated ringers. 1000 milliLiter(s) (100 mL/Hr) IV Continuous <Continuous>  metoprolol succinate ER 50 milliGRAM(s) Oral daily  NIFEdipine XL 60 milliGRAM(s) Oral daily  pantoprazole  Injectable 40 milliGRAM(s) IV Push daily  tamsulosin 0.4 milliGRAM(s) Oral at bedtime    MEDICATIONS  (PRN):  acetaminophen   Tablet .. 650 milliGRAM(s) Oral every 6 hours PRN Temp greater or equal to 38.5C (101.3F), Mild Pain (1 - 3)  HYDROmorphone  Injectable 0.5 milliGRAM(s) IV Push every 3 hours PRN Severe Pain (7 - 10)  oxyCODONE    IR 5 milliGRAM(s) Oral every 4 hours PRN Moderate Pain (4 - 6)    Allergies    No Known Allergies    Intolerances    Vital Signs Last 24 Hrs  T(C): 36.5 (25 Jun 2019 05:18), Max: 36.6 (24 Jun 2019 13:24)  T(F): 97.7 (25 Jun 2019 05:18), Max: 97.9 (24 Jun 2019 20:34)  HR: 96 (25 Jun 2019 05:18) (87 - 100)  BP: 109/64 (25 Jun 2019 05:18) (109/64 - 154/81)  BP(mean): --  RR: 18 (25 Jun 2019 05:18) (16 - 18)  SpO2: 97% (25 Jun 2019 05:18) (95% - 97%)  I&O's Summary    24 Jun 2019 07:01  -  25 Jun 2019 07:00  --------------------------------------------------------  IN: 0 mL / OUT: 1050 mL / NET: -1050 mL    PHYSICAL EXAM:  TELE: Not on tele  Constitutional: NAD, awake    HEENT: Moist Mucous Membranes, Anicteric  Pulmonary: Decreased breath sounds b/l. No rales, crackles or wheeze appreciated.   Cardiovascular: Regular, S1 and S2, + murmurs, rubs, gallops or clicks  Gastrointestinal: Bowel Sounds present, soft, nontender.   Lymph: No peripheral edema. No lymphadenopathy.  Skin: No visible rashes or ulcers.  Abdominal incision with staples intact  Psych:  Mood & affect appropriate  LABS: All Labs Reviewed:                       12.2   7.40  )-----------( 338      ( 24 Jun 2019 07:35 )             35.2                         13.8   9.49  )-----------( 314      ( 23 Jun 2019 08:20 )             40.4                         12.5   9.76  )-----------( 293      ( 22 Jun 2019 08:54 )             36.6     24 Jun 2019 07:35    134    |  95     |  24     ----------------------------<  91     3.7     |  30     |  1.40   23 Jun 2019 08:20    131    |  95     |  20     ----------------------------<  71     3.7     |  27     |  1.20   22 Jun 2019 08:54    136    |  100    |  27     ----------------------------<  105    4.3     |  25     |  0.99     Ca    8.3        24 Jun 2019 07:35  Ca    8.3        23 Jun 2019 08:20  Ca    8.1        22 Jun 2019 08:54  Mg     2.1       22 Jun 2019 08:54    TPro  5.8    /  Alb  2.2    /  TBili  0.4    /  DBili  x      /  AST  46     /  ALT  32     /  AlkPhos  46     24 Jun 2019 07:35  TPro  6.1    /  Alb  2.5    /  TBili  0.5    /  DBili  x      /  AST  41     /  ALT  36     /  AlkPhos  61     23 Jun 2019 08:20    < from: Xray Chest 2 Views PA/Lat (06.21.19 @ 08:58) >    EXAM:  XR CHEST PA LAT 2V                          PROCEDURE DATE:  06/21/2019      INTERPRETATION:  Interval imaging study.    PA lateral. Prior 6/20/2019.    Impression: Very low lung volumes. No change in left basal retrocardiac   infiltrate/atelectasis small bilateral pleural effusions. Remainder study   also unchanged. No new abnormality.    LEORA MOORE M.D., ATTENDING RADIOLOGIST  This document has been electronically signed. Jun 21 2019 11:09AM     < end of copied text >    < from: 12 Lead ECG (06.20.19 @ 15:29) >    Ventricular Rate 106 BPM    Atrial Rate 106 BPM    P-R Interval 184 ms    QRS Duration 108 ms    Q-T Interval 342 ms    QTC Calculation(Bezet) 454 ms    P Axis 41 degrees    R Axis -58 degrees    T Axis 74 degrees    Diagnosis Line Sinus tachycardia  Left anterior fascicular block  Abnormal ECG  Confirmed by DEEPAK JANE ([id number]) on 6/21/2019 8:18:12 AM    < end of copied text >

## 2019-06-25 NOTE — PROGRESS NOTE ADULT - SUBJECTIVE AND OBJECTIVE BOX
Patient is a 89y old  Male who presents with a chief complaint of abdominal pain (25 Jun 2019 09:14)        HPI:  89M w/pmh of CAD s/p CABG (1995), b/l inguinal hernia repairs, enlarged prostate s/p TURP x2, HTN and HLD presenting with abdominal pain for 4 days. States that after dinner on Sunday, he had a sharp, periumbilical pain with associated nausea and decreased appetite. He thought that he had eaten something that upset his stomach. He takes Tylenol at night for prostate discomfort. Over the past few days, his abdominal pain has improved to a dull ache, 3/10 in severity, nonradiating located around umbilicus. He had constipation up until this morning when he had a large bm after a fleet enema. He is able to pass gas.  Denies fevers/chills, chest pain, palpitations, sob, dysuria, edema or weakness. Patient's last colonoscopy was 7 years ago, wnl.     In the ED, patient's vitals were: T98.5F, , /72, RR 14 and SpO2 95% on room air. Significant labs include: H/H 12.1/35.5, Na 129, trops neg x1, pro-. UA wnl. Pt received Azithromycin and Ceftriaxone.   CT A/P: he cecum is dilated and folded on itself in the right upper quadrant, changed in location from prior CT. There is wall thickening involving the terminal ileum, cecum and proximal ascending colon. Appendix is distended and air-filled measuring up to 1.3 cm at the tip.   CXR: No change heart mediastinum. Asymmetric elevation right hemidiaphragm. There is left basilar retrocardiac atelectasis/infiltrate. Correlate for active infection. Small left pleural effusion. Distended bowel visualized upper abdomen.  EKG: sinus tachycardia, , no ST elevation/depression (20 Jun 2019 20:10)      SUBJECTIVE & OBJECTIVE: Pt seen and examined at bedside, no acute complaints , tolerating clears     PHYSICAL EXAM:  T(C): 36.5 (06-25-19 @ 05:18), Max: 36.6 (06-24-19 @ 13:24)  HR: 96 (06-25-19 @ 05:18) (87 - 100)  BP: 109/64 (06-25-19 @ 05:18) (109/64 - 154/81)  RR: 18 (06-25-19 @ 05:18) (16 - 18)  SpO2: 93% (06-25-19 @ 09:39) (93% - 97%)  Wt(kg): --   GENERAL: NAD, well-groomed, well-developed  HEAD:  Atraumatic, Normocephalic  EYES: EOMI, PERRLA, conjunctiva and sclera clear  ENMT: Moist mucous membranes  NECK: Supple, No JVD  NERVOUS SYSTEM:  Alert & Oriented X3, Motor Strength 5/5 B/L upper and lower extremities; DTRs 2+ intact and symmetric  CHEST/LUNG: Clear to auscultation bilaterally; No rales, rhonchi, wheezing, or rubs  HEART: Regular rate and rhythm; No murmurs, rubs, or gallops  ABDOMEN: Soft, Nontender, Nondistended; Bowel sounds present  EXTREMITIES:  2+ Peripheral Pulses, No clubbing, cyanosis, or edema        MEDICATIONS  (STANDING):  atorvastatin 80 milliGRAM(s) Oral at bedtime  cefoTEtan  IVPB 2 Gram(s) IV Intermittent every 12 hours  docusate sodium 100 milliGRAM(s) Oral three times a day  enoxaparin Injectable 40 milliGRAM(s) SubCutaneous daily  finasteride 5 milliGRAM(s) Oral daily  metoprolol succinate ER 50 milliGRAM(s) Oral daily  NIFEdipine XL 60 milliGRAM(s) Oral daily  pantoprazole  Injectable 40 milliGRAM(s) IV Push daily  sodium chloride 0.9% with potassium chloride 20 mEq/L 1000 milliLiter(s) (75 mL/Hr) IV Continuous <Continuous>  tamsulosin 0.4 milliGRAM(s) Oral at bedtime    MEDICATIONS  (PRN):  acetaminophen   Tablet .. 650 milliGRAM(s) Oral every 6 hours PRN Temp greater or equal to 38.5C (101.3F), Mild Pain (1 - 3)  HYDROmorphone  Injectable 0.5 milliGRAM(s) IV Push every 3 hours PRN Severe Pain (7 - 10)  oxyCODONE    IR 5 milliGRAM(s) Oral every 4 hours PRN Moderate Pain (4 - 6)      LABS:                        12.2   7.40  )-----------( 338      ( 24 Jun 2019 07:35 )             35.2     06-25    134<L>  |  96  |  22  ----------------------------<  102<H>  3.6   |  30  |  1.20    Ca    8.1<L>      25 Jun 2019 08:14  Mg     2.0     06-25    TPro  5.8<L>  /  Alb  2.2<L>  /  TBili  0.4  /  DBili  x   /  AST  46<H>  /  ALT  32  /  AlkPhos  46  06-24        Magnesium, Serum: 2.0 mg/dL (06-25 @ 08:14)    CAPILLARY BLOOD GLUCOSE          CAPILLARY BLOOD GLUCOSE        CAPILLARY BLOOD GLUCOSE                RECENT CULTURES:      RADIOLOGY & ADDITIONAL TESTS:                        DVT/GI ppx  Discussed with pt @ bedside

## 2019-06-26 DIAGNOSIS — R33.9 RETENTION OF URINE, UNSPECIFIED: ICD-10-CM

## 2019-06-26 LAB
ALBUMIN SERPL ELPH-MCNC: 2.2 G/DL — LOW (ref 3.3–5)
ALP SERPL-CCNC: 52 U/L — SIGNIFICANT CHANGE UP (ref 40–120)
ALT FLD-CCNC: 36 U/L — SIGNIFICANT CHANGE UP (ref 12–78)
ANION GAP SERPL CALC-SCNC: 7 MMOL/L — SIGNIFICANT CHANGE UP (ref 5–17)
APPEARANCE UR: ABNORMAL
AST SERPL-CCNC: 44 U/L — HIGH (ref 15–37)
BILIRUB SERPL-MCNC: 0.3 MG/DL — SIGNIFICANT CHANGE UP (ref 0.2–1.2)
BILIRUB UR-MCNC: NEGATIVE — SIGNIFICANT CHANGE UP
BUN SERPL-MCNC: 17 MG/DL — SIGNIFICANT CHANGE UP (ref 7–23)
CALCIUM SERPL-MCNC: 7.8 MG/DL — LOW (ref 8.5–10.1)
CHLORIDE SERPL-SCNC: 99 MMOL/L — SIGNIFICANT CHANGE UP (ref 96–108)
CO2 SERPL-SCNC: 29 MMOL/L — SIGNIFICANT CHANGE UP (ref 22–31)
COLOR SPEC: YELLOW — SIGNIFICANT CHANGE UP
CREAT SERPL-MCNC: 1.2 MG/DL — SIGNIFICANT CHANGE UP (ref 0.5–1.3)
DIFF PNL FLD: ABNORMAL
GLUCOSE SERPL-MCNC: 121 MG/DL — HIGH (ref 70–99)
GLUCOSE UR QL: NEGATIVE — SIGNIFICANT CHANGE UP
HCT VFR BLD CALC: 35.5 % — LOW (ref 39–50)
HGB BLD-MCNC: 12.3 G/DL — LOW (ref 13–17)
KETONES UR-MCNC: NEGATIVE — SIGNIFICANT CHANGE UP
LEUKOCYTE ESTERASE UR-ACNC: NEGATIVE — SIGNIFICANT CHANGE UP
MAGNESIUM SERPL-MCNC: 2 MG/DL — SIGNIFICANT CHANGE UP (ref 1.6–2.6)
MCHC RBC-ENTMCNC: 31.5 PG — SIGNIFICANT CHANGE UP (ref 27–34)
MCHC RBC-ENTMCNC: 34.6 GM/DL — SIGNIFICANT CHANGE UP (ref 32–36)
MCV RBC AUTO: 91 FL — SIGNIFICANT CHANGE UP (ref 80–100)
NITRITE UR-MCNC: NEGATIVE — SIGNIFICANT CHANGE UP
NRBC # BLD: 0 /100 WBCS — SIGNIFICANT CHANGE UP (ref 0–0)
PH UR: 5 — SIGNIFICANT CHANGE UP (ref 5–8)
PLATELET # BLD AUTO: 385 K/UL — SIGNIFICANT CHANGE UP (ref 150–400)
POTASSIUM SERPL-MCNC: 3.5 MMOL/L — SIGNIFICANT CHANGE UP (ref 3.5–5.3)
POTASSIUM SERPL-SCNC: 3.5 MMOL/L — SIGNIFICANT CHANGE UP (ref 3.5–5.3)
PROT SERPL-MCNC: 5.4 G/DL — LOW (ref 6–8.3)
PROT UR-MCNC: 25 MG/DL
RBC # BLD: 3.9 M/UL — LOW (ref 4.2–5.8)
RBC # FLD: 13.5 % — SIGNIFICANT CHANGE UP (ref 10.3–14.5)
SODIUM SERPL-SCNC: 135 MMOL/L — SIGNIFICANT CHANGE UP (ref 135–145)
SP GR SPEC: 1.01 — SIGNIFICANT CHANGE UP (ref 1.01–1.02)
SURGICAL PATHOLOGY STUDY: SIGNIFICANT CHANGE UP
UROBILINOGEN FLD QL: NEGATIVE — SIGNIFICANT CHANGE UP
WBC # BLD: 6.17 K/UL — SIGNIFICANT CHANGE UP (ref 3.8–10.5)
WBC # FLD AUTO: 6.17 K/UL — SIGNIFICANT CHANGE UP (ref 3.8–10.5)

## 2019-06-26 PROCEDURE — 99232 SBSQ HOSP IP/OBS MODERATE 35: CPT

## 2019-06-26 PROCEDURE — 99233 SBSQ HOSP IP/OBS HIGH 50: CPT

## 2019-06-26 RX ORDER — TAMSULOSIN HYDROCHLORIDE 0.4 MG/1
0.4 CAPSULE ORAL
Refills: 0 | Status: DISCONTINUED | OUTPATIENT
Start: 2019-06-26 | End: 2019-06-30

## 2019-06-26 RX ORDER — PHENAZOPYRIDINE HCL 100 MG
100 TABLET ORAL EVERY 8 HOURS
Refills: 0 | Status: DISCONTINUED | OUTPATIENT
Start: 2019-06-26 | End: 2019-06-29

## 2019-06-26 RX ORDER — ACETAMINOPHEN 500 MG
650 TABLET ORAL ONCE
Refills: 0 | Status: COMPLETED | OUTPATIENT
Start: 2019-06-26 | End: 2019-06-26

## 2019-06-26 RX ORDER — NYSTATIN CREAM 100000 [USP'U]/G
1 CREAM TOPICAL THREE TIMES A DAY
Refills: 0 | Status: DISCONTINUED | OUTPATIENT
Start: 2019-06-26 | End: 2019-06-30

## 2019-06-26 RX ORDER — POTASSIUM CHLORIDE 20 MEQ
40 PACKET (EA) ORAL ONCE
Refills: 0 | Status: COMPLETED | OUTPATIENT
Start: 2019-06-26 | End: 2019-06-26

## 2019-06-26 RX ORDER — SIMETHICONE 80 MG/1
80 TABLET, CHEWABLE ORAL THREE TIMES A DAY
Refills: 0 | Status: DISCONTINUED | OUTPATIENT
Start: 2019-06-26 | End: 2019-06-30

## 2019-06-26 RX ADMIN — FINASTERIDE 5 MILLIGRAM(S): 5 TABLET, FILM COATED ORAL at 13:09

## 2019-06-26 RX ADMIN — PANTOPRAZOLE SODIUM 40 MILLIGRAM(S): 20 TABLET, DELAYED RELEASE ORAL at 13:09

## 2019-06-26 RX ADMIN — Medication 100 MILLIGRAM(S): at 06:00

## 2019-06-26 RX ADMIN — DEXTROSE MONOHYDRATE, SODIUM CHLORIDE, AND POTASSIUM CHLORIDE 75 MILLILITER(S): 50; .745; 4.5 INJECTION, SOLUTION INTRAVENOUS at 21:41

## 2019-06-26 RX ADMIN — Medication 100 GRAM(S): at 17:41

## 2019-06-26 RX ADMIN — NYSTATIN CREAM 1 APPLICATION(S): 100000 CREAM TOPICAL at 21:41

## 2019-06-26 RX ADMIN — Medication 100 MILLIGRAM(S): at 13:09

## 2019-06-26 RX ADMIN — ENOXAPARIN SODIUM 40 MILLIGRAM(S): 100 INJECTION SUBCUTANEOUS at 13:10

## 2019-06-26 RX ADMIN — DEXTROSE MONOHYDRATE, SODIUM CHLORIDE, AND POTASSIUM CHLORIDE 75 MILLILITER(S): 50; .745; 4.5 INJECTION, SOLUTION INTRAVENOUS at 02:48

## 2019-06-26 RX ADMIN — NYSTATIN CREAM 1 APPLICATION(S): 100000 CREAM TOPICAL at 06:04

## 2019-06-26 RX ADMIN — Medication 50 MILLIGRAM(S): at 06:00

## 2019-06-26 RX ADMIN — Medication 60 MILLIGRAM(S): at 06:00

## 2019-06-26 RX ADMIN — TAMSULOSIN HYDROCHLORIDE 0.4 MILLIGRAM(S): 0.4 CAPSULE ORAL at 17:54

## 2019-06-26 RX ADMIN — Medication 100 MILLIGRAM(S): at 21:41

## 2019-06-26 RX ADMIN — Medication 40 MILLIEQUIVALENT(S): at 13:09

## 2019-06-26 RX ADMIN — Medication 650 MILLIGRAM(S): at 06:29

## 2019-06-26 RX ADMIN — SIMETHICONE 80 MILLIGRAM(S): 80 TABLET, CHEWABLE ORAL at 21:41

## 2019-06-26 RX ADMIN — ATORVASTATIN CALCIUM 80 MILLIGRAM(S): 80 TABLET, FILM COATED ORAL at 21:41

## 2019-06-26 RX ADMIN — Medication 100 GRAM(S): at 06:03

## 2019-06-26 RX ADMIN — Medication 100 MILLIGRAM(S): at 14:42

## 2019-06-26 NOTE — CHART NOTE - NSCHARTNOTEFT_GEN_A_CORE
Called by RN for pt c/o pain in lower abd. Pt seen and examined at bedside. Pt denies suprapubic pain, urethral pain/discomfort, or pain while urinating. Pt admits pain in inguinal region, extending to scrotum. On physical exam, suprapubic region NTTP, inguinal region with mild scaling and erythema in scrotal region. Will obtain UA and UCx at this time, given pt with low bladder volumes per charting, and will give pain regimen as ordered. RN to call with any changes, will continue to monitor.    Vital Signs Last 24 Hrs  T(C): 36.6 (25 Jun 2019 20:24), Max: 36.6 (25 Jun 2019 20:24)  T(F): 97.9 (25 Jun 2019 20:24), Max: 97.9 (25 Jun 2019 20:24)  HR: 107 (25 Jun 2019 20:24) (93 - 107)  BP: 147/81 (25 Jun 2019 20:24) (109/64 - 147/81)  BP(mean): --  RR: 17 (25 Jun 2019 20:24) (17 - 18)  SpO2: 90% (25 Jun 2019 20:24) (90% - 104%)

## 2019-06-26 NOTE — PROGRESS NOTE ADULT - SUBJECTIVE AND OBJECTIVE BOX
[facility] -- Nicole Mayer, Radha, Zachery, Elmer Prasad Savella  Office # [phone]    Follow Up:  Preop Eval, HTN    Subjective/Observations: No c/o abdominal pain, nausea or vomiting.  Denies any respiratory or cardiac discomfort.  However, c/o dysuria and inability to urinate requiring straight cath overnight, s/p wade removal    REVIEW OF SYSTEMS: All other review of systems is negative unless indicated above  PAST MEDICAL & SURGICAL HISTORY:  CAD (coronary artery disease): s/p CABG, 1995  Anginal pain  HTN (hypertension)  Enlarged prostate  H/O inguinal hernia repair: bilateral  S/P TURP: x2    MEDICATIONS  (STANDING):  atorvastatin 80 milliGRAM(s) Oral at bedtime  cefoTEtan  IVPB 2 Gram(s) IV Intermittent every 12 hours  docusate sodium 100 milliGRAM(s) Oral three times a day  enoxaparin Injectable 40 milliGRAM(s) SubCutaneous daily  finasteride 5 milliGRAM(s) Oral daily  metoprolol succinate ER 50 milliGRAM(s) Oral daily  NIFEdipine XL 60 milliGRAM(s) Oral daily  nystatin Powder 1 Application(s) Topical three times a day  pantoprazole  Injectable 40 milliGRAM(s) IV Push daily  phenazopyridine 100 milliGRAM(s) Oral every 8 hours  potassium chloride    Tablet ER 40 milliEquivalent(s) Oral once  sodium chloride 0.9% with potassium chloride 20 mEq/L 1000 milliLiter(s) (75 mL/Hr) IV Continuous <Continuous>  tamsulosin 0.4 milliGRAM(s) Oral at bedtime    MEDICATIONS  (PRN):  acetaminophen   Tablet .. 650 milliGRAM(s) Oral every 6 hours PRN Temp greater or equal to 38.5C (101.3F), Mild Pain (1 - 3)  HYDROmorphone  Injectable 0.5 milliGRAM(s) IV Push every 3 hours PRN Severe Pain (7 - 10)  oxyCODONE    IR 5 milliGRAM(s) Oral every 4 hours PRN Moderate Pain (4 - 6)    Allergies    No Known Allergies    Intolerances    Vital Signs Last 24 Hrs  T(C): 36.6 (26 Jun 2019 05:27), Max: 36.6 (25 Jun 2019 20:24)  T(F): 97.9 (26 Jun 2019 05:27), Max: 97.9 (25 Jun 2019 20:24)  HR: 101 (26 Jun 2019 05:27) (93 - 107)  BP: 149/71 (26 Jun 2019 05:27) (135/88 - 149/71)  BP(mean): --  RR: 18 (26 Jun 2019 05:27) (17 - 18)  SpO2: 91% (26 Jun 2019 05:27) (90% - 104%)  I&O's Summary    25 Jun 2019 07:01  -  26 Jun 2019 07:00  --------------------------------------------------------  IN: 0 mL / OUT: 250 mL / NET: -250 mL    PHYSICAL EXAM:  TELE: Not on tele  Constitutional: NAD, awake    HEENT: Moist Mucous Membranes, Anicteric  Pulmonary: Decreased breath sounds b/l. No rales, crackles or wheeze appreciated.   Cardiovascular: Regular, S1 and S2, + murmurs, rubs, gallops or clicks  Gastrointestinal: Bowel Sounds present, soft, nontender.   Lymph: No peripheral edema. No lymphadenopathy.  Skin: No visible rashes or ulcers.  Abdominal incision with staples intact  Psych:  Mood & affect appropriate  LABS: All Labs Reviewed:                       12.3   6.17  )-----------( 385      ( 26 Jun 2019 07:48 )             35.5                         12.2   7.40  )-----------( 338      ( 24 Jun 2019 07:35 )             35.2     26 Jun 2019 07:48    135    |  99     |  17     ----------------------------<  121    3.5     |  29     |  1.20   25 Jun 2019 08:14    134    |  96     |  22     ----------------------------<  102    3.6     |  30     |  1.20   24 Jun 2019 07:35    134    |  95     |  24     ----------------------------<  91     3.7     |  30     |  1.40     Ca    7.8        26 Jun 2019 07:48  Ca    8.1        25 Jun 2019 08:14  Ca    8.3        24 Jun 2019 07:35  Mg     2.0       26 Jun 2019 07:48  Mg     2.0       25 Jun 2019 08:14    TPro  5.4    /  Alb  2.2    /  TBili  0.3    /  DBili  x      /  AST  44     /  ALT  36     /  AlkPhos  52     26 Jun 2019 07:48  TPro  5.8    /  Alb  2.2    /  TBili  0.4    /  DBili  x      /  AST  46     /  ALT  32     /  AlkPhos  46     24 Jun 2019 07:35    < from: Xray Chest 2 Views PA/Lat (06.21.19 @ 08:58) >    EXAM:  XR CHEST PA LAT 2V                          PROCEDURE DATE:  06/21/2019      INTERPRETATION:  Interval imaging study.    PA lateral. Prior 6/20/2019.    Impression: Very low lung volumes. No change in left basal retrocardiac   infiltrate/atelectasis small bilateral pleural effusions. Remainder study   also unchanged. No new abnormality.    LEORA MOORE M.D., ATTENDING RADIOLOGIST  This document has been electronically signed. Jun 21 2019 11:09AM     < end of copied text >    < from: 12 Lead ECG (06.20.19 @ 15:29) >    Ventricular Rate 106 BPM    Atrial Rate 106 BPM    P-R Interval 184 ms    QRS Duration 108 ms    Q-T Interval 342 ms    QTC Calculation(Bezet) 454 ms    P Axis 41 degrees    R Axis -58 degrees    T Axis 74 degrees    Diagnosis Line Sinus tachycardia  Left anterior fascicular block  Abnormal ECG  Confirmed by DEEPAK PRASAD ([id number]) on 6/21/2019 8:18:12 AM    < end of copied text >

## 2019-06-26 NOTE — PROGRESS NOTE ADULT - ASSESSMENT
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  - Tolerated procedure well   - Can hold aspirin until cleared by surg  - Pain control  - He had urinary retention overnight post wade removal.  Recommend Uro consult    CAD   - Stable  - Can hold aspirin until cleared by surgery, but please resume ASQAP given CABG  - Continue BB  - Unable to start ACEI/ARB due to MARIO.  Now resolved.  Will plan to start in am if creatinine remains normal  - Continue Statin  - Monitor and replete lytes as needed    HTN  - SBP now stable at 130-140.    - Continue BB/ nifedipine  - Monitor routine hemodynamics.     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  - Tolerated procedure well   - Can hold aspirin until cleared by surg  - Pain control  - He had urinary retention overnight post wade removal.  Recommend Uro consult    CAD   - Stable  - Can hold aspirin until cleared by surgery, but please resume when able given CABG  - Continue BB  - Unable to start ACEI/ARB due to MARIO.  Now resolved.  Will plan to start in am if creatinine remains normal  - Continue Statin  - Monitor and replete lytes as needed    HTN  - SBP now stable at 130-140.    - Continue BB/ nifedipine  - Monitor routine hemodynamics.     DVT ppx  - Per Primary    - To follow closely with you    Kimberlyn Simon [facility]  Cardiology   Spectra #[phone]

## 2019-06-26 NOTE — PROGRESS NOTE ADULT - ASSESSMENT
89M w/pmh of CAD s/p CABG (1995), b/l inguinal hernia repairs, enlarged prostate s/p TURP x2, HTN and HLD presenting with abdominal pain for 4 days. Admitted to [facility] with cecal volvulus,  found to have Ischemic and obstructed right cecum/ colon; s/p Right hemicolectomy with removal of terminal, ileum and ileocolostomy POD#5

## 2019-06-26 NOTE — PROGRESS NOTE ADULT - SUBJECTIVE AND OBJECTIVE BOX
Patient is a 89y old  Male who presents with a chief complaint of abdominal pain (2019 11:16)      INTERVAL HPI: Pt seen and examined. States he still has some difficulty urinating and some dysuria with urine, states has had utis in past.     OVERNIGHT EVENTS: none noted  T(F): 97.9 (19 @ 05:27), Max: 97.9 (19 @ 20:24)  HR: 101 (19 @ 05:27) (93 - 107)  BP: 149/71 (19 @ 05:27) (135/88 - 149/71)  RR: 18 (19 @ 05:27) (17 - 18)  SpO2: 91% (19 @ 05:27) (90% - 104%)  I&O's Summary    2019 07:01  -  2019 07:00  --------------------------------------------------------  IN: 0 mL / OUT: 250 mL / NET: -250 mL        REVIEW OF SYSTEMS:  CONSTITUTIONAL: No fever, weight loss, or fatigue  RESPIRATORY: No cough, wheezing, chills or hemoptysis; No shortness of breath  CARDIOVASCULAR: No chest pain, palpitations, dizziness, or leg swelling  GASTROINTESTINAL: No abdominal or epigastric pain. No nausea, vomiting, or hematemesis; No diarrhea or constipation. No melena or hematochezia.  GENITOURINARY: + retention and dysuria, No frequency, hematuria, or incontinence  NEUROLOGICAL: No headaches, memory loss, loss of strength, numbness, or tremors  SKIN: No itching, burning, rashes, or lesions   MUSCULOSKELETAL: No joint pain or swelling; No muscle, back, or extremity pain  PSYCHIATRIC: No depression, anxiety, mood swings, or difficulty sleeping      PHYSICAL EXAM:  GENERAL: NAD, well-groomed, well-developed, elder  NERVOUS SYSTEM:  Alert & Oriented X3, Good concentration; Motor Strength 4/5 B/L upper and lower extremities CHEST/LUNG: Clear to percussion bilaterally; No rales, rhonchi, wheezing, or rubs  HEART: Regular rate and rhythm; No murmurs, rubs, or gallops  ABDOMEN: Soft, Nontender, Nondistended; Bowel sounds present  EXTREMITIES:  2+ Peripheral Pulses, No clubbing, cyanosis, or edema  SKIN: No rashes or lesions except midline abd vertical surgical incision wound which is healing nicely, intact minimal erthem with healthy surrounding skin    LABS:                        12.3   6.17  )-----------( 385      ( 2019 07:48 )             35.5         135  |  99  |  17  ----------------------------<  121<H>  3.5   |  29  |  1.20    Ca    7.8<L>      2019 07:48  Mg     2.0         TPro  5.4<L>  /  Alb  2.2<L>  /  TBili  0.3  /  DBili  x   /  AST  44<H>  /  ALT  36  /  AlkPhos  52        Urinalysis Basic - ( 2019 06:46 )    Color: Yellow / Appearance: Slightly Turbid / S.015 / pH: x  Gluc: x / Ketone: Negative  / Bili: Negative / Urobili: Negative   Blood: x / Protein: 25 mg/dL / Nitrite: Negative   Leuk Esterase: Negative / RBC: 11-25 /HPF / WBC 0-2   Sq Epi: x / Non Sq Epi: Occasional / Bacteria: x      CAPILLARY BLOOD GLUCOSE                  MEDICATIONS  (STANDING):  atorvastatin 80 milliGRAM(s) Oral at bedtime  cefoTEtan  IVPB 2 Gram(s) IV Intermittent every 12 hours  docusate sodium 100 milliGRAM(s) Oral three times a day  enoxaparin Injectable 40 milliGRAM(s) SubCutaneous daily  finasteride 5 milliGRAM(s) Oral daily  metoprolol succinate ER 50 milliGRAM(s) Oral daily  NIFEdipine XL 60 milliGRAM(s) Oral daily  nystatin Powder 1 Application(s) Topical three times a day  pantoprazole  Injectable 40 milliGRAM(s) IV Push daily  phenazopyridine 100 milliGRAM(s) Oral every 8 hours  sodium chloride 0.9% with potassium chloride 20 mEq/L 1000 milliLiter(s) (75 mL/Hr) IV Continuous <Continuous>  tamsulosin 0.4 milliGRAM(s) Oral at bedtime    MEDICATIONS  (PRN):  acetaminophen   Tablet .. 650 milliGRAM(s) Oral every 6 hours PRN Temp greater or equal to 38.5C (101.3F), Mild Pain (1 - 3)  HYDROmorphone  Injectable 0.5 milliGRAM(s) IV Push every 3 hours PRN Severe Pain (7 - 10)  oxyCODONE    IR 5 milliGRAM(s) Oral every 4 hours PRN Moderate Pain (4 - 6)

## 2019-06-27 LAB
ALBUMIN SERPL ELPH-MCNC: 2.2 G/DL — LOW (ref 3.3–5)
ALP SERPL-CCNC: 52 U/L — SIGNIFICANT CHANGE UP (ref 40–120)
ALT FLD-CCNC: 38 U/L — SIGNIFICANT CHANGE UP (ref 12–78)
ANION GAP SERPL CALC-SCNC: 8 MMOL/L — SIGNIFICANT CHANGE UP (ref 5–17)
AST SERPL-CCNC: 45 U/L — HIGH (ref 15–37)
BASOPHILS # BLD AUTO: 0.02 K/UL — SIGNIFICANT CHANGE UP (ref 0–0.2)
BASOPHILS NFR BLD AUTO: 0.3 % — SIGNIFICANT CHANGE UP (ref 0–2)
BILIRUB SERPL-MCNC: 0.3 MG/DL — SIGNIFICANT CHANGE UP (ref 0.2–1.2)
BUN SERPL-MCNC: 10 MG/DL — SIGNIFICANT CHANGE UP (ref 7–23)
CALCIUM SERPL-MCNC: 7.9 MG/DL — LOW (ref 8.5–10.1)
CHLORIDE SERPL-SCNC: 97 MMOL/L — SIGNIFICANT CHANGE UP (ref 96–108)
CO2 SERPL-SCNC: 29 MMOL/L — SIGNIFICANT CHANGE UP (ref 22–31)
CREAT SERPL-MCNC: 1.1 MG/DL — SIGNIFICANT CHANGE UP (ref 0.5–1.3)
CULTURE RESULTS: NO GROWTH — SIGNIFICANT CHANGE UP
EOSINOPHIL # BLD AUTO: 0.09 K/UL — SIGNIFICANT CHANGE UP (ref 0–0.5)
EOSINOPHIL NFR BLD AUTO: 1.2 % — SIGNIFICANT CHANGE UP (ref 0–6)
GLUCOSE SERPL-MCNC: 118 MG/DL — HIGH (ref 70–99)
HCT VFR BLD CALC: 34.9 % — LOW (ref 39–50)
HGB BLD-MCNC: 12.3 G/DL — LOW (ref 13–17)
IMM GRANULOCYTES NFR BLD AUTO: 0.7 % — SIGNIFICANT CHANGE UP (ref 0–1.5)
LYMPHOCYTES # BLD AUTO: 1.03 K/UL — SIGNIFICANT CHANGE UP (ref 1–3.3)
LYMPHOCYTES # BLD AUTO: 14.2 % — SIGNIFICANT CHANGE UP (ref 13–44)
MCHC RBC-ENTMCNC: 31.2 PG — SIGNIFICANT CHANGE UP (ref 27–34)
MCHC RBC-ENTMCNC: 35.2 GM/DL — SIGNIFICANT CHANGE UP (ref 32–36)
MCV RBC AUTO: 88.6 FL — SIGNIFICANT CHANGE UP (ref 80–100)
MONOCYTES # BLD AUTO: 0.81 K/UL — SIGNIFICANT CHANGE UP (ref 0–0.9)
MONOCYTES NFR BLD AUTO: 11.2 % — SIGNIFICANT CHANGE UP (ref 2–14)
NEUTROPHILS # BLD AUTO: 5.23 K/UL — SIGNIFICANT CHANGE UP (ref 1.8–7.4)
NEUTROPHILS NFR BLD AUTO: 72.4 % — SIGNIFICANT CHANGE UP (ref 43–77)
NRBC # BLD: 0 /100 WBCS — SIGNIFICANT CHANGE UP (ref 0–0)
PLATELET # BLD AUTO: 452 K/UL — HIGH (ref 150–400)
POTASSIUM SERPL-MCNC: 3.5 MMOL/L — SIGNIFICANT CHANGE UP (ref 3.5–5.3)
POTASSIUM SERPL-SCNC: 3.5 MMOL/L — SIGNIFICANT CHANGE UP (ref 3.5–5.3)
PROCALCITONIN SERPL-MCNC: 0.12 NG/ML — HIGH (ref 0–0.04)
PROT SERPL-MCNC: 5.6 G/DL — LOW (ref 6–8.3)
RBC # BLD: 3.94 M/UL — LOW (ref 4.2–5.8)
RBC # FLD: 13.5 % — SIGNIFICANT CHANGE UP (ref 10.3–14.5)
SODIUM SERPL-SCNC: 134 MMOL/L — LOW (ref 135–145)
SPECIMEN SOURCE: SIGNIFICANT CHANGE UP
WBC # BLD: 7.23 K/UL — SIGNIFICANT CHANGE UP (ref 3.8–10.5)
WBC # FLD AUTO: 7.23 K/UL — SIGNIFICANT CHANGE UP (ref 3.8–10.5)

## 2019-06-27 PROCEDURE — 74018 RADEX ABDOMEN 1 VIEW: CPT | Mod: 26

## 2019-06-27 PROCEDURE — 99233 SBSQ HOSP IP/OBS HIGH 50: CPT

## 2019-06-27 PROCEDURE — 99232 SBSQ HOSP IP/OBS MODERATE 35: CPT

## 2019-06-27 RX ORDER — LISINOPRIL 2.5 MG/1
2.5 TABLET ORAL DAILY
Refills: 0 | Status: DISCONTINUED | OUTPATIENT
Start: 2019-06-27 | End: 2019-06-30

## 2019-06-27 RX ADMIN — NYSTATIN CREAM 1 APPLICATION(S): 100000 CREAM TOPICAL at 17:31

## 2019-06-27 RX ADMIN — Medication 60 MILLIGRAM(S): at 08:18

## 2019-06-27 RX ADMIN — NYSTATIN CREAM 1 APPLICATION(S): 100000 CREAM TOPICAL at 22:00

## 2019-06-27 RX ADMIN — FINASTERIDE 5 MILLIGRAM(S): 5 TABLET, FILM COATED ORAL at 12:40

## 2019-06-27 RX ADMIN — NYSTATIN CREAM 1 APPLICATION(S): 100000 CREAM TOPICAL at 06:00

## 2019-06-27 RX ADMIN — Medication 50 MILLIGRAM(S): at 08:18

## 2019-06-27 RX ADMIN — PANTOPRAZOLE SODIUM 40 MILLIGRAM(S): 20 TABLET, DELAYED RELEASE ORAL at 12:38

## 2019-06-27 RX ADMIN — TAMSULOSIN HYDROCHLORIDE 0.4 MILLIGRAM(S): 0.4 CAPSULE ORAL at 21:32

## 2019-06-27 RX ADMIN — TAMSULOSIN HYDROCHLORIDE 0.4 MILLIGRAM(S): 0.4 CAPSULE ORAL at 08:18

## 2019-06-27 RX ADMIN — Medication 100 GRAM(S): at 17:34

## 2019-06-27 RX ADMIN — Medication 100 GRAM(S): at 06:00

## 2019-06-27 RX ADMIN — Medication 100 MILLIGRAM(S): at 08:18

## 2019-06-27 RX ADMIN — Medication 100 MILLIGRAM(S): at 21:32

## 2019-06-27 RX ADMIN — Medication 100 MILLIGRAM(S): at 17:30

## 2019-06-27 RX ADMIN — ENOXAPARIN SODIUM 40 MILLIGRAM(S): 100 INJECTION SUBCUTANEOUS at 17:30

## 2019-06-27 RX ADMIN — Medication 100 MILLIGRAM(S): at 21:33

## 2019-06-27 RX ADMIN — ATORVASTATIN CALCIUM 80 MILLIGRAM(S): 80 TABLET, FILM COATED ORAL at 21:33

## 2019-06-27 RX ADMIN — LISINOPRIL 2.5 MILLIGRAM(S): 2.5 TABLET ORAL at 12:47

## 2019-06-27 NOTE — PROGRESS NOTE ADULT - SUBJECTIVE AND OBJECTIVE BOX
Patient is a 89y old  Male who presents with a chief complaint of abdominal pain (2019 11:35)      INTERVAL HPI: Pt seen and examined. States he is not feeling too well, vomited last night and this morning, states that is urinating better today. Denies any other acute complaints at this time.     OVERNIGHT EVENTS: vomited x2 as above  T(F): 97.5 (19 @ 13:45), Max: 98.2 (19 @ 05:14)  HR: 96 (19 @ 13:45) (96 - 105)  BP: 99/62 (19 @ 13:45) (99/62 - 168/73)  RR: 16 (19 @ 13:45) (16 - 16)  SpO2: 94% (19 @ 13:45) (92% - 94%)  I&O's Summary    2019 07:01  -  2019 07:00  --------------------------------------------------------  IN: 825 mL / OUT: 825 mL / NET: 0 mL        REVIEW OF SYSTEMS:  CONSTITUTIONAL: No fever, weight loss, or fatigue  RESPIRATORY: No cough, wheezing, chills or hemoptysis; No shortness of breath  CARDIOVASCULAR: No chest pain, palpitations, dizziness, or leg swelling  GASTROINTESTINAL: +vomiting  GENITOURINARY: No dysuria, frequency, hematuria, or incontinence  NEUROLOGICAL: No headaches, memory loss, loss of strength, numbness, or tremors  SKIN: No itching, burning, rashes, or lesions   MUSCULOSKELETAL: No joint pain or swelling; No muscle, back, or extremity pain  PSYCHIATRIC: No depression, anxiety, mood swings, or difficulty sleeping      PHYSICAL EXAM:  GENERAL: NAD, well-groomed, elder  NERVOUS SYSTEM:  Alert & Oriented X3, Good concentration; Motor Strength 4/5 B/L upper and lower extremities CHEST/LUNG: Clear to percussion bilaterally; No rales, rhonchi, wheezing, or rubs  HEART: Regular rate and rhythm; No murmurs, rubs, or gallops  ABDOMEN: Soft, Nontender, Nondistended; Bowel sounds present  EXTREMITIES:  2+ Peripheral Pulses, No clubbing, cyanosis, or edema  SKIN: No rashes or lesions except vertical incision midline abdomen well healing, clean dry and intact    LABS:                        12.3   7.23  )-----------( 452      ( 2019 08:05 )             34.9     06    134<L>  |  97  |  10  ----------------------------<  118<H>  3.5   |  29  |  1.10    Ca    7.9<L>      2019 08:05  Mg     2.0     -    TPro  5.6<L>  /  Alb  2.2<L>  /  TBili  0.3  /  DBili  x   /  AST  45<H>  /  ALT  38  /  AlkPhos  52        Urinalysis Basic - ( 2019 06:46 )    Color: Yellow / Appearance: Slightly Turbid / S.015 / pH: x  Gluc: x / Ketone: Negative  / Bili: Negative / Urobili: Negative   Blood: x / Protein: 25 mg/dL / Nitrite: Negative   Leuk Esterase: Negative / RBC: 11-25 /HPF / WBC 0-2   Sq Epi: x / Non Sq Epi: Occasional / Bacteria: x      CAPILLARY BLOOD GLUCOSE          06-26 @ 11:15   No growth  --  --          MEDICATIONS  (STANDING):  atorvastatin 80 milliGRAM(s) Oral at bedtime  cefoTEtan  IVPB 2 Gram(s) IV Intermittent every 12 hours  docusate sodium 100 milliGRAM(s) Oral three times a day  enoxaparin Injectable 40 milliGRAM(s) SubCutaneous daily  finasteride 5 milliGRAM(s) Oral daily  lisinopril 2.5 milliGRAM(s) Oral daily  metoprolol succinate ER 50 milliGRAM(s) Oral daily  NIFEdipine XL 60 milliGRAM(s) Oral daily  nystatin Powder 1 Application(s) Topical three times a day  pantoprazole  Injectable 40 milliGRAM(s) IV Push daily  phenazopyridine 100 milliGRAM(s) Oral every 8 hours  sodium chloride 0.9% with potassium chloride 20 mEq/L 1000 milliLiter(s) (75 mL/Hr) IV Continuous <Continuous>  tamsulosin 0.4 milliGRAM(s) Oral two times a day    MEDICATIONS  (PRN):  acetaminophen   Tablet .. 650 milliGRAM(s) Oral every 6 hours PRN Temp greater or equal to 38.5C (101.3F), Mild Pain (1 - 3)  HYDROmorphone  Injectable 0.5 milliGRAM(s) IV Push every 3 hours PRN Severe Pain (7 - 10)  oxyCODONE    IR 5 milliGRAM(s) Oral every 4 hours PRN Moderate Pain (4 - 6)  simethicone 80 milliGRAM(s) Chew three times a day PRN Gas

## 2019-06-27 NOTE — PROGRESS NOTE ADULT - SUBJECTIVE AND OBJECTIVE BOX
[facility] -- Nicole Mayer, Radha, Zachery, Elmer Prasad Savella  Office # [phone]      Follow Up:    Preop eval  Subjective/Observations:   Resting comfortably in bed.  No complaints of chest pain, dyspnea, or palpitations reported. No signs of orthopnea or PND. Overnight events noted.    REVIEW OF SYSTEMS: All other review of systems is negative unless indicated above    PAST MEDICAL & SURGICAL HISTORY:  CAD (coronary artery disease): s/p CABG, 1995  Anginal pain  HTN (hypertension)  Enlarged prostate  H/O inguinal hernia repair: bilateral  S/P TURP: x2      MEDICATIONS  (STANDING):  atorvastatin 80 milliGRAM(s) Oral at bedtime  cefoTEtan  IVPB 2 Gram(s) IV Intermittent every 12 hours  docusate sodium 100 milliGRAM(s) Oral three times a day  enoxaparin Injectable 40 milliGRAM(s) SubCutaneous daily  finasteride 5 milliGRAM(s) Oral daily  metoprolol succinate ER 50 milliGRAM(s) Oral daily  NIFEdipine XL 60 milliGRAM(s) Oral daily  nystatin Powder 1 Application(s) Topical three times a day  pantoprazole  Injectable 40 milliGRAM(s) IV Push daily  phenazopyridine 100 milliGRAM(s) Oral every 8 hours  sodium chloride 0.9% with potassium chloride 20 mEq/L 1000 milliLiter(s) (75 mL/Hr) IV Continuous <Continuous>  tamsulosin 0.4 milliGRAM(s) Oral two times a day    MEDICATIONS  (PRN):  acetaminophen   Tablet .. 650 milliGRAM(s) Oral every 6 hours PRN Temp greater or equal to 38.5C (101.3F), Mild Pain (1 - 3)  HYDROmorphone  Injectable 0.5 milliGRAM(s) IV Push every 3 hours PRN Severe Pain (7 - 10)  oxyCODONE    IR 5 milliGRAM(s) Oral every 4 hours PRN Moderate Pain (4 - 6)  simethicone 80 milliGRAM(s) Chew three times a day PRN Gas      Allergies    No Known Allergies    Intolerances        Vital Signs Last 24 Hrs  T(C): 36.8 (27 Jun 2019 05:14), Max: 36.8 (27 Jun 2019 05:14)  T(F): 98.2 (27 Jun 2019 05:14), Max: 98.2 (27 Jun 2019 05:14)  HR: 105 (27 Jun 2019 05:14) (101 - 105)  BP: 152/97 (27 Jun 2019 05:14) (130/83 - 168/73)  BP(mean): --  RR: 16 (27 Jun 2019 05:14) (16 - 17)  SpO2: 92% (27 Jun 2019 05:14) (92% - 94%)    I&O's Summary    26 Jun 2019 07:01  -  27 Jun 2019 07:00  --------------------------------------------------------  IN: 825 mL / OUT: 825 mL / NET: 0 mL          PHYSICAL EXAM:  TELE: Off tele   Constitutional: NAD, awake and alert, well-developed  HEENT: Moist Mucous Membranes, Anicteric  Pulmonary: Non-labored, breath sounds are clear bilaterally, No wheezing, crackles or rhonchi  Cardiovascular: Regular, S1 and S2 nl, No murmurs, rubs, gallops or clicks  Gastrointestinal: Bowel Sounds present, soft, nontender.   Lymph: No lymphadenopathy. No peripheral edema.  Skin: No visible rashes or ulcers.  Psych:  Mood & affect appropriate    LABS: All Labs Reviewed:                        12.3   7.23  )-----------( 452      ( 27 Jun 2019 08:05 )             34.9                         12.3   6.17  )-----------( 385      ( 26 Jun 2019 07:48 )             35.5     27 Jun 2019 08:05    134    |  97     |  10     ----------------------------<  118    3.5     |  29     |  1.10   26 Jun 2019 07:48    135    |  99     |  17     ----------------------------<  121    3.5     |  29     |  1.20   25 Jun 2019 08:14    134    |  96     |  22     ----------------------------<  102    3.6     |  30     |  1.20     Ca    7.9        27 Jun 2019 08:05  Ca    7.8        26 Jun 2019 07:48  Ca    8.1        25 Jun 2019 08:14  Mg     2.0       26 Jun 2019 07:48  Mg     2.0       25 Jun 2019 08:14    TPro  5.6    /  Alb  2.2    /  TBili  0.3    /  DBili  x      /  AST  45     /  ALT  38     /  AlkPhos  52     27 Jun 2019 08:05  TPro  5.4    /  Alb  2.2    /  TBili  0.3    /  DBili  x      /  AST  44     /  ALT  36     /  AlkPhos  52     26 Jun 2019 07:48        < from: Xray Chest 2 Views PA/Lat (06.21.19 @ 08:58) >    EXAM:  XR CHEST PA LAT 2V                          PROCEDURE DATE:  06/21/2019      INTERPRETATION:  Interval imaging study.    PA lateral. Prior 6/20/2019.    Impression: Very low lung volumes. No change in left basal retrocardiac   infiltrate/atelectasis small bilateral pleural effusions. Remainder study   also unchanged. No new abnormality.    LEORA MOORE M.D., ATTENDING RADIOLOGIST  This document has been electronically signed. Jun 21 2019 11:09AM     < end of copied text >    < from: 12 Lead ECG (06.20.19 @ 15:29) >    Ventricular Rate 106 BPM    Atrial Rate 106 BPM    P-R Interval 184 ms    QRS Duration 108 ms    Q-T Interval 342 ms    QTC Calculation(Bezet) 454 ms    P Axis 41 degrees    R Axis -58 degrees    T Axis 74 degrees    Diagnosis Line Sinus tachycardia  Left anterior fascicular block  Abnormal ECG  Confirmed by DEEPAK PRASAD ([id number]) on 6/21/2019 8:18:12 AM    < end of copied text >         [facility]gan [facility]   Cardiology

## 2019-06-27 NOTE — PROGRESS NOTE ADULT - SUBJECTIVE AND OBJECTIVE BOX
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    Vital Signs Last 24 Hrs  T(C): 36.8 (2019 05:14), Max: 36.8 (2019 05:14)  T(F): 98.2 (2019 05:14), Max: 98.2 (2019 05:14)  HR: 105 (2019 05:14) (101 - 105)  BP: 152/97 (2019 05:14) (130/83 - 168/73)  BP(mean): --  RR: 16 (2019 05:14) (16 - 17)  SpO2: 92% (2019 05:14) (92% - 94%)    PHYSICAL EXAM  GENERAL:  Elderly male lying comfortably in bed in NAD  HEAD:  Normocephalic, atraumatic  ABDOMEN:  Soft, nondistended, mild incisional TTP; Midline incision without signs of infection.  +BS. No rebound tenderness or guarding.  NEURO:  A&O x 3    I&O's Summary    2019 07:  -  2019 07:00  --------------------------------------------------------  IN: 825 mL / OUT: 825 mL / NET: 0 mL    I&O's Detail    2019 07:  -  2019 07:00  --------------------------------------------------------  IN:    sodium chloride 0.9% with potassium chloride 20 mEq/L: 825 mL  Total IN: 825 mL    OUT:    Voided: 825 mL  Total OUT: 825 mL    Total NET: 0 mL    MEDICATIONS  (STANDING):  atorvastatin 80 milliGRAM(s) Oral at bedtime  cefoTEtan  IVPB 2 Gram(s) IV Intermittent every 12 hours  docusate sodium 100 milliGRAM(s) Oral three times a day  enoxaparin Injectable 40 milliGRAM(s) SubCutaneous daily  finasteride 5 milliGRAM(s) Oral daily  lisinopril 2.5 milliGRAM(s) Oral daily  metoprolol succinate ER 50 milliGRAM(s) Oral daily  NIFEdipine XL 60 milliGRAM(s) Oral daily  nystatin Powder 1 Application(s) Topical three times a day  pantoprazole  Injectable 40 milliGRAM(s) IV Push daily  phenazopyridine 100 milliGRAM(s) Oral every 8 hours  sodium chloride 0.9% with potassium chloride 20 mEq/L 1000 milliLiter(s) (75 mL/Hr) IV Continuous <Continuous>  tamsulosin 0.4 milliGRAM(s) Oral two times a day    MEDICATIONS  (PRN):  acetaminophen   Tablet .. 650 milliGRAM(s) Oral every 6 hours PRN Temp greater or equal to 38.5C (101.3F), Mild Pain (1 - 3)  HYDROmorphone  Injectable 0.5 milliGRAM(s) IV Push every 3 hours PRN Severe Pain (7 - 10)  oxyCODONE    IR 5 milliGRAM(s) Oral every 4 hours PRN Moderate Pain (4 - 6)  simethicone 80 milliGRAM(s) Chew three times a day PRN Gas    LABS:                        12.3   7.23  )-----------( 452      ( 2019 08:05 )             34.9     06-    134<L>  |  97  |  10  ----------------------------<  118<H>  3.5   |  29  |  1.10    Ca    7.9<L>      2019 08:05  Mg     2.0         TPro  5.6<L>  /  Alb  2.2<L>  /  TBili  0.3  /  DBili  x   /  AST  45<H>  /  ALT  38  /  AlkPhos  52  06    Urinalysis Basic - ( 2019 06:46 )  Color: Yellow / Appearance: Slightly Turbid / S.015 / pH: x  Gluc: x / Ketone: Negative  / Bili: Negative / Urobili: Negative   Blood: x / Protein: 25 mg/dL / Nitrite: Negative   Leuk Esterase: Negative / RBC: 11-25 /HPF / WBC 0-2   Sq Epi: x / Non Sq Epi: Occasional / Bacteria: x    Culture - Urine (collected 2019 11:15)  Source: .Urine Clean Catch (Midstream)  Final Report (2019 06:54):    No growth    ASSESSMENT & PLAN  89 year old male POD# 6 s/p right hemicolectomy with 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  Void 825cc/24hrs noted.    - NPO/IVF  - Abdominal xray ordered. Will follow up.  - Serial abdominal exams  - DVT prophylaxis with lovenox  - OOB  - Case discussed with Dr. Coleman (covering Dr. 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  As per nursing staff, patient likely gagged on a pill overnight, and this AM spontaneously vomited a very small amount.  Patient ate jello for breakfast this AM with no subsequent N/V.    Vital Signs Last 24 Hrs  T(C): 36.8 (2019 05:14), Max: 36.8 (2019 05:14)  T(F): 98.2 (2019 05:14), Max: 98.2 (2019 05:14)  HR: 105 (2019 05:14) (101 - 105)  BP: 152/97 (2019 05:14) (130/83 - 168/73)  BP(mean): --  RR: 16 (2019 05:14) (16 - 17)  SpO2: 92% (2019 05:14) (92% - 94%)    PHYSICAL EXAM  GENERAL:  Elderly male lying comfortably in bed in NAD  HEAD:  Normocephalic, atraumatic  ABDOMEN:  Soft, nondistended, mild incisional TTP; Midline incision without signs of infection.  +BS. No rebound tenderness or guarding.  NEURO:  A&O x 3    I&O's Summary    2019 07:  -  2019 07:00  --------------------------------------------------------  IN: 825 mL / OUT: 825 mL / NET: 0 mL    I&O's Detail    2019 07:  -  2019 07:00  --------------------------------------------------------  IN:    sodium chloride 0.9% with potassium chloride 20 mEq/L: 825 mL  Total IN: 825 mL    OUT:    Voided: 825 mL  Total OUT: 825 mL    Total NET: 0 mL    MEDICATIONS  (STANDING):  atorvastatin 80 milliGRAM(s) Oral at bedtime  cefoTEtan  IVPB 2 Gram(s) IV Intermittent every 12 hours  docusate sodium 100 milliGRAM(s) Oral three times a day  enoxaparin Injectable 40 milliGRAM(s) SubCutaneous daily  finasteride 5 milliGRAM(s) Oral daily  lisinopril 2.5 milliGRAM(s) Oral daily  metoprolol succinate ER 50 milliGRAM(s) Oral daily  NIFEdipine XL 60 milliGRAM(s) Oral daily  nystatin Powder 1 Application(s) Topical three times a day  pantoprazole  Injectable 40 milliGRAM(s) IV Push daily  phenazopyridine 100 milliGRAM(s) Oral every 8 hours  sodium chloride 0.9% with potassium chloride 20 mEq/L 1000 milliLiter(s) (75 mL/Hr) IV Continuous <Continuous>  tamsulosin 0.4 milliGRAM(s) Oral two times a day    MEDICATIONS  (PRN):  acetaminophen   Tablet .. 650 milliGRAM(s) Oral every 6 hours PRN Temp greater or equal to 38.5C (101.3F), Mild Pain (1 - 3)  HYDROmorphone  Injectable 0.5 milliGRAM(s) IV Push every 3 hours PRN Severe Pain (7 - 10)  oxyCODONE    IR 5 milliGRAM(s) Oral every 4 hours PRN Moderate Pain (4 - 6)  simethicone 80 milliGRAM(s) Chew three times a day PRN Gas    LABS:                        12.3   7.23  )-----------( 452      ( 2019 08:05 )             34.9     06-    134<L>  |  97  |  10  ----------------------------<  118<H>  3.5   |  29  |  1.10    Ca    7.9<L>      2019 08:05  Mg     2.0         TPro  5.6<L>  /  Alb  2.2<L>  /  TBili  0.3  /  DBili  x   /  AST  45<H>  /  ALT  38  /  AlkPhos  52  06-27    Urinalysis Basic - ( 2019 06:46 )  Color: Yellow / Appearance: Slightly Turbid / S.015 / pH: x  Gluc: x / Ketone: Negative  / Bili: Negative / Urobili: Negative   Blood: x / Protein: 25 mg/dL / Nitrite: Negative   Leuk Esterase: Negative / RBC: 11-25 /HPF / WBC 0-2   Sq Epi: x / Non Sq Epi: Occasional / Bacteria: x    Culture - Urine (collected 2019 11:15)  Source: .Urine Clean Catch (Midstream)  Final Report (2019 06:54):    No growth    from: Xray Abdomen 1 View PORTABLE -Urgent (19 @ 08:59):  There is mild distention of small bowel loops. Gas noted in the colon and   rectum. Findings likely represent postoperative ileus. No definite   obstruction.    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  Void 825cc/24hrs noted.    - NPO for now with ice chips/sips of water, if patient continues to feel well, may advance for dinner  - Serial abdominal exams  - DVT prophylaxis with lovenox  - OOB  - Case discussed with Dr. Coleman (covering Dr. Tse)

## 2019-06-27 NOTE — PROGRESS NOTE ADULT - ASSESSMENT
89M w/pmh of CAD s/p CABG (1995), b/l inguinal hernia repairs, enlarged prostate s/p TURP x2, HTN and HLD presenting with abdominal pain for 4 days. 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

## 2019-06-27 NOTE — PROGRESS NOTE ADULT - ASSESSMENT
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  - Tolerated procedure well   - Can hold aspirin until cleared by surg  - Pain control  - He had urinary retention overnight post wade removal.  Recommend Uro consult    CAD   - Stable  - Can hold aspirin until cleared by surgery, but please resume when able given CABG  - Continue BB  - will start lisinopril 2.5 mg daily with hold parameters   - Continue Statin  - Monitor and replete lytes as needed    HTN  - will start lisinopril 2.5 mg daily with hold parameters     - Continue BB/ nifedipine  - Monitor routine hemodynamics.     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  - Tolerated procedure well   - Can hold aspirin until cleared by surg  - Pain control  - He had urinary retention overnight post wade removal.  Recommend Uro consult    CAD   - Stable  - Can hold aspirin until cleared by surgery, but please resume when able given CABG  - Continue BB  - will start lisinopril 2.5 mg daily with hold parameters   - Continue Statin  - Monitor and replete lytes as needed    HTN  - will start lisinopril 2.5 mg daily with hold parameters     - Continue BB/ nifedipine  - Monitor routine hemodynamics.     DVT ppx  - Per Primary    - To follow closely with you    Cody Egan [facility]  Cardiology   Spectra #[phone]

## 2019-06-28 LAB
ANION GAP SERPL CALC-SCNC: 7 MMOL/L — SIGNIFICANT CHANGE UP (ref 5–17)
BUN SERPL-MCNC: 11 MG/DL — SIGNIFICANT CHANGE UP (ref 7–23)
CALCIUM SERPL-MCNC: 7.7 MG/DL — LOW (ref 8.5–10.1)
CHLORIDE SERPL-SCNC: 103 MMOL/L — SIGNIFICANT CHANGE UP (ref 96–108)
CO2 SERPL-SCNC: 28 MMOL/L — SIGNIFICANT CHANGE UP (ref 22–31)
CREAT SERPL-MCNC: 0.83 MG/DL — SIGNIFICANT CHANGE UP (ref 0.5–1.3)
GLUCOSE SERPL-MCNC: 93 MG/DL — SIGNIFICANT CHANGE UP (ref 70–99)
HCT VFR BLD CALC: 32.9 % — LOW (ref 39–50)
HGB BLD-MCNC: 11.6 G/DL — LOW (ref 13–17)
MAGNESIUM SERPL-MCNC: 1.9 MG/DL — SIGNIFICANT CHANGE UP (ref 1.6–2.6)
MCHC RBC-ENTMCNC: 32.4 PG — SIGNIFICANT CHANGE UP (ref 27–34)
MCHC RBC-ENTMCNC: 35.3 GM/DL — SIGNIFICANT CHANGE UP (ref 32–36)
MCV RBC AUTO: 91.9 FL — SIGNIFICANT CHANGE UP (ref 80–100)
NRBC # BLD: 0 /100 WBCS — SIGNIFICANT CHANGE UP (ref 0–0)
PLATELET # BLD AUTO: 403 K/UL — HIGH (ref 150–400)
POTASSIUM SERPL-MCNC: 3.8 MMOL/L — SIGNIFICANT CHANGE UP (ref 3.5–5.3)
POTASSIUM SERPL-SCNC: 3.8 MMOL/L — SIGNIFICANT CHANGE UP (ref 3.5–5.3)
RBC # BLD: 3.58 M/UL — LOW (ref 4.2–5.8)
RBC # FLD: 13.8 % — SIGNIFICANT CHANGE UP (ref 10.3–14.5)
SODIUM SERPL-SCNC: 138 MMOL/L — SIGNIFICANT CHANGE UP (ref 135–145)
WBC # BLD: 7.06 K/UL — SIGNIFICANT CHANGE UP (ref 3.8–10.5)
WBC # FLD AUTO: 7.06 K/UL — SIGNIFICANT CHANGE UP (ref 3.8–10.5)

## 2019-06-28 PROCEDURE — 99233 SBSQ HOSP IP/OBS HIGH 50: CPT

## 2019-06-28 PROCEDURE — 99232 SBSQ HOSP IP/OBS MODERATE 35: CPT

## 2019-06-28 RX ADMIN — TAMSULOSIN HYDROCHLORIDE 0.4 MILLIGRAM(S): 0.4 CAPSULE ORAL at 06:13

## 2019-06-28 RX ADMIN — NYSTATIN CREAM 1 APPLICATION(S): 100000 CREAM TOPICAL at 06:13

## 2019-06-28 RX ADMIN — Medication 650 MILLIGRAM(S): at 15:58

## 2019-06-28 RX ADMIN — DEXTROSE MONOHYDRATE, SODIUM CHLORIDE, AND POTASSIUM CHLORIDE 75 MILLILITER(S): 50; .745; 4.5 INJECTION, SOLUTION INTRAVENOUS at 00:47

## 2019-06-28 RX ADMIN — NYSTATIN CREAM 1 APPLICATION(S): 100000 CREAM TOPICAL at 22:09

## 2019-06-28 RX ADMIN — TAMSULOSIN HYDROCHLORIDE 0.4 MILLIGRAM(S): 0.4 CAPSULE ORAL at 17:18

## 2019-06-28 RX ADMIN — Medication 100 MILLIGRAM(S): at 13:25

## 2019-06-28 RX ADMIN — Medication 650 MILLIGRAM(S): at 03:57

## 2019-06-28 RX ADMIN — LISINOPRIL 2.5 MILLIGRAM(S): 2.5 TABLET ORAL at 06:14

## 2019-06-28 RX ADMIN — Medication 100 MILLIGRAM(S): at 06:13

## 2019-06-28 RX ADMIN — NYSTATIN CREAM 1 APPLICATION(S): 100000 CREAM TOPICAL at 13:25

## 2019-06-28 RX ADMIN — Medication 100 MILLIGRAM(S): at 06:14

## 2019-06-28 RX ADMIN — ENOXAPARIN SODIUM 40 MILLIGRAM(S): 100 INJECTION SUBCUTANEOUS at 12:14

## 2019-06-28 RX ADMIN — ATORVASTATIN CALCIUM 80 MILLIGRAM(S): 80 TABLET, FILM COATED ORAL at 22:09

## 2019-06-28 RX ADMIN — PANTOPRAZOLE SODIUM 40 MILLIGRAM(S): 20 TABLET, DELAYED RELEASE ORAL at 12:14

## 2019-06-28 RX ADMIN — Medication 60 MILLIGRAM(S): at 06:14

## 2019-06-28 RX ADMIN — Medication 100 MILLIGRAM(S): at 22:09

## 2019-06-28 RX ADMIN — Medication 50 MILLIGRAM(S): at 06:13

## 2019-06-28 RX ADMIN — DEXTROSE MONOHYDRATE, SODIUM CHLORIDE, AND POTASSIUM CHLORIDE 75 MILLILITER(S): 50; .745; 4.5 INJECTION, SOLUTION INTRAVENOUS at 16:01

## 2019-06-28 RX ADMIN — Medication 650 MILLIGRAM(S): at 22:08

## 2019-06-28 RX ADMIN — Medication 650 MILLIGRAM(S): at 23:08

## 2019-06-28 RX ADMIN — FINASTERIDE 5 MILLIGRAM(S): 5 TABLET, FILM COATED ORAL at 12:14

## 2019-06-28 NOTE — PROGRESS NOTE ADULT - SUBJECTIVE AND OBJECTIVE BOX
Patient is a 89y old  Male who presents with a chief complaint of abdominal pain (28 Jun 2019 10:58)      INTERVAL HPI: Pt seen and examined. States he is feeling better today, denies any more nausea or vomiting, voiding well. tolerating clears well.     OVERNIGHT EVENTS: none noted  T(F): 98.2 (06-28-19 @ 05:17), Max: 98.6 (06-27-19 @ 20:30)  HR: 105 (06-28-19 @ 05:17) (100 - 105)  BP: 127/81 (06-28-19 @ 05:17) (123/73 - 127/81)  RR: 16 (06-28-19 @ 05:17) (16 - 17)  SpO2: 92% (06-28-19 @ 05:17) (90% - 92%)  I&O's Summary    27 Jun 2019 07:01  -  28 Jun 2019 07:00  --------------------------------------------------------  IN: 900 mL / OUT: 950 mL / NET: -50 mL        REVIEW OF SYSTEMS:  CONSTITUTIONAL: No fever, weight loss, or fatigue  RESPIRATORY: No cough, wheezing, chills or hemoptysis; No shortness of breath  CARDIOVASCULAR: No chest pain, palpitations, dizziness, or leg swelling  GASTROINTESTINAL: No abdominal or epigastric pain. No nausea, vomiting, or hematemesis; No diarrhea or constipation. No melena or hematochezia.  GENITOURINARY: No dysuria, frequency, hematuria, or incontinence  NEUROLOGICAL: No headaches, memory loss, loss of strength, numbness, or tremors  SKIN: No itching, burning, rashes, or lesions   MUSCULOSKELETAL: No joint pain or swelling; No muscle, back, or extremity pain  PSYCHIATRIC: No depression, anxiety, mood swings, or difficulty sleeping      PHYSICAL EXAM:  GENERAL: NAD, well-groomed, well-developed  NERVOUS SYSTEM:  Alert & Oriented X3, Good concentration; Motor Strength 4/5 B/L upper and lower extremities  CHEST/LUNG: Clear to percussion bilaterally; No rales, rhonchi, wheezing, or rubs  HEART: Regular rate and rhythm; No murmurs, rubs, or gallops  ABDOMEN: Soft, Nontender, Nondistended; Bowel sounds present  EXTREMITIES:  2+ Peripheral Pulses, No clubbing, cyanosis, or edema  LYMPH: No lymphadenopathy noted  SKIN: No rashes or lesions except midline abd scar well appearing and healing    LABS:                        11.6   7.06  )-----------( 403      ( 28 Jun 2019 08:29 )             32.9     06-28    138  |  103  |  11  ----------------------------<  93  3.8   |  28  |  0.83    Ca    7.7<L>      28 Jun 2019 08:29  Mg     1.9     06-28    TPro  5.6<L>  /  Alb  2.2<L>  /  TBili  0.3  /  DBili  x   /  AST  45<H>  /  ALT  38  /  AlkPhos  52  06-27        CAPILLARY BLOOD GLUCOSE          06-26 @ 11:15   No growth  --  --          MEDICATIONS  (STANDING):  atorvastatin 80 milliGRAM(s) Oral at bedtime  docusate sodium 100 milliGRAM(s) Oral three times a day  enoxaparin Injectable 40 milliGRAM(s) SubCutaneous daily  finasteride 5 milliGRAM(s) Oral daily  lisinopril 2.5 milliGRAM(s) Oral daily  metoprolol succinate ER 50 milliGRAM(s) Oral daily  NIFEdipine XL 60 milliGRAM(s) Oral daily  nystatin Powder 1 Application(s) Topical three times a day  pantoprazole  Injectable 40 milliGRAM(s) IV Push daily  phenazopyridine 100 milliGRAM(s) Oral every 8 hours  sodium chloride 0.9% with potassium chloride 20 mEq/L 1000 milliLiter(s) (75 mL/Hr) IV Continuous <Continuous>  tamsulosin 0.4 milliGRAM(s) Oral two times a day    MEDICATIONS  (PRN):  acetaminophen   Tablet .. 650 milliGRAM(s) Oral every 6 hours PRN Temp greater or equal to 38.5C (101.3F), Mild Pain (1 - 3)  simethicone 80 milliGRAM(s) Chew three times a day PRN Gas

## 2019-06-28 NOTE — CHART NOTE - NSCHARTNOTEFT_GEN_A_CORE
Called by RN for pt c/o bladder fullness. Bedside BS showing approx 250-300 cc. Will place Weaver at this time, as pt does not wish straight cath followed by Weaver. RN to call with any changes, will continue to monitor.    Vital Signs Last 24 Hrs  T(C): 36.8 (28 Jun 2019 05:17), Max: 36.8 (28 Jun 2019 05:17)  T(F): 98.2 (28 Jun 2019 05:17), Max: 98.2 (28 Jun 2019 05:17)  HR: 105 (28 Jun 2019 05:17) (105 - 105)  BP: 127/81 (28 Jun 2019 05:17) (127/81 - 127/81)  BP(mean): --  RR: 16 (28 Jun 2019 05:17) (16 - 16)  SpO2: 92% (28 Jun 2019 05:17) (92% - 92%) Called by RN for pt c/o bladder fullness. Bedside BS showing approx 250-300 cc. Will place Weaver at this time, as pt does not wish straight cath followed by Weaver. RN to call with any changes, will continue to monitor.    Vital Signs Last 24 Hrs  T(C): 36.8 (28 Jun 2019 05:17), Max: 36.8 (28 Jun 2019 05:17)  T(F): 98.2 (28 Jun 2019 05:17), Max: 98.2 (28 Jun 2019 05:17)  HR: 105 (28 Jun 2019 05:17) (105 - 105)  BP: 127/81 (28 Jun 2019 05:17) (127/81 - 127/81)  BP(mean): --  RR: 16 (28 Jun 2019 05:17) (16 - 16)  SpO2: 92% (28 Jun 2019 05:17) (92% - 92%)    *Addendum: Called by RN at 0500 for pain at urethral meatus. Pt already on pyridium. Pt seen and examined at bedside. Proper Weaver placement verified. Pt denies suprapubic pain or bladder discomfort. Will c/w Weaver at this time, and give 1x dose toradol 15mg IVP. RN to call with any changes, AM team to follow.

## 2019-06-28 NOTE — CHART NOTE - NSCHARTNOTEFT_GEN_A_CORE
Assessment: Pt was advanced post op to clears then full liquids, had n/v, now had BM and tolerating clears well. Advancing today to full liquids.     Factors impacting intake: [ ] none [ ] nausea  [ ] vomiting [ ] diarrhea [ ] constipation  [ ]chewing problems [ ] swallowing issues  [x ] other: post op ileus    Diet Presciption: Diet, Full Liquid (06-28-19 @ 10:58)    Intake: 50%    Current Weight: Weight (kg): 68.5 (06-21 @ 14:24)  % Weight Change    Pertinent Medications: MEDICATIONS  (STANDING):  atorvastatin 80 milliGRAM(s) Oral at bedtime  docusate sodium 100 milliGRAM(s) Oral three times a day  enoxaparin Injectable 40 milliGRAM(s) SubCutaneous daily  finasteride 5 milliGRAM(s) Oral daily  lisinopril 2.5 milliGRAM(s) Oral daily  metoprolol succinate ER 50 milliGRAM(s) Oral daily  NIFEdipine XL 60 milliGRAM(s) Oral daily  nystatin Powder 1 Application(s) Topical three times a day  pantoprazole  Injectable 40 milliGRAM(s) IV Push daily  phenazopyridine 100 milliGRAM(s) Oral every 8 hours  sodium chloride 0.9% with potassium chloride 20 mEq/L 1000 milliLiter(s) (75 mL/Hr) IV Continuous <Continuous>  tamsulosin 0.4 milliGRAM(s) Oral two times a day    MEDICATIONS  (PRN):  acetaminophen   Tablet .. 650 milliGRAM(s) Oral every 6 hours PRN Temp greater or equal to 38.5C (101.3F), Mild Pain (1 - 3)  simethicone 80 milliGRAM(s) Chew three times a day PRN Gas    Pertinent Labs: 06-28 Na138 mmol/L Glu 93 mg/dL K+ 3.8 mmol/L Cr  0.83 mg/dL BUN 11 mg/dL 06-27 Alb 2.2 g/dL<L>     CAPILLARY BLOOD GLUCOSE        Skin: no pressure injuries    Estimated Needs:   [x ] no change since previous assessment  [ ] recalculated:     Previous Nutrition Diagnosis:   [ ] Inadequate Energy Intake [ ]Inadequate Oral Intake [ ] Excessive Energy Intake   [ ] Underweight [ ] Increased Nutrient Needs [ ] Overweight/Obesity   [x ] Altered GI Function [ ] Unintended Weight Loss [ ] Food & Nutrition Related Knowledge Deficit [ ] Malnutrition     Nutrition Diagnosis is [x ] ongoing  [ ] resolved [ ] not applicable     New Nutrition Diagnosis: [ ] not applicable       Interventions:   Recommend  [x ] Change Diet To: low fiber when cleared by surgery  [ ] Nutrition Supplement  [ ] Nutrition Support  [ ] Other:     Monitoring and Evaluation:   [ ] PO intake [ x ] Tolerance to diet prescription [ x ] weights [ x ] labs[ x ] follow up per protocol  [ ] other:

## 2019-06-28 NOTE — PROGRESS NOTE ADULT - SUBJECTIVE AND OBJECTIVE BOX
[facility] -- Nicole Mayer, Radha, Zachery, Elmer Prasad Savella  Office # [phone]      Follow Up:    Preop clearance/Post follow up   Subjective/Observations:   No events overnight resting comfortably in bed.  No complaints of chest pain, dyspnea, or palpitations reported. No signs of orthopnea or PND.     REVIEW OF SYSTEMS: All other review of systems is negative unless indicated above    PAST MEDICAL & SURGICAL HISTORY:  CAD (coronary artery disease): s/p CABG, 1995  Anginal pain  HTN (hypertension)  Enlarged prostate  H/O inguinal hernia repair: bilateral  S/P TURP: x2      MEDICATIONS  (STANDING):  atorvastatin 80 milliGRAM(s) Oral at bedtime  docusate sodium 100 milliGRAM(s) Oral three times a day  enoxaparin Injectable 40 milliGRAM(s) SubCutaneous daily  finasteride 5 milliGRAM(s) Oral daily  lisinopril 2.5 milliGRAM(s) Oral daily  metoprolol succinate ER 50 milliGRAM(s) Oral daily  NIFEdipine XL 60 milliGRAM(s) Oral daily  nystatin Powder 1 Application(s) Topical three times a day  pantoprazole  Injectable 40 milliGRAM(s) IV Push daily  phenazopyridine 100 milliGRAM(s) Oral every 8 hours  sodium chloride 0.9% with potassium chloride 20 mEq/L 1000 milliLiter(s) (75 mL/Hr) IV Continuous <Continuous>  tamsulosin 0.4 milliGRAM(s) Oral two times a day    MEDICATIONS  (PRN):  acetaminophen   Tablet .. 650 milliGRAM(s) Oral every 6 hours PRN Temp greater or equal to 38.5C (101.3F), Mild Pain (1 - 3)  simethicone 80 milliGRAM(s) Chew three times a day PRN Gas      Allergies    No Known Allergies    Intolerances        Vital Signs Last 24 Hrs  T(C): 36.8 (28 Jun 2019 05:17), Max: 37 (27 Jun 2019 20:30)  T(F): 98.2 (28 Jun 2019 05:17), Max: 98.6 (27 Jun 2019 20:30)  HR: 105 (28 Jun 2019 05:17) (96 - 105)  BP: 127/81 (28 Jun 2019 05:17) (99/62 - 127/81)  BP(mean): --  RR: 16 (28 Jun 2019 05:17) (16 - 17)  SpO2: 92% (28 Jun 2019 05:17) (90% - 94%)    I&O's Summary    27 Jun 2019 07:01  -  28 Jun 2019 07:00  --------------------------------------------------------  IN: 900 mL / OUT: 950 mL / NET: -50 mL          PHYSICAL EXAM:  TELE: Off tele   Constitutional: NAD, awake and alert, well-developed  HEENT: Moist Mucous Membranes, Anicteric  Pulmonary: Non-labored, breath sounds are clear bilaterally, No wheezing, crackles or rhonchi  Cardiovascular: Regular, S1 and S2 nl, No murmurs, rubs, gallops or clicks  Gastrointestinal: Bowel Sounds present, soft, nontender.   Lymph: No lymphadenopathy. No peripheral edema.  Skin: No visible rashes or ulcers.  Psych:  Mood & affect appropriate    LABS: All Labs Reviewed:                        11.6   7.06  )-----------( 403      ( 28 Jun 2019 08:29 )             32.9                         12.3   7.23  )-----------( 452      ( 27 Jun 2019 08:05 )             34.9                         12.3   6.17  )-----------( 385      ( 26 Jun 2019 07:48 )             35.5     28 Jun 2019 08:29    138    |  103    |  11     ----------------------------<  93     3.8     |  28     |  0.83   27 Jun 2019 08:05    134    |  97     |  10     ----------------------------<  118    3.5     |  29     |  1.10   26 Jun 2019 07:48    135    |  99     |  17     ----------------------------<  121    3.5     |  29     |  1.20     Ca    7.7        28 Jun 2019 08:29  Ca    7.9        27 Jun 2019 08:05  Ca    7.8        26 Jun 2019 07:48  Mg     1.9       28 Jun 2019 08:29  Mg     2.0       26 Jun 2019 07:48    TPro  5.6    /  Alb  2.2    /  TBili  0.3    /  DBili  x      /  AST  45     /  ALT  38     /  AlkPhos  52     27 Jun 2019 08:05  TPro  5.4    /  Alb  2.2    /  TBili  0.3    /  DBili  x      /  AST  44     /  ALT  36     /  AlkPhos  52     26 Jun 2019 07:48     from: Xray Chest 2 Views PA/Lat (06.21.19 @ 08:58) >    EXAM:  XR CHEST PA LAT 2V                          PROCEDURE DATE:  06/21/2019      INTERPRETATION:  Interval imaging study.    PA lateral. Prior 6/20/2019.    Impression: Very low lung volumes. No change in left basal retrocardiac   infiltrate/atelectasis small bilateral pleural effusions. Remainder study   also unchanged. No new abnormality.    LEORA MOORE M.D., ATTENDING RADIOLOGIST  This document has been electronically signed. Jun 21 2019 11:09AM     < end of copied text >    < from: 12 Lead ECG (06.20.19 @ 15:29) >    Ventricular Rate 106 BPM    Atrial Rate 106 BPM    P-R Interval 184 ms    QRS Duration 108 ms    Q-T Interval 342 ms    QTC Calculation(Bezet) 454 ms    P Axis 41 degrees    R Axis -58 degrees    T Axis 74 degrees    Diagnosis Line Sinus tachycardia  Left anterior fascicular block  Abnormal ECG  Confirmed by DEEPAK PRASAD ([id number]) on 6/21/2019 8:18:12 AM    < end of copied text >           Cody Egan [facility]   Cardiology

## 2019-06-28 NOTE — PROGRESS NOTE ADULT - SUBJECTIVE AND OBJECTIVE BOX
POD # 7  s/p right hemicolectomy with removal of terminal ileum and ileocolostomy for LBO    SUBJECTIVE:  89 y/o M examined at bedside with no acute overnight events. PT offers no complaints at this time in NAD. Pt currently tolerating CLD and states passed a "large" bowel movement and is passing flatus. Patient denies any fevers, chills, chest pain, shortness of breath, abdominal pain, nausea, vomiting or diarrhea.    Vital Signs Last 24 Hrs  T(C): 36.8 (28 Jun 2019 05:17), Max: 37 (27 Jun 2019 20:30)  T(F): 98.2 (28 Jun 2019 05:17), Max: 98.6 (27 Jun 2019 20:30)  HR: 105 (28 Jun 2019 05:17) (96 - 105)  BP: 127/81 (28 Jun 2019 05:17) (99/62 - 127/81)  BP(mean): --  RR: 16 (28 Jun 2019 05:17) (16 - 17)  SpO2: 92% (28 Jun 2019 05:17) (90% - 94%)    PHYSICAL EXAM:  GENERAL: No acute distress  CHEST/LUNG: CTABL, no ronchi, rales, or wheezing  HEART: RRR, no MRG  ABDOMEN: Soft, non-tender, non-distended, normoactive bowel sounds throughout all 4 quadrants   NEUROLOGY: A&O x 3, no focal deficits    I&O's Summary    27 Jun 2019 07:01  -  28 Jun 2019 07:00  --------------------------------------------------------  IN: 900 mL / OUT: 950 mL / NET: -50 mL      I&O's Detail    27 Jun 2019 07:01  -  28 Jun 2019 07:00  --------------------------------------------------------  IN:    sodium chloride 0.9% with potassium chloride 20 mEq/L: 900 mL  Total IN: 900 mL    OUT:    Voided: 950 mL  Total OUT: 950 mL    Total NET: -50 mL        MEDICATIONS  (STANDING):  atorvastatin 80 milliGRAM(s) Oral at bedtime  docusate sodium 100 milliGRAM(s) Oral three times a day  enoxaparin Injectable 40 milliGRAM(s) SubCutaneous daily  finasteride 5 milliGRAM(s) Oral daily  lisinopril 2.5 milliGRAM(s) Oral daily  metoprolol succinate ER 50 milliGRAM(s) Oral daily  NIFEdipine XL 60 milliGRAM(s) Oral daily  nystatin Powder 1 Application(s) Topical three times a day  pantoprazole  Injectable 40 milliGRAM(s) IV Push daily  phenazopyridine 100 milliGRAM(s) Oral every 8 hours  sodium chloride 0.9% with potassium chloride 20 mEq/L 1000 milliLiter(s) (75 mL/Hr) IV Continuous <Continuous>  tamsulosin 0.4 milliGRAM(s) Oral two times a day    MEDICATIONS  (PRN):  acetaminophen   Tablet .. 650 milliGRAM(s) Oral every 6 hours PRN Temp greater or equal to 38.5C (101.3F), Mild Pain (1 - 3)  simethicone 80 milliGRAM(s) Chew three times a day PRN Gas    LABS:                        11.6   7.06  )-----------( 403      ( 28 Jun 2019 08:29 )             32.9     06-28    138  |  103  |  11  ----------------------------<  93  3.8   |  28  |  0.83    Ca    7.7<L>      28 Jun 2019 08:29  Mg     1.9     06-28    TPro  5.6<L>  /  Alb  2.2<L>  /  TBili  0.3  /  DBili  x   /  AST  45<H>  /  ALT  38  /  AlkPhos  52  06-27        ASSESSMENT  89 y/o M POD #7 s/p right hemicolectomy for LBO, passing flatus with bowel movement today,    PLAN  - Adv to FLD, f/u diet tolerance  - pain control, supportive care  - OOB, ambulation as tolerated  - serial abdominal exams  - Case discussed with Dr. Tse    Surgical Team Contact Information  Spectralink: Ext: [phone] or [phone]  Pager: [phone]

## 2019-06-28 NOTE — PROGRESS NOTE ADULT - ASSESSMENT
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  - Tolerated procedure well   - Can hold aspirin until cleared by surg  - Pain control    CAD   - Stable  - Can hold aspirin until cleared by surgery, but please resume when able given CABG  - Continue BB  - will start lisinopril 2.5 mg daily with hold parameters   - Continue Statin  - Monitor and replete lytes as needed    HTN  - cw lisinopril 2.5 mg daily with hold parameters     - Continue BB/ nifedipine  - Monitor routine hemodynamics.     DVT ppx  - Per Primary    - To follow closely with you    Cody Egan [facility]  Cardiology   Spectra #[phone]

## 2019-06-28 NOTE — PROGRESS NOTE ADULT - ASSESSMENT
89M w/pmh of CAD s/p CABG (1995), b/l inguinal hernia repairs, enlarged prostate s/p TURP x2, HTN and HLD presenting with abdominal pain for 4 days. Admitted to [facility] with cecal volvulus,  found to have Ischemic and obstructed right cecum/ colon; s/p Right hemicolectomy with removal of terminal, ileum and ileocolostomy POD#7

## 2019-06-29 ENCOUNTER — TRANSCRIPTION ENCOUNTER (OUTPATIENT)
Age: 84
End: 2019-06-29

## 2019-06-29 LAB
ANION GAP SERPL CALC-SCNC: 7 MMOL/L — SIGNIFICANT CHANGE UP (ref 5–17)
BUN SERPL-MCNC: 11 MG/DL — SIGNIFICANT CHANGE UP (ref 7–23)
CALCIUM SERPL-MCNC: 7.6 MG/DL — LOW (ref 8.5–10.1)
CHLORIDE SERPL-SCNC: 103 MMOL/L — SIGNIFICANT CHANGE UP (ref 96–108)
CO2 SERPL-SCNC: 27 MMOL/L — SIGNIFICANT CHANGE UP (ref 22–31)
CREAT SERPL-MCNC: 0.73 MG/DL — SIGNIFICANT CHANGE UP (ref 0.5–1.3)
GLUCOSE SERPL-MCNC: 91 MG/DL — SIGNIFICANT CHANGE UP (ref 70–99)
HCT VFR BLD CALC: 30.6 % — LOW (ref 39–50)
HGB BLD-MCNC: 10.8 G/DL — LOW (ref 13–17)
MCHC RBC-ENTMCNC: 32.7 PG — SIGNIFICANT CHANGE UP (ref 27–34)
MCHC RBC-ENTMCNC: 35.3 GM/DL — SIGNIFICANT CHANGE UP (ref 32–36)
MCV RBC AUTO: 92.7 FL — SIGNIFICANT CHANGE UP (ref 80–100)
NRBC # BLD: 0 /100 WBCS — SIGNIFICANT CHANGE UP (ref 0–0)
PLATELET # BLD AUTO: 416 K/UL — HIGH (ref 150–400)
POTASSIUM SERPL-MCNC: 3.8 MMOL/L — SIGNIFICANT CHANGE UP (ref 3.5–5.3)
POTASSIUM SERPL-SCNC: 3.8 MMOL/L — SIGNIFICANT CHANGE UP (ref 3.5–5.3)
RBC # BLD: 3.3 M/UL — LOW (ref 4.2–5.8)
RBC # FLD: 13.9 % — SIGNIFICANT CHANGE UP (ref 10.3–14.5)
SODIUM SERPL-SCNC: 137 MMOL/L — SIGNIFICANT CHANGE UP (ref 135–145)
WBC # BLD: 6.77 K/UL — SIGNIFICANT CHANGE UP (ref 3.8–10.5)
WBC # FLD AUTO: 6.77 K/UL — SIGNIFICANT CHANGE UP (ref 3.8–10.5)

## 2019-06-29 PROCEDURE — 99233 SBSQ HOSP IP/OBS HIGH 50: CPT

## 2019-06-29 PROCEDURE — 99232 SBSQ HOSP IP/OBS MODERATE 35: CPT

## 2019-06-29 PROCEDURE — 99024 POSTOP FOLLOW-UP VISIT: CPT

## 2019-06-29 RX ORDER — KETOROLAC TROMETHAMINE 30 MG/ML
15 SYRINGE (ML) INJECTION ONCE
Refills: 0 | Status: DISCONTINUED | OUTPATIENT
Start: 2019-06-29 | End: 2019-06-29

## 2019-06-29 RX ADMIN — DEXTROSE MONOHYDRATE, SODIUM CHLORIDE, AND POTASSIUM CHLORIDE 75 MILLILITER(S): 50; .745; 4.5 INJECTION, SOLUTION INTRAVENOUS at 05:55

## 2019-06-29 RX ADMIN — Medication 15 MILLIGRAM(S): at 06:11

## 2019-06-29 RX ADMIN — Medication 15 MILLIGRAM(S): at 05:56

## 2019-06-29 RX ADMIN — LISINOPRIL 2.5 MILLIGRAM(S): 2.5 TABLET ORAL at 05:56

## 2019-06-29 RX ADMIN — Medication 100 MILLIGRAM(S): at 05:56

## 2019-06-29 RX ADMIN — Medication 650 MILLIGRAM(S): at 12:26

## 2019-06-29 RX ADMIN — ENOXAPARIN SODIUM 40 MILLIGRAM(S): 100 INJECTION SUBCUTANEOUS at 12:26

## 2019-06-29 RX ADMIN — TAMSULOSIN HYDROCHLORIDE 0.4 MILLIGRAM(S): 0.4 CAPSULE ORAL at 17:21

## 2019-06-29 RX ADMIN — Medication 650 MILLIGRAM(S): at 04:56

## 2019-06-29 RX ADMIN — Medication 50 MILLIGRAM(S): at 05:56

## 2019-06-29 RX ADMIN — Medication 650 MILLIGRAM(S): at 21:04

## 2019-06-29 RX ADMIN — Medication 650 MILLIGRAM(S): at 13:30

## 2019-06-29 RX ADMIN — Medication 650 MILLIGRAM(S): at 05:56

## 2019-06-29 RX ADMIN — TAMSULOSIN HYDROCHLORIDE 0.4 MILLIGRAM(S): 0.4 CAPSULE ORAL at 05:56

## 2019-06-29 RX ADMIN — Medication 60 MILLIGRAM(S): at 05:56

## 2019-06-29 RX ADMIN — NYSTATIN CREAM 1 APPLICATION(S): 100000 CREAM TOPICAL at 21:04

## 2019-06-29 RX ADMIN — PANTOPRAZOLE SODIUM 40 MILLIGRAM(S): 20 TABLET, DELAYED RELEASE ORAL at 12:26

## 2019-06-29 RX ADMIN — NYSTATIN CREAM 1 APPLICATION(S): 100000 CREAM TOPICAL at 05:56

## 2019-06-29 RX ADMIN — FINASTERIDE 5 MILLIGRAM(S): 5 TABLET, FILM COATED ORAL at 12:31

## 2019-06-29 RX ADMIN — ATORVASTATIN CALCIUM 80 MILLIGRAM(S): 80 TABLET, FILM COATED ORAL at 21:04

## 2019-06-29 RX ADMIN — Medication 100 MILLIGRAM(S): at 21:04

## 2019-06-29 RX ADMIN — Medication 650 MILLIGRAM(S): at 22:04

## 2019-06-29 RX ADMIN — DEXTROSE MONOHYDRATE, SODIUM CHLORIDE, AND POTASSIUM CHLORIDE 75 MILLILITER(S): 50; .745; 4.5 INJECTION, SOLUTION INTRAVENOUS at 21:04

## 2019-06-29 RX ADMIN — NYSTATIN CREAM 1 APPLICATION(S): 100000 CREAM TOPICAL at 17:20

## 2019-06-29 RX ADMIN — Medication 100 MILLIGRAM(S): at 17:20

## 2019-06-29 NOTE — PROGRESS NOTE ADULT - PROBLEM SELECTOR PLAN 4
CT A/P showed small right upper quadrant ascites.  - Consider CHF as proBNP elevated 758  - Check ECHO inpt vs outpt as pt stable
CT A/P showed small right upper quadrant ascites.  - Consider CHF as proBNP elevated 758  - Check ECHO
CT A/P showed small right upper quadrant ascites.  - Consider CHF as proBNP elevated 758  - Check ECHO inpt vs outpt as pt stable
CT A/P showed small right upper quadrant ascites.  - Consider CHF as proBNP elevated 758  - Check ECHO
CT A/P showed small right upper quadrant ascites.  - Consider CHF as proBNP elevated 758  - Check ECHO inpt vs outpt as pt stable
CT A/P showed small right upper quadrant ascites.  - Consider CHF as proBNP elevated 758  - Check ECHO inpt vs outpt as pt stable

## 2019-06-29 NOTE — PROGRESS NOTE ADULT - PROBLEM SELECTOR PROBLEM 5
Pleural effusion

## 2019-06-29 NOTE — PROGRESS NOTE ADULT - ASSESSMENT
89M w/pmh of CAD s/p CABG (1995), b/l inguinal hernia repairs, enlarged prostate s/p TURP x2, HTN and HLD presenting with abdominal pain for 4 days. Admitted to [facility] with cecal volvulus,  found to have Ischemic and obstructed right cecum/ colon; s/p Right hemicolectomy with removal of terminal, ileum and ileocolostomy POD#8

## 2019-06-29 NOTE — PROGRESS NOTE ADULT - SUBJECTIVE AND OBJECTIVE BOX
Patient is a 89y old  Male who presents with a chief complaint of abdominal pain (28 Jun 2019 10:58)      INTERVAL HPI: Pt seen and examined. States he was having alot of suprapubic discomfort and urethral discomfort despite pyridium. Pt is requesting sitz bathes as this helps when he has these types of symptoms in the past.  Denies any acute other complaints at this time.     OVERNIGHT EVENTS: wade inserted unlikely indicated as was only having 250cc, remove wade now  T(F): 97.9 (06-29-19 @ 05:56), Max: 97.9 (06-29-19 @ 05:56)  HR: 96 (06-29-19 @ 05:56) (96 - 98)  BP: 148/72 (06-29-19 @ 05:56) (107/72 - 148/72)  RR: 16 (06-29-19 @ 05:56) (16 - 17)  SpO2: 93% (06-29-19 @ 05:56) (92% - 93%)  I&O's Summary    28 Jun 2019 07:01  -  29 Jun 2019 07:00  --------------------------------------------------------  IN: 900 mL / OUT: 1250 mL / NET: -350 mL        REVIEW OF SYSTEMS:  CONSTITUTIONAL: No fever, weight loss, or fatigue  RESPIRATORY: No cough, wheezing, chills or hemoptysis; No shortness of breath  CARDIOVASCULAR: No chest pain, palpitations, dizziness, or leg swelling  GASTROINTESTINAL: No abdominal or epigastric pain. No nausea, vomiting, or hematemesis; No diarrhea or constipation. No melena or hematochezia.  GENITOURINARY: No dysuria, frequency, hematuria, or incontinence except urehtral discomfort and suprapubic discomfort  NEUROLOGICAL: No headaches, memory loss, loss of strength, numbness, or tremors  ENDOCRINE: No heat or cold intolerance; No hair loss  MUSCULOSKELETAL: No joint pain or swelling; No muscle, back, or extremity pain  PSYCHIATRIC: No depression, anxiety, mood swings, or difficulty sleeping      PHYSICAL EXAM:  GENERAL: NAD, well-groomed, elder  NERVOUS SYSTEM:  Alert & Oriented X3, Good concentration; Motor Strength 4/5 B/L upper and lower extremities  CHEST/LUNG: Clear to percussion bilaterally; No rales, rhonchi, wheezing, or rubs  HEART: Regular rate and rhythm; No murmurs, rubs, or gallops  ABDOMEN: Soft, Nontender, Nondistended; Bowel sounds present  EXTREMITIES:  2+ Peripheral Pulses, No clubbing, cyanosis, or edema  SKIN: No rashes or lesions except abd midline lesion healing well    LABS:                        10.8   6.77  )-----------( 416      ( 29 Jun 2019 08:43 )             30.6     06-29    137  |  103  |  11  ----------------------------<  91  3.8   |  27  |  0.73    Ca    7.6<L>      29 Jun 2019 08:43  Mg     1.9     06-28          CAPILLARY BLOOD GLUCOSE          06-26 @ 11:15   No growth  --  --          MEDICATIONS  (STANDING):  atorvastatin 80 milliGRAM(s) Oral at bedtime  docusate sodium 100 milliGRAM(s) Oral three times a day  enoxaparin Injectable 40 milliGRAM(s) SubCutaneous daily  finasteride 5 milliGRAM(s) Oral daily  lisinopril 2.5 milliGRAM(s) Oral daily  metoprolol succinate ER 50 milliGRAM(s) Oral daily  NIFEdipine XL 60 milliGRAM(s) Oral daily  nystatin Powder 1 Application(s) Topical three times a day  pantoprazole  Injectable 40 milliGRAM(s) IV Push daily  phenazopyridine 100 milliGRAM(s) Oral every 8 hours  sodium chloride 0.9% with potassium chloride 20 mEq/L 1000 milliLiter(s) (75 mL/Hr) IV Continuous <Continuous>  tamsulosin 0.4 milliGRAM(s) Oral two times a day    MEDICATIONS  (PRN):  acetaminophen   Tablet .. 650 milliGRAM(s) Oral every 6 hours PRN Temp greater or equal to 38.5C (101.3F), Mild Pain (1 - 3)  simethicone 80 milliGRAM(s) Chew three times a day PRN Gas

## 2019-06-29 NOTE — CONSULT NOTE ADULT - ASSESSMENT
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  - Can hold aspirin safely from cardiac point of view  - Continue BB and statin at current dose  - Continue nifedipine  - Monitor and replete lytes as needed  - To follow closely with you
Pt has a false sensation of bladder fullness, likely from general abdominal distention from ileus.  d/c pyridium  Continue Flomax 0.8 and Proscar 5  No need for wade  outpatient f/u with Dr. Martinez Cruz

## 2019-06-29 NOTE — CONSULT NOTE ADULT - SUBJECTIVE AND OBJECTIVE BOX
[facility] - Nicole Mayer, Radha, Zachery, Lucinda, Jen Payne  Office Number: [phone]    Initial Consult Note    CHIEF COMPLAINT: Patient is a 89y old  Male who presents with a chief complaint of abdominal pain      HPI:  89M w/pmh of CAD s/p CABG (1995), b/l inguinal hernia repairs, enlarged prostate s/p TURP x2, HTN and HLD presenting with abdominal pain for 4 days. States that after dinner on Sunday, he had a sharp, periumbilical pain with associated nausea and decreased appetite. He thought that he had eaten something that upset his stomach. He takes Tylenol at night for prostate discomfort. Over the past few days, his abdominal pain has improved to a dull ache, 3/10 in severity, nonradiating located around umbilicus. He had constipation up until this morning when he had a large bm after a fleet enema. He is able to pass gas.  Denies fevers/chills, chest pain, palpitations, sob, dysuria, edema or weakness. Patient's last colonoscopy was 7 years ago, wnl.     In the ED, patient's vitals were: T98.5F, , /72, RR 14 and SpO2 95% on room air. Significant labs include: H/H 12.1/35.5, Na 129, trops neg x1, pro-. UA wnl. Pt received Azithromycin and Ceftriaxone.   CT A/P: he cecum is dilated and folded on itself in the right upper quadrant, changed in location from prior CT. There is wall thickening involving the terminal ileum, cecum and proximal ascending colon. Appendix is distended and air-filled measuring up to 1.3 cm at the tip.   CXR: No change heart mediastinum. Asymmetric elevation right hemidiaphragm. There is left basilar retrocardiac atelectasis/infiltrate. Correlate for active infection. Small left pleural effusion. Distended bowel visualized upper abdomen.  EKG: sinus tachycardia, , no ST elevation/depression     Patient reports that he walks 3-4 miles per day.  He never has chest pain or increased dyspnea.  He gets annual nuclear stress testing and echocardiography by his cardiologist, and all has been within  normal limits.  His last study was in July of 2018.  He denies chest pain, dyspnea, PND, orthopnea, LE swelling, dizziness, or syncope.        PAST MEDICAL & SURGICAL HISTORY:  CAD (coronary artery disease): s/p CABG, 1995  Anginal pain  HTN (hypertension)  Enlarged prostate  H/O inguinal hernia repair: bilateral  S/P TURP: x2      SOCIAL HISTORY:  No tobacco, ethanol, or drug abuse.    FAMILY HISTORY:  No pertinent family history in first degree relatives  No family history of acute MI or sudden cardiac death.    MEDICATIONS  (STANDING):  atorvastatin 80 milliGRAM(s) Oral at bedtime  finasteride 5 milliGRAM(s) Oral daily  metoprolol succinate ER 50 milliGRAM(s) Oral daily  NIFEdipine XL 60 milliGRAM(s) Oral daily  sodium chloride 0.9%. 1000 milliLiter(s) (80 mL/Hr) IV Continuous <Continuous>  tamsulosin 0.4 milliGRAM(s) Oral at bedtime    MEDICATIONS  (PRN):  acetaminophen   Tablet .. 650 milliGRAM(s) Oral every 6 hours PRN Temp greater or equal to 38C (100.4F), Mild Pain (1 - 3)  morphine  - Injectable 1 milliGRAM(s) IV Push every 6 hours PRN Moderate Pain (4 - 6)  ondansetron Injectable 4 milliGRAM(s) IV Push every 6 hours PRN Nausea and/or Vomiting      Allergies    No Known Allergies            REVIEW OF SYSTEMS:    All other review of systems is negative unless indicated above    VITAL SIGNS:   Vital Signs Last 24 Hrs  T(C): 36.8 (21 Jun 2019 04:53), Max: 37.4 (20 Jun 2019 23:04)  T(F): 98.2 (21 Jun 2019 04:53), Max: 99.3 (20 Jun 2019 23:04)  HR: 102 (21 Jun 2019 04:53) (72 - 118)  BP: 125/58 (21 Jun 2019 04:53) (117/70 - 132/71)  BP(mean): --  RR: 17 (21 Jun 2019 04:53) (14 - 17)  SpO2: 91% (21 Jun 2019 04:53) (91% - 95%)    I&O's Summary      On Exam:    Constitutional: NAD, alert and oriented x 3  Lungs:  Non-labored, breath sounds are clear bilaterally, No wheezing, rales or rhonchi  Cardiovascular: RRR.  S1 and S2 positive.  No murmurs, rubs, gallops or clicks  Gastrointestinal: Bowel Sounds present, soft, nontender.   Lymph: No peripheral edema. No cervical lymphadenopathy.  Neurological: Alert, no focal deficits  Skin: No rashes or ulcers   Psych:  Mood & affect appropriate.    LABS: All Labs Reviewed:                        11.2   6.90  )-----------( 230      ( 21 Jun 2019 07:54 )             32.2                         12.1   10.29 )-----------( 234      ( 20 Jun 2019 15:34 )             35.5     21 Jun 2019 07:54    136    |  100    |  28     ----------------------------<  98     3.9     |  27     |  1.00   20 Jun 2019 15:34    129    |  94     |  37     ----------------------------<  107    4.2     |  27     |  1.10     Ca    8.4        21 Jun 2019 07:54  Ca    8.8        20 Jun 2019 15:34    TPro  6.5    /  Alb  2.7    /  TBili  0.5    /  DBili  x      /  AST  45     /  ALT  50     /  AlkPhos  54     21 Jun 2019 07:54  TPro  7.3    /  Alb  3.2    /  TBili  0.6    /  DBili  x      /  AST  48     /  ALT  52     /  AlkPhos  64     20 Jun 2019 15:34    PT/INR - ( 20 Jun 2019 15:34 )   PT: 11.5 sec;   INR: 1.01 ratio         PTT - ( 20 Jun 2019 15:34 )  PTT:25.9 sec  CARDIAC MARKERS ( 20 Jun 2019 15:34 )  <.015 ng/mL / x     / x     / x     / 5.0 ng/mL      Blood Culture:   06-20 @ 15:34  Pro Bnp 758        RADIOLOGY:      EKG:  NSR.  LAFB
Patient is a 89y old  Male who presents with a chief complaint of abdominal pain (29 Jun 2019 13:06)      HISTORY OF PRESENT ILLNESS:  89 y/o male s/p right hemicolectomy.  Pt c/o slow urinary flow and sensation of fullness.  Placed on Pyridium w/o improvement.  Pt s/p TURP x 2 in the past, with Dr. Hope and then Dr. Merritt.  On Flomax 0.4mg and Proscar at home (current urologist is Dr. Martinez Cruz).  Flomax increased to BID by house staff.  Wade was placed and removed last night.    PAST MEDICAL & SURGICAL HISTORY:  CAD (coronary artery disease): s/p CABG, 1995  Anginal pain  HTN (hypertension)  Enlarged prostate  H/O inguinal hernia repair: bilateral  S/P TURP: x2      REVIEW OF SYSTEMS:    CONSTITUTIONAL: No weakness, fevers or chills  SKIN: No itching, burning, rashes, or lesions   EYES/ENT: No visual changes;  No vertigo or throat pain   NECK: No pain or stiffness  RESPIRATORY: No cough, wheezing, hemoptysis; No shortness of breath  CARDIOVASCULAR: No chest pain or palpitations  GASTROINTESTINAL: No abdominal or epigastric pain. No nausea, vomiting, diarrhea  NEUROLOGICAL: No numbness or weakness  GENITOURINARY:     No hematuria, dysuria, incontinence.  (+)suprapubic fullness (+)slow flow    All other review of systems is negative unless indicated above.    MEDICATIONS  (STANDING):  atorvastatin 80 milliGRAM(s) Oral at bedtime  docusate sodium 100 milliGRAM(s) Oral three times a day  enoxaparin Injectable 40 milliGRAM(s) SubCutaneous daily  finasteride 5 milliGRAM(s) Oral daily  lisinopril 2.5 milliGRAM(s) Oral daily  metoprolol succinate ER 50 milliGRAM(s) Oral daily  NIFEdipine XL 60 milliGRAM(s) Oral daily  nystatin Powder 1 Application(s) Topical three times a day  pantoprazole  Injectable 40 milliGRAM(s) IV Push daily  phenazopyridine 100 milliGRAM(s) Oral every 8 hours  sodium chloride 0.9% with potassium chloride 20 mEq/L 1000 milliLiter(s) (75 mL/Hr) IV Continuous <Continuous>  tamsulosin 0.4 milliGRAM(s) Oral two times a day    MEDICATIONS  (PRN):  acetaminophen   Tablet .. 650 milliGRAM(s) Oral every 6 hours PRN Temp greater or equal to 38.5C (101.3F), Mild Pain (1 - 3)  simethicone 80 milliGRAM(s) Chew three times a day PRN Gas      Allergies    No Known Allergies      SOCIAL HISTORY:   Smoking: never   EtOH: moderate   Work: former       FAMILY HISTORY:  No pertinent family history in first degree relatives      Vital Signs Last 24 Hrs  T(C): 36.8 (29 Jun 2019 13:32), Max: 36.8 (29 Jun 2019 13:32)  T(F): 98.3 (29 Jun 2019 13:32), Max: 98.3 (29 Jun 2019 13:32)  HR: 93 (29 Jun 2019 13:32) (93 - 98)  BP: 105/68 (29 Jun 2019 13:32) (105/68 - 148/72)  BP(mean): --  RR: 18 (29 Jun 2019 13:32) (16 - 18)  SpO2: 94% (29 Jun 2019 13:32) (92% - 94%)    PHYSICAL EXAM:    Constitutional: NAD, well-developed  HEENT: PERRLA, EOMI  Neck: Supple, full ROM  Back: No CVA tenderness  Respiratory: Normal repiratory effort  Cardiovascular: RRR  Abd: staples intact.  no signs of infection (+) suprapubic fullness  : Normal phallus,open meatus,bilateral descended testes, non-tender  AUNDREA: prostate smooth, non-tender, not enlarged  Extremities: No peripheral edema  Neurological: A&O x 3, no focal deficits  Psychiatric: Normal mood, normal affect  Musculoskeletal: no clubbing/cyanosis/edema  Skin: No rashes    LABS:                        10.8   6.77  )-----------( 416      ( 29 Jun 2019 08:43 )             30.6     06-29    137  |  103  |  11  ----------------------------<  91  3.8   |  27  |  0.73    Ca    7.6<L>      29 Jun 2019 08:43  Mg     1.9     06-28        16 Fr wade was placed; only drained 30 cc of pyridium stained urine.  Wade was irrigated with 60 cc sterile water: irrigated freely, with no clots.  Wade removed.

## 2019-06-29 NOTE — PROGRESS NOTE ADULT - NSHPATTENDINGPLANDISCUSS_GEN_ALL_CORE
patient and wife in detail, Surgical PA's and today's RN.
patient in detail, Surgical PA and tonight's RN.
patient in detail, Surgical PA and kajal's/ tonight's RN.
patient with wife in detail, Surgical PA, Hospitalist attending at time of admission, Cardiology attending today and today's RN.
wife at bedside, JOSEPHINE Dickinson, gen surg covering Dr. Rooney
Dr Coleman gen surg team, [facility] team,
JOSEPHINE Edmond
Yeny RN, [address] [place] [address]

## 2019-06-29 NOTE — PROGRESS NOTE ADULT - PROBLEM SELECTOR PLAN 8
chronic, sec to bph  Continue Finasteride 5mg qd and flomax
chronic, sec to bph  Continue Finasteride 5mg qd and flomax
Continue Finasteride 5mg qd
chronic, sec to bph  Continue Finasteride 5mg qd and flomax
Continue Finasteride 5mg qd
chronic, sec to bph  Continue Finasteride 5mg qd and flomax 0.4mg bid

## 2019-06-29 NOTE — PROGRESS NOTE ADULT - PROBLEM SELECTOR PLAN 1
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 QTc 454  - Pain management with Tylenol and morphine prn  - Surgical consult- Dr. Tse  consulted gi
secondary to Ischemic and obstructed right cecum/ colon., underRight hemicolectomy with removal of terminal, ileum and ileocolostomy tolerated proecdure well   - Zofran prn nausea. QTc 454  - Pain management with Tylenol and morphine prn  NPO  iv fluids
secondary to Ischemic and obstructed right cecum/ colon., underRight hemicolectomy with removal of terminal, ileum and ileocolostomy tolerated proecdure well   get of bed to chair  PT mary
secondary to Ischemic and obstructed right cecum/ colon., underRight hemicolectomy with removal of terminal, ileum and ileocolostomy tolerated proecdure well , now tolerating clears  wade removed, attempt TOV  advance to full per surgery
secondary to Ischemic and obstructed right cecum/ colon., underRight hemicolectomy with removal of terminal, ileum and ileocolostomy tolerated proecdure well , now tolerating full liq diet  advance diet as  per surgery
secondary to Ischemic and obstructed right cecum/ colon., underRight hemicolectomy with removal of terminal, ileum and ileocolostomy tolerated proecdure well   get of bed to chair  PT eval  resume diet per surgery
acute, resolving  secondary to Ischemic and obstructed right cecum/ colon., underRight hemicolectomy with removal of terminal, ileum and ileocolostomy tolerated proecdure well ,   tolerating clears advanced to fld as per surg   dc planning once po regular tolerated and optimized by surgery team
secondary to Ischemic and obstructed right cecum/ colon., underRight hemicolectomy with removal of terminal, ileum and ileocolostomy tolerated proecdure well ,   tolerating clears advanced to fld as per surg   dc planning once po regular and optimized by surgery team
secondary to Ischemic and obstructed right cecum/ colon., underRight hemicolectomy with removal of terminal, ileum and ileocolostomy tolerated proecdure well , npo this am, advance as per surgery, axr repeat as ordered by surgery, follow up  advance diet as  per surgery

## 2019-06-29 NOTE — PROGRESS NOTE ADULT - PROBLEM SELECTOR PROBLEM 2
Hyponatremia
Urinary retention with incomplete bladder emptying
Hyponatremia
Urinary retention with incomplete bladder emptying

## 2019-06-29 NOTE — PROGRESS NOTE ADULT - PROBLEM SELECTOR PLAN 7
chronic stable  Continue Atorvastatin 80mg qhs
chronic stable  Continue Atorvastatin 80mg qhs
Continue Atorvastatin 80mg qhs
chronic stable  Continue Atorvastatin 80mg qhs

## 2019-06-29 NOTE — PROGRESS NOTE ADULT - PROBLEM SELECTOR PLAN 2
-acute  -sec to clinical course  -tov, bladder scan if >1000ml then straight cath and consult urology  -cont flomax  -encourage ambulation as tolerated
resolved
-acute, stable, now with worsening urethral discomfort  -tov successful, wade inserted overnight dc immediately as only retained 250cc not indicated  -apprec uro recs Dr. Gutierrez  -cont flomax 0.4mg bid, change to daily on discharge  -encourage ambulation as tolerated
-acute, improved  -tov successful  -cont flomax 0.4mg bid, change to daily on discharge  -encourage ambulation as tolerated
-acute, improved  -tov successful  -cont flomax 0.4mg bid  -encourage ambulation as tolerated

## 2019-06-29 NOTE — PROGRESS NOTE ADULT - PROBLEM SELECTOR PLAN 6
Stable  - Continue Toprol 50mg qd  - Continue Procardia XL 60mg qd

## 2019-06-29 NOTE — DISCHARGE NOTE NURSING/CASE MANAGEMENT/SOCIAL WORK - NSDCDPATPORTLINK_GEN_ALL_CORE
You can access the [facility] Portal, offered by [facility], by registering with the following website: [email or web address]

## 2019-06-29 NOTE — PROGRESS NOTE ADULT - ASSESSMENT
consulted.  Weaver to be removed this morning.  Advance to regular diet.  Stable from surgical standpoint.  Incision looks good.  no signs of infection.  Staple line intact.  Abdomen remains soft and pt having regular bowel movements.  Discharge per medicine once urological issues addressed.

## 2019-06-29 NOTE — PROGRESS NOTE ADULT - SUBJECTIVE AND OBJECTIVE BOX
s/p Right moses colectomy for cecal bascule/ischemia.  Urinary retention.  Weaver catheter was replaced last night.  Urology consulted.  Tolerating full liquids, requesting regular food.    Vital Signs Last 24 Hrs  T(C): 36.6 (29 Jun 2019 05:56), Max: 36.6 (29 Jun 2019 05:56)  T(F): 97.9 (29 Jun 2019 05:56), Max: 97.9 (29 Jun 2019 05:56)  HR: 96 (29 Jun 2019 05:56) (96 - 98)  BP: 148/72 (29 Jun 2019 05:56) (107/72 - 148/72)  BP(mean): --  RR: 16 (29 Jun 2019 05:56) (16 - 17)  SpO2: 93% (29 Jun 2019 05:56) (92% - 93%)    06-28 @ 07:01  -  06-29 @ 07:00  --------------------------------------------------------  IN: 900 mL / OUT: 1250 mL / NET: -350 mL                              10.8   6.77  )-----------( 416      ( 29 Jun 2019 08:43 )             30.6                         11.6   7.06  )-----------( 403      ( 28 Jun 2019 08:29 )             32.9                         12.3   7.23  )-----------( 452      ( 27 Jun 2019 08:05 )             34.9       06-29    137  |  103  |  11  ----------------------------<  91  3.8   |  27  |  0.73    Ca    7.6<L>      29 Jun 2019 08:43  Mg     1.9     06-28        Physical Exam:  Awake alert and oriented x3 in no acute distress. NCAT, PERRLA, EOMI  Lungs clear bilaterally without wheezes rhonchi or rales.  Regular rate and rhythm  Abdomen soft, nontender, nondistended, positive bowel sounds in all 4 quadrants. No evidence of hernia or masses. Surgical incisions remained well approximated and healing appropriately without erythema, induration or fluctuance. Dressings remained clean dry and intact  Extremities without edema or tenderness.

## 2019-06-29 NOTE — PROGRESS NOTE ADULT - SUBJECTIVE AND OBJECTIVE BOX
[facility] -- Nicole Mayer, Radha, Zachery, Elmer Prasad Savella  Office # [phone]      Follow Up:  Preop clearance/Post op f/u    Subjective/Observations: Seen and examined.  Sitting in chair.  Events noted.  Pt with urinary obstruction with wade placed overnight.  Unable to tolerate due to ureteral discomfort and was removed.  Pt waiting to void.  Otherwise feeling well, tolerating diet.  He denies cp, sob or palpitations.  Pain controlled.  No signs of orthopnea or PND.        REVIEW OF SYSTEMS: All other review of systems is negative unless indicated above    PAST MEDICAL & SURGICAL HISTORY:  CAD (coronary artery disease): s/p CABG, 1995  Anginal pain  HTN (hypertension)  Enlarged prostate  H/O inguinal hernia repair: bilateral  S/P TURP: x2      MEDICATIONS  (STANDING):  atorvastatin 80 milliGRAM(s) Oral at bedtime  docusate sodium 100 milliGRAM(s) Oral three times a day  enoxaparin Injectable 40 milliGRAM(s) SubCutaneous daily  finasteride 5 milliGRAM(s) Oral daily  lisinopril 2.5 milliGRAM(s) Oral daily  metoprolol succinate ER 50 milliGRAM(s) Oral daily  NIFEdipine XL 60 milliGRAM(s) Oral daily  nystatin Powder 1 Application(s) Topical three times a day  pantoprazole  Injectable 40 milliGRAM(s) IV Push daily  phenazopyridine 100 milliGRAM(s) Oral every 8 hours  sodium chloride 0.9% with potassium chloride 20 mEq/L 1000 milliLiter(s) (75 mL/Hr) IV Continuous <Continuous>  tamsulosin 0.4 milliGRAM(s) Oral two times a day    MEDICATIONS  (PRN):  acetaminophen   Tablet .. 650 milliGRAM(s) Oral every 6 hours PRN Temp greater or equal to 38.5C (101.3F), Mild Pain (1 - 3)  simethicone 80 milliGRAM(s) Chew three times a day PRN Gas      Allergies    No Known Allergies    Intolerances            Vital Signs Last 24 Hrs  T(C): 36.6 (29 Jun 2019 05:56), Max: 36.6 (29 Jun 2019 05:56)  T(F): 97.9 (29 Jun 2019 05:56), Max: 97.9 (29 Jun 2019 05:56)  HR: 96 (29 Jun 2019 05:56) (96 - 98)  BP: 148/72 (29 Jun 2019 05:56) (107/72 - 148/72)  BP(mean): --  RR: 16 (29 Jun 2019 05:56) (16 - 17)  SpO2: 93% (29 Jun 2019 05:56) (92% - 93%)    I&O's Summary    28 Jun 2019 07:01  -  29 Jun 2019 07:00  --------------------------------------------------------  IN: 900 mL / OUT: 1250 mL / NET: -350 mL          PHYSICAL EXAM:  TELE: No on tele  Constitutional: NAD, awake and alert, well-developed  HEENT: Moist Mucous Membranes, Anicteric  Pulmonary: Non-labored, breath sounds are clear bilaterally, No wheezing, rales or rhonchi  Cardiovascular: Regular, S1 and S2, No murmurs, rubs, gallops or clicks  Gastrointestinal: Bowel Sounds present, soft, nontender.  Midline stable line intact  Lymph: +1 pitting edema b/l lower extremities.  No lymphadenopathy.  Skin: No visible rashes or ulcers.  Psych:  Mood & affect appropriate    LABS: All Labs Reviewed:                        10.8   6.77  )-----------( 416      ( 29 Jun 2019 08:43 )             30.6                         11.6   7.06  )-----------( 403      ( 28 Jun 2019 08:29 )             32.9                         12.3   7.23  )-----------( 452      ( 27 Jun 2019 08:05 )             34.9     29 Jun 2019 08:43    137    |  103    |  11     ----------------------------<  91     3.8     |  27     |  0.73   28 Jun 2019 08:29    138    |  103    |  11     ----------------------------<  93     3.8     |  28     |  0.83   27 Jun 2019 08:05    134    |  97     |  10     ----------------------------<  118    3.5     |  29     |  1.10     Ca    7.6        29 Jun 2019 08:43  Ca    7.7        28 Jun 2019 08:29  Ca    7.9        27 Jun 2019 08:05  Mg     1.9       28 Jun 2019 08:29    TPro  5.6    /  Alb  2.2    /  TBili  0.3    /  DBili  x      /  AST  45     /  ALT  38     /  AlkPhos  52     27 Jun 2019 08:05    < from: 12 Lead ECG (06.20.19 @ 15:29) >  Ventricular Rate 106 BPM    Atrial Rate 106 BPM    P-R Interval 184 ms    QRS Duration 108 ms    Q-T Interval 342 ms    QTC Calculation(Bezet) 454 ms    P Axis 41 degrees    R Axis -58 degrees    T Axis 74 degrees    Diagnosis Line Sinus tachycardia  Left anterior fascicular block  Abnormal ECG  Confirmed by DEEPAK PRASAD ([id number]) on 6/21/2019 8:18:12 AM    < end of copied text >    < from: Xray Abdomen 1 View PORTABLE -Urgent (06.27.19 @ 08:59) >  EXAM:  XR ABDOMEN PORTABLE URGENT 1V                            PROCEDURE DATE:  06/27/2019          INTERPRETATION:  Follow-up.    AP portable supine. Prior 6/21/2019.    Postoperative staples project over the right lower abdomen and pelvis.   There is mild distention of small bowel loops. Gas noted in the colon and   rectum. Findings likely represent postoperative ileus. No definite   obstruction. Recommend continued follow-up as clinically indicated.    Impression: As above                LEORA MOORE M.D., ATTENDING RADIOLOGIST  This document has been electronically signed. Jun 27 2019 11:30AM        < end of copied text >    < from: Xray Chest 1 View- PORTABLE-Urgent (06.25.19 @ 10:46) >    EXAM:  XR CHEST PORTABLE URGENT 1V                            PROCEDURE DATE:  06/25/2019          INTERPRETATION:  AP semierect chest on June 25, 2019 at 10:25 AM. Patient   is 4 days postop right hemicolectomy.    Elevated diaphragms crowd the chest. Heart size cannot be assessed.   Sternotomy again noted.    Mild left base effusion noted possibly somewhat increased from June 21.   This may be associated with some left base atelectasis increasing as well.    IMPRESSION: Slightly increasing left base pleural pulmonary process.                  BRIGIDO BARNARD M.D., ATTENDING RADIOLOGIST  This document has been electronically signed. Jun 25 2019 10:57AM        < end of copied text >

## 2019-06-29 NOTE — PROGRESS NOTE ADULT - PROBLEM SELECTOR PLAN 5
CXR showed Small left pleural effusion.   - Pt asymptomatic, saturating well  - Continue to monitor
CXR showed Small left pleural effusion.   - Pt asymptomatic, saturating well  - Continue to monitor  - IVF for hypernatremia for 12 hours.
CXR showed Small left pleural effusion.   - Pt asymptomatic, saturating well  - Continue to monitor

## 2019-06-29 NOTE — PROGRESS NOTE ADULT - PROBLEM SELECTOR PLAN 3
s/p CABG, 1995  - will hold ASA in event of patient needs surgical procedure.   - Continue Toprol XL 50mg qd with hold parameters  - Cardio consult ([facility] group consulted) for optimization
s/p CABG, 1995  - will hold ASA until surgery cleared .   - Continue Toprol XL 50mg qd with hold parameters

## 2019-06-29 NOTE — PROGRESS NOTE ADULT - ASSESSMENT
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  - Tolerated procedure well   - Can hold aspirin until cleared by surg  - H/H trending down slowly, continue to monitor  - Pain control  - Enc I/S     CAD   - Stable  - Can hold aspirin until cleared by surgery, but please resume when able given CABG  - Continue BB  - Cont lisinopril 2.5 mg daily with hold parameters   - Continue Statin  - Monitor and replete lytes as needed    HTN  - c/w lisinopril 2.5 mg daily with hold parameters     - Continue BB/ nifedipine  - Monitor routine hemodynamics.     Urinary obstruction  - Weaver placed then removed 2/2 discomfort  - Waiting to void  - management per medicine    DVT ppx  - Per Primary    Lower extremity edema  - Consider Compression stockings while oob and legs dependent    - To follow closely with you    - D/C planning as per primary team    HELEN Bauer-BC  Cardiology

## 2019-06-29 NOTE — PROGRESS NOTE ADULT - PROBLEM SELECTOR PROBLEM 8
Enlarged prostate

## 2019-06-30 VITALS
DIASTOLIC BLOOD PRESSURE: 72 MMHG | SYSTOLIC BLOOD PRESSURE: 126 MMHG | TEMPERATURE: 98 F | HEART RATE: 92 BPM | RESPIRATION RATE: 19 BRPM | OXYGEN SATURATION: 93 %

## 2019-06-30 LAB
ANION GAP SERPL CALC-SCNC: 7 MMOL/L — SIGNIFICANT CHANGE UP (ref 5–17)
BUN SERPL-MCNC: 13 MG/DL — SIGNIFICANT CHANGE UP (ref 7–23)
C DIFF BY PCR RESULT: SIGNIFICANT CHANGE UP
C DIFF TOX GENS STL QL NAA+PROBE: SIGNIFICANT CHANGE UP
CALCIUM SERPL-MCNC: 7.6 MG/DL — LOW (ref 8.5–10.1)
CHLORIDE SERPL-SCNC: 103 MMOL/L — SIGNIFICANT CHANGE UP (ref 96–108)
CO2 SERPL-SCNC: 27 MMOL/L — SIGNIFICANT CHANGE UP (ref 22–31)
CREAT SERPL-MCNC: 0.81 MG/DL — SIGNIFICANT CHANGE UP (ref 0.5–1.3)
GLUCOSE SERPL-MCNC: 89 MG/DL — SIGNIFICANT CHANGE UP (ref 70–99)
HCT VFR BLD CALC: 31 % — LOW (ref 39–50)
HGB BLD-MCNC: 10.8 G/DL — LOW (ref 13–17)
MCHC RBC-ENTMCNC: 32 PG — SIGNIFICANT CHANGE UP (ref 27–34)
MCHC RBC-ENTMCNC: 34.8 GM/DL — SIGNIFICANT CHANGE UP (ref 32–36)
MCV RBC AUTO: 91.7 FL — SIGNIFICANT CHANGE UP (ref 80–100)
NRBC # BLD: 0 /100 WBCS — SIGNIFICANT CHANGE UP (ref 0–0)
PLATELET # BLD AUTO: 429 K/UL — HIGH (ref 150–400)
POTASSIUM SERPL-MCNC: 4.2 MMOL/L — SIGNIFICANT CHANGE UP (ref 3.5–5.3)
POTASSIUM SERPL-SCNC: 4.2 MMOL/L — SIGNIFICANT CHANGE UP (ref 3.5–5.3)
RBC # BLD: 3.38 M/UL — LOW (ref 4.2–5.8)
RBC # FLD: 14.1 % — SIGNIFICANT CHANGE UP (ref 10.3–14.5)
SODIUM SERPL-SCNC: 137 MMOL/L — SIGNIFICANT CHANGE UP (ref 135–145)
WBC # BLD: 6.92 K/UL — SIGNIFICANT CHANGE UP (ref 3.8–10.5)
WBC # FLD AUTO: 6.92 K/UL — SIGNIFICANT CHANGE UP (ref 3.8–10.5)

## 2019-06-30 PROCEDURE — 99232 SBSQ HOSP IP/OBS MODERATE 35: CPT

## 2019-06-30 PROCEDURE — 97110 THERAPEUTIC EXERCISES: CPT

## 2019-06-30 PROCEDURE — 74177 CT ABD & PELVIS W/CONTRAST: CPT

## 2019-06-30 PROCEDURE — 82553 CREATINE MB FRACTION: CPT

## 2019-06-30 PROCEDURE — 97161 PT EVAL LOW COMPLEX 20 MIN: CPT

## 2019-06-30 PROCEDURE — 83735 ASSAY OF MAGNESIUM: CPT

## 2019-06-30 PROCEDURE — 71046 X-RAY EXAM CHEST 2 VIEWS: CPT

## 2019-06-30 PROCEDURE — 84484 ASSAY OF TROPONIN QUANT: CPT

## 2019-06-30 PROCEDURE — 83880 ASSAY OF NATRIURETIC PEPTIDE: CPT

## 2019-06-30 PROCEDURE — 82962 GLUCOSE BLOOD TEST: CPT

## 2019-06-30 PROCEDURE — C1889: CPT

## 2019-06-30 PROCEDURE — 83605 ASSAY OF LACTIC ACID: CPT

## 2019-06-30 PROCEDURE — 84145 PROCALCITONIN (PCT): CPT

## 2019-06-30 PROCEDURE — 86850 RBC ANTIBODY SCREEN: CPT

## 2019-06-30 PROCEDURE — 81001 URINALYSIS AUTO W/SCOPE: CPT

## 2019-06-30 PROCEDURE — 86900 BLOOD TYPING SEROLOGIC ABO: CPT

## 2019-06-30 PROCEDURE — 88307 TISSUE EXAM BY PATHOLOGIST: CPT

## 2019-06-30 PROCEDURE — 87086 URINE CULTURE/COLONY COUNT: CPT

## 2019-06-30 PROCEDURE — 99239 HOSP IP/OBS DSCHRG MGMT >30: CPT

## 2019-06-30 PROCEDURE — 96367 TX/PROPH/DG ADDL SEQ IV INF: CPT

## 2019-06-30 PROCEDURE — 80048 BASIC METABOLIC PNL TOTAL CA: CPT

## 2019-06-30 PROCEDURE — 87493 C DIFF AMPLIFIED PROBE: CPT

## 2019-06-30 PROCEDURE — 99285 EMERGENCY DEPT VISIT HI MDM: CPT | Mod: 25

## 2019-06-30 PROCEDURE — 86901 BLOOD TYPING SEROLOGIC RH(D): CPT

## 2019-06-30 PROCEDURE — 36415 COLL VENOUS BLD VENIPUNCTURE: CPT

## 2019-06-30 PROCEDURE — 97116 GAIT TRAINING THERAPY: CPT

## 2019-06-30 PROCEDURE — 74018 RADEX ABDOMEN 1 VIEW: CPT

## 2019-06-30 PROCEDURE — 74019 RADEX ABDOMEN 2 VIEWS: CPT

## 2019-06-30 PROCEDURE — 71045 X-RAY EXAM CHEST 1 VIEW: CPT

## 2019-06-30 PROCEDURE — 97530 THERAPEUTIC ACTIVITIES: CPT

## 2019-06-30 PROCEDURE — 93005 ELECTROCARDIOGRAM TRACING: CPT

## 2019-06-30 PROCEDURE — 85027 COMPLETE CBC AUTOMATED: CPT

## 2019-06-30 PROCEDURE — 83690 ASSAY OF LIPASE: CPT

## 2019-06-30 PROCEDURE — 96365 THER/PROPH/DIAG IV INF INIT: CPT

## 2019-06-30 PROCEDURE — 85610 PROTHROMBIN TIME: CPT

## 2019-06-30 PROCEDURE — 85730 THROMBOPLASTIN TIME PARTIAL: CPT

## 2019-06-30 PROCEDURE — 80053 COMPREHEN METABOLIC PANEL: CPT

## 2019-06-30 PROCEDURE — 99024 POSTOP FOLLOW-UP VISIT: CPT

## 2019-06-30 RX ADMIN — Medication 50 MILLIGRAM(S): at 05:43

## 2019-06-30 RX ADMIN — DEXTROSE MONOHYDRATE, SODIUM CHLORIDE, AND POTASSIUM CHLORIDE 75 MILLILITER(S): 50; .745; 4.5 INJECTION, SOLUTION INTRAVENOUS at 13:36

## 2019-06-30 RX ADMIN — NYSTATIN CREAM 1 APPLICATION(S): 100000 CREAM TOPICAL at 13:37

## 2019-06-30 RX ADMIN — ENOXAPARIN SODIUM 40 MILLIGRAM(S): 100 INJECTION SUBCUTANEOUS at 11:59

## 2019-06-30 RX ADMIN — TAMSULOSIN HYDROCHLORIDE 0.4 MILLIGRAM(S): 0.4 CAPSULE ORAL at 05:43

## 2019-06-30 RX ADMIN — PANTOPRAZOLE SODIUM 40 MILLIGRAM(S): 20 TABLET, DELAYED RELEASE ORAL at 11:58

## 2019-06-30 RX ADMIN — FINASTERIDE 5 MILLIGRAM(S): 5 TABLET, FILM COATED ORAL at 11:59

## 2019-06-30 RX ADMIN — NYSTATIN CREAM 1 APPLICATION(S): 100000 CREAM TOPICAL at 05:43

## 2019-06-30 RX ADMIN — Medication 60 MILLIGRAM(S): at 05:42

## 2019-06-30 RX ADMIN — LISINOPRIL 2.5 MILLIGRAM(S): 2.5 TABLET ORAL at 05:43

## 2019-06-30 NOTE — CHART NOTE - NSCHARTNOTEFT_GEN_A_CORE
PGY-1 Service Note    Called by RN due to patient's complaint of watery diarrhea. Patient seen and examined at bedside. Patient reports 4 episodes of watery diarrhea on this evening shift. Denies CP, SOB, N/V, abd pain.    T(F): 97.5 (06-30-19 @ 05:11), Max: 98.2 (06-29-19 @ 21:15)  HR: 97 (06-30-19 @ 05:11) (97 - 100)  BP: 175/93 (06-30-19 @ 05:11) (131/76 - 175/93)  RR: 18 (06-30-19 @ 05:11) (18 - 18)  SpO2: 91% (06-30-19 @ 05:11) (91% - 97%)    Physical Exam:  General: no acute distress  HEENT: NCAT, PERRL  Cardio: +S1/S2, regular rate and rhythm  Lungs: clear to auscultation bilaterally  Abd: soft, nontender, nondistended, +BS x 4 quadrants  Ext: no pedal edema, no calf tenderness                          10.8   6.77  )-----------( 416      ( 29 Jun 2019 08:43 )             30.6     06-29    137  |  103  |  11  ----------------------------<  91  3.8   |  27  |  0.73    Ca    7.6<L>      29 Jun 2019 08:43  Mg     1.9     06-28        A/P: 89M with PMH of CAD s/p CABG (1995), b/l inguinal hernia repairs, enlarged prostate s/p TURP x2, HTN and HLD presented with abdominal pain for 4 days, admitted with cecal volvulus, found to have ischemic and obstructed right cecum/colon; s/p Right hemicolectomy with removal of terminal, ileum and ileocolostomy POD#9, now with multiple episodes of watery diarrhea.  - Follow up C diff PCR collected by RN to R/O C difficile diarrhea  - RN to call if any changes. PGY-1 Service Note    Called by RN due to patient's complaint of watery diarrhea. Patient seen and examined at bedside. Patient reports 4 episodes of watery diarrhea on this evening shift. Denies CP, SOB, N/V, abd pain.    T(F): 97.5 (06-30-19 @ 05:11), Max: 98.2 (06-29-19 @ 21:15)  HR: 97 (06-30-19 @ 05:11) (97 - 100)  BP: 175/93 (06-30-19 @ 05:11) (131/76 - 175/93)  RR: 18 (06-30-19 @ 05:11) (18 - 18)  SpO2: 91% (06-30-19 @ 05:11) (91% - 97%)    Physical Exam:  General: no acute distress  HEENT: NCAT, PERRL  Cardio: +S1/S2, regular rate and rhythm  Lungs: clear to auscultation bilaterally  Abd: soft, nontender, nondistended, +BS x 4 quadrants  Ext: no pedal edema, no calf tenderness                          10.8   6.77  )-----------( 416      ( 29 Jun 2019 08:43 )             30.6     06-29    137  |  103  |  11  ----------------------------<  91  3.8   |  27  |  0.73    Ca    7.6<L>      29 Jun 2019 08:43  Mg     1.9     06-28        A/P: 89M with PMH of CAD s/p CABG (1995), b/l inguinal hernia repairs, enlarged prostate s/p TURP x2, HTN and HLD presented with abdominal pain for 4 days, admitted with cecal volvulus, found to have ischemic and obstructed right cecum/colon; s/p Right hemicolectomy with removal of terminal, ileum and ileocolostomy POD#9, now with multiple episodes of watery diarrhea.  - Follow up C diff PCR collected by RN to R/O C difficile diarrhea  - Discontinued standing Colace  - RN to call if any changes.

## 2019-06-30 NOTE — PROVIDER CONTACT NOTE (OTHER) - ACTION/TREATMENT ORDERED:
MD assessed pt and ordered medication. Medication given and pt BP reassessed. MD aware of new BP. No further orders.
MD saw pt. Ordered Nystatin powder MD states pt looks like a fungal infection. No further orders. Bladder scan done 87 ml
Straight cath and press bladder tonight see if pt able to void in the morning. May need to place wade back in and urologist to see pt.
Cdiff specimen
MD aware, no new orders at this time. will pass onto day team. will continue to monitor and notify if any changes.

## 2019-06-30 NOTE — PROVIDER CONTACT NOTE (OTHER) - ASSESSMENT
No signs of distress
pt now appears at rest, comfortably  no distress noted  approx 20cc dark liquid noted in basin  VSS

## 2019-06-30 NOTE — PROGRESS NOTE ADULT - SUBJECTIVE AND OBJECTIVE BOX
INTERVAL Hx:  s/p right hemicolectomy.  Urinating w/o need for wade.  Still has sensation of bladder fullness.      MEDICATIONS  (STANDING):  atorvastatin 80 milliGRAM(s) Oral at bedtime  enoxaparin Injectable 40 milliGRAM(s) SubCutaneous daily  finasteride 5 milliGRAM(s) Oral daily  lisinopril 2.5 milliGRAM(s) Oral daily  metoprolol succinate ER 50 milliGRAM(s) Oral daily  NIFEdipine XL 60 milliGRAM(s) Oral daily  nystatin Powder 1 Application(s) Topical three times a day  pantoprazole  Injectable 40 milliGRAM(s) IV Push daily  sodium chloride 0.9% with potassium chloride 20 mEq/L 1000 milliLiter(s) (75 mL/Hr) IV Continuous <Continuous>  tamsulosin 0.4 milliGRAM(s) Oral two times a day    MEDICATIONS  (PRN):  acetaminophen   Tablet .. 650 milliGRAM(s) Oral every 6 hours PRN Temp greater or equal to 38.5C (101.3F), Mild Pain (1 - 3)  simethicone 80 milliGRAM(s) Chew three times a day PRN Gas        Vital Signs Last 24 Hrs  T(C): 36.4 (30 Jun 2019 05:11), Max: 36.8 (29 Jun 2019 13:32)  T(F): 97.5 (30 Jun 2019 05:11), Max: 98.3 (29 Jun 2019 13:32)  HR: 99 (30 Jun 2019 06:46) (93 - 100)  BP: 165/82 (30 Jun 2019 06:46) (105/68 - 175/93)  BP(mean): --  RR: 18 (30 Jun 2019 05:11) (18 - 18)  SpO2: 91% (30 Jun 2019 05:11) (91% - 97%)    PHYSICAL EXAM:    Wade: not present    LABS:                        10.8   6.92  )-----------( 429      ( 30 Jun 2019 08:28 )             31.0     06-30    137  |  103  |  13  ----------------------------<  89  4.2   |  27  |  0.81    Ca    7.6<L>      30 Jun 2019 08:28

## 2019-06-30 NOTE — PROGRESS NOTE ADULT - SUBJECTIVE AND OBJECTIVE BOX
Vital Signs Last 24 Hrs  T(C): 36.4 (30 Jun 2019 05:11), Max: 36.8 (29 Jun 2019 13:32)  T(F): 97.5 (30 Jun 2019 05:11), Max: 98.3 (29 Jun 2019 13:32)  HR: 99 (30 Jun 2019 06:46) (93 - 100)  BP: 165/82 (30 Jun 2019 06:46) (105/68 - 175/93)  BP(mean): --  RR: 18 (30 Jun 2019 05:11) (18 - 18)  SpO2: 91% (30 Jun 2019 05:11) (91% - 97%)    06-29 @ 07:01  -  06-30 @ 07:00  --------------------------------------------------------Sarahi  IN: 825 mL / OUT: 0 mL / NET: 825 mL                              10.8   6.92  )-----------( 429      ( 30 Jun 2019 08:28 )             31.0                         10.8   6.77  )-----------( 416      ( 29 Jun 2019 08:43 )             30.6                         11.6   7.06  )-----------( 403      ( 28 Jun 2019 08:29 )             32.9       06-30    137  |  103  |  13  ----------------------------<  89  4.2   |  27  |  0.81    Ca    7.6<L>      30 Jun 2019 08:28        Physical Exam:  Awake alert and oriented x3 in no acute distress. NCAT, PERRLA, EOMI  Lungs clear bilaterally without wheezes rhonchi or rales.  Regular rate and rhythm  Abdomen soft, nontender, nondistended, positive bowel sounds in all 4 quadrants. No evidence of hernia or masses. Surgical incisions remained well approximated and healing appropriately without erythema, induration or fluctuance. Dressings remained clean dry and intact  Extremities without edema or tenderness.

## 2019-06-30 NOTE — PROGRESS NOTE ADULT - ASSESSMENT
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  - Tolerated procedure well   - H/H trending down slowly, continue to monitor  - Pain control  - Encourage I/S     CAD   - Please resume ASA ASAP for Hx of CAD s/p CABG  - Continue BB  - Cont lisinopril 2.5 mg daily with hold parameters   - Continue Statin  - Monitor and replete lytes as needed    HTN  - c/w lisinopril 2.5 mg daily with hold parameters.  BP is labile, range 100's-170    - Continue BB/ nifedipine  - Monitor routine hemodynamics.     Urinary obstruction  - Weaver placed then removed 2/2 discomfort s/p removal, now voiding  - Uro following    DVT ppx  - Per Primary    All other workup per Primary  - D/C planning     Kimberlyn Simon [facility]  Cardiology   Spectra #[phone]

## 2019-06-30 NOTE — PROGRESS NOTE ADULT - ASSESSMENT
s/p Right hemicolectomy.  Bowel functioning well  Still feels bladder fullness.  Cleared by Urology.  Tolerating diet.  Stable from surgical standpoint.  F/u with Dr Tse in office following discharge.

## 2019-06-30 NOTE — PROGRESS NOTE ADULT - PROVIDER SPECIALTY LIST ADULT
Cardiology
Hospitalist
Surgery
Urology
Cardiology
Hospitalist
Surgery
Cardiology
Hospitalist
Hospitalist

## 2019-06-30 NOTE — PROGRESS NOTE ADULT - REASON FOR ADMISSION
abdominal pain

## 2019-06-30 NOTE — PROGRESS NOTE ADULT - ATTENDING COMMENTS
Pt looks very comfortable . Had problems urinating and needed straight cath. Will ask for  nonsult.
Pt seen. He is passing lots of flatus.  Abdomen is soft. Wound intact.  Clear liquids and if tolerated will advance.
History and events as above.  Passing more flatus.  Tolerating clears.  Benign abdomen and clean wound.  Non suggestive labs.  Tapering IVF.  Plan for full liquids tomorrow.  Discontinued Weaver catheter.  Agree with mobilization.  Overall, good progress and appears surgically stable.
likely dc tomorrow if tolerates po and uro issues resolving
-Events as noted.  Patient in good spirits now and eager to "drink... eat..."  Denies abdominal pain at this point and no further nausea or vomiting since this am.  Passing flatus today and with voids after Weaver removed.  -Vitals non-suggestive.    Patient appears well and ambulating just earlier with staff.  Abdominal examination reveals clean wound, soft distension and tenderness or guarding or rebound or referred pain.  -Labs reassuring with normal WBC's  Chemistry reassuring with no acidosis or evidence of end organ failure  -Plain films suggestive of post-operative ileus  As such, d/c abx, eliminate narcotics, mobilize, following lytes, slow progress with diet, should there be more clinical set back, considering interval CT but overall patient appears well with benign exam and not unexpected post-op course given age and nature of this emergent procedure.
Chart reviewed    Patient seen and examined    Agree with plan as outlined
Chart reviewed    Patient seen and examined    Agree with plan as outlined above
Chart reviewed    Patient seen and examined    I agree with the plan as outlined above
I personally saw and examined the patient in detail.  I have spoken to the above provider regarding the assessment and plan of care.  I reviewed the above assessment and plan of care, and agree.  I have made changes in the body of the note where appropriate.
I personally saw and examined the patient in detail.  I have spoken to the above provider regarding the assessment and plan of care.  I reviewed the above assessment and plan of care, and agree.  I have made changes in the body of the note where appropriate.
Seen/examined. agree with above.
The patient was personally seen and examined, in addition to being examined and evaluated by NP.  All elements of the note were edited where appropriate.
I saw and examined the patient personally. Spoke with above provider regarding this case. I reviewed the above findings completely.  I agree with the above history, physical, and plan which I have edited where appropriate.
If advances and clinically improved poss dc tomorrow
dc plannign for tomorrow if tolerating reg diet

## 2019-06-30 NOTE — PROGRESS NOTE ADULT - SUBJECTIVE AND OBJECTIVE BOX
[facility] -- Nicole Mayer, Radha, Zachery, Elmer Prasad Savella  Office # [phone]    Follow Up:  Preop Eval    Subjective/Observations: Seen sitting on the chair, nasal cannula in use.  Denies any respiratory discomfort.  No c/o abdominal pain, nausea, or vomiting.  However, c/o watery stools overnight, though, improved towards the morning    REVIEW OF SYSTEMS: All other review of systems is negative unless indicated above  PAST MEDICAL & SURGICAL HISTORY:  CAD (coronary artery disease): s/p CABG, 1995  Anginal pain  HTN (hypertension)  Enlarged prostate  H/O inguinal hernia repair: bilateral  S/P TURP: x2    MEDICATIONS  (STANDING):  atorvastatin 80 milliGRAM(s) Oral at bedtime  enoxaparin Injectable 40 milliGRAM(s) SubCutaneous daily  finasteride 5 milliGRAM(s) Oral daily  lisinopril 2.5 milliGRAM(s) Oral daily  metoprolol succinate ER 50 milliGRAM(s) Oral daily  NIFEdipine XL 60 milliGRAM(s) Oral daily  nystatin Powder 1 Application(s) Topical three times a day  pantoprazole  Injectable 40 milliGRAM(s) IV Push daily  sodium chloride 0.9% with potassium chloride 20 mEq/L 1000 milliLiter(s) (75 mL/Hr) IV Continuous <Continuous>  tamsulosin 0.4 milliGRAM(s) Oral two times a day    MEDICATIONS  (PRN):  acetaminophen   Tablet .. 650 milliGRAM(s) Oral every 6 hours PRN Temp greater or equal to 38.5C (101.3F), Mild Pain (1 - 3)  simethicone 80 milliGRAM(s) Chew three times a day PRN Gas    Allergies    No Known Allergies    Intolerances    Vital Signs Last 24 Hrs  T(C): 36.4 (30 Jun 2019 05:11), Max: 36.8 (29 Jun 2019 13:32)  T(F): 97.5 (30 Jun 2019 05:11), Max: 98.3 (29 Jun 2019 13:32)  HR: 99 (30 Jun 2019 06:46) (93 - 100)  BP: 165/82 (30 Jun 2019 06:46) (105/68 - 175/93)  BP(mean): --  RR: 18 (30 Jun 2019 05:11) (18 - 18)  SpO2: 91% (30 Jun 2019 05:11) (91% - 97%)  I&O's Summary    29 Jun 2019 07:01  -  30 Jun 2019 07:00  --------------------------------------------------------  IN: 825 mL / OUT: 0 mL / NET: 825 mL    PHYSICAL EXAM:  TELE: Not on tele  Constitutional: NAD, awake and alert, obese  HEENT: Moist Mucous Membranes, Anicteric  Pulmonary: Non-labored, breath sounds are diminished at bases bilaterally, No wheezing, rales or rhonchi  Cardiovascular: Regular, S1 and S2, No murmurs, rubs, gallops or clicks  Gastrointestinal: Bowel Sounds present, soft, nontender.   Lymph: No peripheral edema. No lymphadenopathy.  Skin: No visible rashes or ulcers. Midabdominal incision with staples intact  Psych:  Mood & affect appropriate  LABS: All Labs Reviewed:                        10.8   6.92  )-----------( 429      ( 30 Jun 2019 08:28 )             31.0                         10.8   6.77  )-----------( 416      ( 29 Jun 2019 08:43 )             30.6                         11.6   7.06  )-----------( 403      ( 28 Jun 2019 08:29 )             32.9     30 Jun 2019 08:28    137    |  103    |  13     ----------------------------<  89     4.2     |  27     |  0.81   29 Jun 2019 08:43    137    |  103    |  11     ----------------------------<  91     3.8     |  27     |  0.73   28 Jun 2019 08:29    138    |  103    |  11     ----------------------------<  93     3.8     |  28     |  0.83     Ca    7.6        30 Jun 2019 08:28  Ca    7.6        29 Jun 2019 08:43  Ca    7.7        28 Jun 2019 08:29  Mg     1.9       28 Jun 2019 08:29    < from: 12 Lead ECG (06.20.19 @ 15:29) >  Ventricular Rate 106 BPM    Atrial Rate 106 BPM    P-R Interval 184 ms    QRS Duration 108 ms    Q-T Interval 342 ms    QTC Calculation(Bezet) 454 ms    P Axis 41 degrees    R Axis -58 degrees    T Axis 74 degrees    Diagnosis Line Sinus tachycardia  Left anterior fascicular block  Abnormal ECG  Confirmed by DEEPAK PRASAD ([id number]) on 6/21/2019 8:18:12 AM    < end of copied text >    < from: Xray Abdomen 1 View PORTABLE -Urgent (06.27.19 @ 08:59) >  EXAM:  XR ABDOMEN PORTABLE URGENT 1V                          PROCEDURE DATE:  06/27/2019      INTERPRETATION:  Follow-up.    AP portable supine. Prior 6/21/2019.    Postoperative staples project over the right lower abdomen and pelvis.   There is mild distention of small bowel loops. Gas noted in the colon and   rectum. Findings likely represent postoperative ileus. No definite   obstruction. Recommend continued follow-up as clinically indicated.    Impression: As above    LEORA MOORE M.D., ATTENDING RADIOLOGIST  This document has been electronically signed. Jun 27 2019 11:30AM      < end of copied text >    < from: Xray Chest 1 View- PORTABLE-Urgent (06.25.19 @ 10:46) >    EXAM:  XR CHEST PORTABLE URGENT 1V                          PROCEDURE DATE:  06/25/2019      INTERPRETATION:  AP semierect chest on June 25, 2019 at 10:25 AM. Patient   is 4 days postop right hemicolectomy.    Elevated diaphragms crowd the chest. Heart size cannot be assessed.   Sternotomy again noted.    Mild left base effusion noted possibly somewhat increased from June 21.   This may be associated with some left base atelectasis increasing as well.    IMPRESSION: Slightly increasing left base pleural pulmonary process.    BRIGIDO BARNARD M.D., ATTENDING RADIOLOGIST  This document has been electronically signed. Jun 25 2019 10:57AM      < end of copied text >

## 2019-07-01 ENCOUNTER — APPOINTMENT (OUTPATIENT)
Dept: BARIATRICS | Facility: CLINIC | Age: 84
End: 2019-07-01
Payer: MEDICARE

## 2019-07-01 VITALS
WEIGHT: 163.14 LBS | DIASTOLIC BLOOD PRESSURE: 72 MMHG | HEIGHT: 67 IN | OXYGEN SATURATION: 93 % | BODY MASS INDEX: 25.61 KG/M2 | SYSTOLIC BLOOD PRESSURE: 112 MMHG | HEART RATE: 93 BPM

## 2019-07-01 PROBLEM — I25.10 ATHEROSCLEROTIC HEART DISEASE OF NATIVE CORONARY ARTERY WITHOUT ANGINA PECTORIS: Chronic | Status: ACTIVE | Noted: 2019-06-20

## 2019-07-01 PROCEDURE — 99024 POSTOP FOLLOW-UP VISIT: CPT

## 2019-07-01 RX ORDER — AMPICILLIN 500 MG/1
500 CAPSULE ORAL
Qty: 28 | Refills: 0 | Status: DISCONTINUED | COMMUNITY
Start: 2018-10-09 | End: 2019-07-01

## 2019-07-01 RX ORDER — CALCIUM CARBONATE 260MG(650)
TABLET,CHEWABLE ORAL
Refills: 0 | Status: ACTIVE | COMMUNITY

## 2019-07-01 RX ORDER — SULFAMETHOXAZOLE AND TRIMETHOPRIM 800; 160 MG/1; MG/1
800-160 TABLET ORAL
Qty: 28 | Refills: 0 | Status: DISCONTINUED | COMMUNITY
Start: 2019-03-07 | End: 2019-07-01

## 2019-07-01 RX ORDER — ASPIRIN 81 MG
81 TABLET, DELAYED RELEASE (ENTERIC COATED) ORAL
Refills: 0 | Status: ACTIVE | COMMUNITY

## 2019-07-02 NOTE — PHYSICAL EXAM
[Alert] : alert [Oriented to Person] : oriented to person [Oriented to Place] : oriented to place [Oriented to Time] : oriented to time [Calm] : calm [de-identified] : well appearing, no acute distress [de-identified] : EOMI, anicteric  [de-identified] : nontender, soft, midline incision with surgical staples without erythema or drainage [de-identified] : no calf tenderness, +1 pitting edema of BLE

## 2019-07-02 NOTE — REVIEW OF SYSTEMS
[Lower Ext Edema] : lower extremity edema [Negative] : Gastrointestinal [Heart Rate Is Slow] : the heart rate was not slow [Heart Rate Is Fast] : the heart rate was not fast [Chest Pain] : no chest pain [Palpitations] : no palpitations [Leg Claudication] : no intermittent leg claudication

## 2019-07-02 NOTE — HISTORY OF PRESENT ILLNESS
[de-identified] : 89 year old male with past medical history of CAD s/p CABG (1995), b/l inguinal hernia repairs, enlarged prostate s/p TURP x2, HTN and HLD now over a week s/p right hemicolectomy with removal of terminal, ileum and ileocolostomy presents today for first post operative visit.  He is tolerating foods and is drinking adequate water daily.  Patient denies shortness of breath, calf pain, nausea, vomiting, diarrhea and constipation. Patient is ambulating frequently for exercise.\par

## 2019-07-02 NOTE — ASSESSMENT
[FreeTextEntry1] : 89 year old male now over a week s/p right hemicolectomy with removal of terminal, ileum and ileocolostomy presents today for first post operative visit. He is doing well. Five of the surgical staples were removed and replaced with Steri-Strips without any complications. Patient was advised to continue current diet, increase water intake and ambulate frequently. He was directed to avoid heavy lifting until 6 weeks post op. \par \par Follow up with Dr. Tse in one week\par Call with any questions or concerns

## 2019-07-08 ENCOUNTER — APPOINTMENT (OUTPATIENT)
Dept: SURGERY | Facility: CLINIC | Age: 84
End: 2019-07-08
Payer: MEDICARE

## 2019-07-08 VITALS
DIASTOLIC BLOOD PRESSURE: 86 MMHG | HEIGHT: 67 IN | WEIGHT: 148.81 LBS | SYSTOLIC BLOOD PRESSURE: 153 MMHG | BODY MASS INDEX: 23.36 KG/M2 | HEART RATE: 83 BPM | OXYGEN SATURATION: 96 %

## 2019-07-08 DIAGNOSIS — Z90.49 ACQUIRED ABSENCE OF OTHER SPECIFIED PARTS OF DIGESTIVE TRACT: ICD-10-CM

## 2019-07-08 PROCEDURE — 99024 POSTOP FOLLOW-UP VISIT: CPT

## 2019-07-08 RX ORDER — HYDROCODONE BITARTRATE AND HOMATROPINE METHYLBROMIDE 5; 1.5 MG/5ML; MG/5ML
5-1.5 SYRUP ORAL
Qty: 240 | Refills: 0 | Status: COMPLETED | COMMUNITY
Start: 2019-04-03 | End: 2019-07-08

## 2019-07-08 RX ORDER — HYDROCODONE BITARTRATE AND ACETAMINOPHEN 5; 325 MG/1; MG/1
5-325 TABLET ORAL
Qty: 7 | Refills: 0 | Status: COMPLETED | COMMUNITY
Start: 2019-04-02 | End: 2019-07-08

## 2019-07-09 PROBLEM — Z90.49 S/P RIGHT HEMICOLECTOMY: Status: ACTIVE | Noted: 2019-07-01

## 2019-07-09 NOTE — PLAN
[FreeTextEntry1] : Clinically improving overall.\par Appears surgically stable.\par Vitals reassuring.\par Abdomen benign.\par Pathology report reviewed and discussed in detail with patient and spouse: BENIGN.\par Staples discontinued without issue.\par To continue in follow-up with his cardiologist.\par Directed to follow-up with his Urologist.\par Encouraged to call with questions, further concerns or future changes.\par Otherwise, RTO in ~4 months.

## 2019-07-09 NOTE — PHYSICAL EXAM
[Normal Rate and Rhythm] : normal rate and rhythm [Respiratory Effort] : normal respiratory effort [No Rash or Lesion] : No rash or lesion [Alert] : not alert [Oriented to Person] : disoriented to person [Oriented to Place] : disoriented to place [Oriented to Time] : disoriented to time [Calm] : calm [de-identified] : Elderly but appears well. [de-identified] : Normocephalic and atraumatic. [de-identified] : Supple with full range of motion. [de-identified] : Soft, nontense, nontender.  Operative site clean and dry and intact.\par No expressible drainage or appreciable odor.\par No acute overlying or surrounding inflammatory changes.\par Mild post-operative edema and ecchymoses, but still well within normal limits.  No eleazar subcutaneous collections to suggest post-operative hematoma or seroma or abscess.  No incisional hernia(s).\par  [de-identified] : External genitalia WNL. [de-identified] : Grossly symmetric and within normal limits without any obvious motor or sensory deficits.

## 2019-07-09 NOTE — HISTORY OF PRESENT ILLNESS
[de-identified] : Presents in excellent spirits for ongoing post-operative surgical follow-up.\par Denies any lingering incisional pain or more general abdominal discomfort.\par Reports slowly returning energy.\par Reports improving diet and no lower GI complaints.\par Still with some urinary hesitancy and decreased flow, but more towards baseline.

## 2019-07-09 NOTE — REVIEW OF SYSTEMS
[Feeling Tired] : feeling tired [As Noted in HPI] : as noted in HPI [Negative] : Heme/Lymph [FreeTextEntry2] : Improving...

## 2019-07-09 NOTE — DATA REVIEWED
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  Two benign lymph nodes.  Small bowel with area of mucosal flattening.  No evidence of malignancy.\par Verified by: Kei Barrientos MD\par

## 2019-07-09 NOTE — REASON FOR VISIT
[Post Op: _________] : a [unfilled] post op visit [Spouse] : spouse [FreeTextEntry1] : S/P Open Right Hemicolectomy for cecal volvulus/ bascule

## 2019-07-16 ENCOUNTER — INPATIENT (INPATIENT)
Facility: HOSPITAL | Age: 84
LOS: 4 days | Discharge: HOME | DRG: 683 | End: 2019-07-21
Attending: HOSPITALIST | Admitting: STUDENT IN AN ORGANIZED HEALTH CARE EDUCATION/TRAINING PROGRAM
Payer: MEDICARE

## 2019-07-16 VITALS
DIASTOLIC BLOOD PRESSURE: 65 MMHG | WEIGHT: 143.96 LBS | TEMPERATURE: 98 F | HEART RATE: 90 BPM | SYSTOLIC BLOOD PRESSURE: 125 MMHG | RESPIRATION RATE: 17 BRPM | OXYGEN SATURATION: 95 %

## 2019-07-16 DIAGNOSIS — R33.9 RETENTION OF URINE, UNSPECIFIED: ICD-10-CM

## 2019-07-16 DIAGNOSIS — D64.9 ANEMIA, UNSPECIFIED: ICD-10-CM

## 2019-07-16 DIAGNOSIS — Z98.890 OTHER SPECIFIED POSTPROCEDURAL STATES: Chronic | ICD-10-CM

## 2019-07-16 DIAGNOSIS — I25.10 ATHEROSCLEROTIC HEART DISEASE OF NATIVE CORONARY ARTERY WITHOUT ANGINA PECTORIS: ICD-10-CM

## 2019-07-16 DIAGNOSIS — Z90.49 ACQUIRED ABSENCE OF OTHER SPECIFIED PARTS OF DIGESTIVE TRACT: Chronic | ICD-10-CM

## 2019-07-16 DIAGNOSIS — E78.5 HYPERLIPIDEMIA, UNSPECIFIED: ICD-10-CM

## 2019-07-16 DIAGNOSIS — N40.0 BENIGN PROSTATIC HYPERPLASIA WITHOUT LOWER URINARY TRACT SYMPTOMS: ICD-10-CM

## 2019-07-16 DIAGNOSIS — E87.1 HYPO-OSMOLALITY AND HYPONATREMIA: ICD-10-CM

## 2019-07-16 DIAGNOSIS — Z90.79 ACQUIRED ABSENCE OF OTHER GENITAL ORGAN(S): Chronic | ICD-10-CM

## 2019-07-16 DIAGNOSIS — N17.9 ACUTE KIDNEY FAILURE, UNSPECIFIED: ICD-10-CM

## 2019-07-16 DIAGNOSIS — Z29.9 ENCOUNTER FOR PROPHYLACTIC MEASURES, UNSPECIFIED: ICD-10-CM

## 2019-07-16 DIAGNOSIS — I10 ESSENTIAL (PRIMARY) HYPERTENSION: ICD-10-CM

## 2019-07-16 LAB
ANION GAP SERPL CALC-SCNC: 12 MMOL/L — SIGNIFICANT CHANGE UP (ref 5–17)
APPEARANCE UR: CLEAR — SIGNIFICANT CHANGE UP
BACTERIA # UR AUTO: ABNORMAL
BILIRUB UR-MCNC: ABNORMAL
BUN SERPL-MCNC: 60 MG/DL — HIGH (ref 7–23)
CALCIUM SERPL-MCNC: 7.8 MG/DL — LOW (ref 8.5–10.1)
CHLORIDE SERPL-SCNC: 95 MMOL/L — LOW (ref 96–108)
CO2 SERPL-SCNC: 20 MMOL/L — LOW (ref 22–31)
COLOR SPEC: ABNORMAL
CREAT SERPL-MCNC: 4 MG/DL — HIGH (ref 0.5–1.3)
DIFF PNL FLD: ABNORMAL
EPI CELLS # UR: SIGNIFICANT CHANGE UP
GLUCOSE SERPL-MCNC: 124 MG/DL — HIGH (ref 70–99)
GLUCOSE UR QL: NEGATIVE — SIGNIFICANT CHANGE UP
HCT VFR BLD CALC: 27.2 % — LOW (ref 39–50)
HGB BLD-MCNC: 9.5 G/DL — LOW (ref 13–17)
KETONES UR-MCNC: NEGATIVE — SIGNIFICANT CHANGE UP
LEUKOCYTE ESTERASE UR-ACNC: NEGATIVE — SIGNIFICANT CHANGE UP
MCHC RBC-ENTMCNC: 31.3 PG — SIGNIFICANT CHANGE UP (ref 27–34)
MCHC RBC-ENTMCNC: 34.9 GM/DL — SIGNIFICANT CHANGE UP (ref 32–36)
MCV RBC AUTO: 89.5 FL — SIGNIFICANT CHANGE UP (ref 80–100)
NITRITE UR-MCNC: POSITIVE
NRBC # BLD: 0 /100 WBCS — SIGNIFICANT CHANGE UP (ref 0–0)
PH UR: 5 — SIGNIFICANT CHANGE UP (ref 5–8)
PLATELET # BLD AUTO: 272 K/UL — SIGNIFICANT CHANGE UP (ref 150–400)
POTASSIUM SERPL-MCNC: 5 MMOL/L — SIGNIFICANT CHANGE UP (ref 3.5–5.3)
POTASSIUM SERPL-SCNC: 5 MMOL/L — SIGNIFICANT CHANGE UP (ref 3.5–5.3)
PROT UR-MCNC: 25 MG/DL
RBC # BLD: 3.04 M/UL — LOW (ref 4.2–5.8)
RBC # FLD: 14.3 % — SIGNIFICANT CHANGE UP (ref 10.3–14.5)
RBC CASTS # UR COMP ASSIST: ABNORMAL /HPF (ref 0–4)
SODIUM SERPL-SCNC: 127 MMOL/L — LOW (ref 135–145)
SP GR SPEC: 1.01 — SIGNIFICANT CHANGE UP (ref 1.01–1.02)
UROBILINOGEN FLD QL: 1
WBC # BLD: 6.99 K/UL — SIGNIFICANT CHANGE UP (ref 3.8–10.5)
WBC # FLD AUTO: 6.99 K/UL — SIGNIFICANT CHANGE UP (ref 3.8–10.5)
WBC UR QL: SIGNIFICANT CHANGE UP

## 2019-07-16 PROCEDURE — 99223 1ST HOSP IP/OBS HIGH 75: CPT | Mod: GC,AI

## 2019-07-16 PROCEDURE — 71045 X-RAY EXAM CHEST 1 VIEW: CPT | Mod: 26

## 2019-07-16 PROCEDURE — 99285 EMERGENCY DEPT VISIT HI MDM: CPT

## 2019-07-16 PROCEDURE — 93010 ELECTROCARDIOGRAM REPORT: CPT

## 2019-07-16 RX ORDER — TAMSULOSIN HYDROCHLORIDE 0.4 MG/1
0.4 CAPSULE ORAL AT BEDTIME
Refills: 0 | Status: DISCONTINUED | OUTPATIENT
Start: 2019-07-16 | End: 2019-07-21

## 2019-07-16 RX ORDER — METOPROLOL TARTRATE 50 MG
50 TABLET ORAL DAILY
Refills: 0 | Status: DISCONTINUED | OUTPATIENT
Start: 2019-07-16 | End: 2019-07-21

## 2019-07-16 RX ORDER — SODIUM CHLORIDE 9 MG/ML
1000 INJECTION INTRAMUSCULAR; INTRAVENOUS; SUBCUTANEOUS
Refills: 0 | Status: DISCONTINUED | OUTPATIENT
Start: 2019-07-16 | End: 2019-07-16

## 2019-07-16 RX ORDER — ACETAMINOPHEN 500 MG
650 TABLET ORAL EVERY 6 HOURS
Refills: 0 | Status: DISCONTINUED | OUTPATIENT
Start: 2019-07-16 | End: 2019-07-21

## 2019-07-16 RX ORDER — ASPIRIN/CALCIUM CARB/MAGNESIUM 324 MG
81 TABLET ORAL DAILY
Refills: 0 | Status: DISCONTINUED | OUTPATIENT
Start: 2019-07-16 | End: 2019-07-21

## 2019-07-16 RX ORDER — SODIUM CHLORIDE 9 MG/ML
1000 INJECTION INTRAMUSCULAR; INTRAVENOUS; SUBCUTANEOUS
Refills: 0 | Status: DISCONTINUED | OUTPATIENT
Start: 2019-07-16 | End: 2019-07-19

## 2019-07-16 RX ORDER — HEPARIN SODIUM 5000 [USP'U]/ML
5000 INJECTION INTRAVENOUS; SUBCUTANEOUS EVERY 12 HOURS
Refills: 0 | Status: DISCONTINUED | OUTPATIENT
Start: 2019-07-16 | End: 2019-07-21

## 2019-07-16 RX ORDER — NIFEDIPINE 30 MG
60 TABLET, EXTENDED RELEASE 24 HR ORAL DAILY
Refills: 0 | Status: DISCONTINUED | OUTPATIENT
Start: 2019-07-16 | End: 2019-07-21

## 2019-07-16 RX ORDER — CEFTRIAXONE 500 MG/1
1000 INJECTION, POWDER, FOR SOLUTION INTRAMUSCULAR; INTRAVENOUS ONCE
Refills: 0 | Status: COMPLETED | OUTPATIENT
Start: 2019-07-16 | End: 2019-07-16

## 2019-07-16 RX ORDER — FINASTERIDE 5 MG/1
5 TABLET, FILM COATED ORAL DAILY
Refills: 0 | Status: DISCONTINUED | OUTPATIENT
Start: 2019-07-16 | End: 2019-07-21

## 2019-07-16 RX ORDER — ATORVASTATIN CALCIUM 80 MG/1
80 TABLET, FILM COATED ORAL AT BEDTIME
Refills: 0 | Status: DISCONTINUED | OUTPATIENT
Start: 2019-07-16 | End: 2019-07-21

## 2019-07-16 RX ADMIN — ATORVASTATIN CALCIUM 80 MILLIGRAM(S): 80 TABLET, FILM COATED ORAL at 22:58

## 2019-07-16 RX ADMIN — CEFTRIAXONE 100 MILLIGRAM(S): 500 INJECTION, POWDER, FOR SOLUTION INTRAMUSCULAR; INTRAVENOUS at 20:33

## 2019-07-16 RX ADMIN — TAMSULOSIN HYDROCHLORIDE 0.4 MILLIGRAM(S): 0.4 CAPSULE ORAL at 22:48

## 2019-07-16 NOTE — H&P ADULT - PROBLEM SELECTOR PLAN 3
Unknown if chronic. Current Na 127  - Pt is asymptomatic, not confused  - Continue normal saline IVF @ 80 cc/hr for 12 hours. Readdress in AM. - Current Na 127, na was 137 at time of discharge in june.  - Pt is asymptomatic, not confused  - Continue normal saline IVF @ 80 cc/hr for 12 hours. BMP in AM.

## 2019-07-16 NOTE — H&P ADULT - PROBLEM SELECTOR PLAN 6
Stable  - Continue Toprol 50mg qd  - Continue Procardia XL 60mg qd. s/p CABG, 1995  - Continue Toprol XL 50mg qd with hold parameters.

## 2019-07-16 NOTE — H&P ADULT - PROBLEM SELECTOR PLAN 5
s/p CABG, 1995  - Continue Toprol XL 50mg qd with hold parameters. chronic  continue home dose proscar and flomax

## 2019-07-16 NOTE — H&P ADULT - HISTORY OF PRESENT ILLNESS
89M w/pmh of CAD s/p CABG (1995), b/l inguinal hernia repairs, enlarged prostate s/p TURP x2, HTN and HLD presenting with urinary frequency. Patient states that for the last 3-4 days he has felt the urge to urinate frequently but when he attempts to go he can only eliminate small dribbles of urine. He went to his outpatient urologist Dr. Cruz and was placed on bactrim for possible prostatitis. Patient had no relief from his symptoms. Per patient he had a bladder u/s in his urologist office which did not show signs of retention. 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 Pt also was note to having some urinary discomfort and was seen by urology, his symptoms resolved and was instructed outpatient follow up with Dr Cruz primary outpatient urologist. Patient denies fever, chills, dysuria, urgency. Denies abdominal pain, nausea, vomiting.     In the ED   VS: WNL  Labs: Na 127, Cl 95, BUN 60, Cr 4.00, GFR 12. UA 3-5RBC, few bacteria, trace blood, moderate bilirubin, nitrite +.   EKG: NSR, LAD.   CXR: Status post CABG. Small left pleural effusion again seen.  Found to be in urinary retention, wade catheter placed in ED. 89M w/pmh of CAD s/p CABG (1995), b/l inguinal hernia repairs, enlarged prostate s/p TURP x2, HTN and HLD presenting with urinary frequency. Patient states that for the last 3-4 days he has felt the urge to urinate frequently but when he attempts to go he can only eliminate small dribbles of urine. He went to his outpatient urologist Dr. Cruz and was placed on bactrim for possible prostatitis. Patient had no relief from his symptoms. Per patient he had a bladder u/s in his urologist office which did not show signs of retention. 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 Pt also was note to having some urinary discomfort and was seen by urology, his symptoms resolved and was instructed outpatient follow up with Dr Cruz primary outpatient urologist. Patient denies fever, chills, dysuria, urgency. Denies abdominal pain, nausea, vomiting.     In the ED   VS: WNL  bladder scan performed in ED and wade placed  Labs: Na 127, Cl 95, BUN 60, Cr 4.00, GFR 12. UA 3-5RBC, few bacteria, trace blood, moderate bilirubin, nitrite +.   EKG: NSR, LAD.   CXR: Status post CABG. Small left pleural effusion again seen.  Found to be in urinary retention, wade catheter placed in ED.

## 2019-07-16 NOTE — ED PROVIDER NOTE - OBJECTIVE STATEMENT
89 yo M p/w dysuria x past few days. no cp/sob/palp. No abd pain. Pt unsure if he is having urinary retention. no fever / chills. pt seen by urology yest and was started on bactrim. Still with some persistent sx today. No neck /  back pain. no fever/chills. no agg/allev factors. no other inj or co. no recent travel. no back pain. no numb/ting/focal weak. no other inj or co.

## 2019-07-16 NOTE — ED PROVIDER NOTE - PMH
Anginal pain    CAD (coronary artery disease)  s/p CABG, 1995  Enlarged prostate    HTN (hypertension)

## 2019-07-16 NOTE — H&P ADULT - ASSESSMENT
89M w/pmh of CAD s/p CABG (1995), b/l inguinal hernia repairs, enlarged prostate s/p TURP x2, HTN and HLD presenting with urinary frequency. Admitted with ARF and urinary retention.

## 2019-07-16 NOTE — H&P ADULT - PROBLEM SELECTOR PLAN 1
acute  admit to [facility]  likely 2/2 urinary retention   continue IVF at 80cc/hr   avoid nephtrotoxic meds  monitor BMP in AM   nephro .      consulted, recs appreciated acute  admit to [facility]  likely 2/2 urinary retention   continue IVF at 80cc/hr   avoid nephtrotoxic meds  monitor BMP in AM   nephro Dr. Murillo consulted, recs appreciated acute, cr <1 on last admission  admit to [facility]  likely 2/2 urinary retention   continue IVF at 80cc/hr   avoid nephtrotoxic meds  monitor BMP in AM   nephro Dr. Murillo consulted, recs appreciated acute, cr <1 on last admission  admit to [facility]  likely 2/2 urinary retention   wade placed in ed, draining well   continue IVF at 80cc/hr   avoid nephtrotoxic meds  monitor BMP in AM   nephro Dr. Murillo consulted, recs appreciated

## 2019-07-16 NOTE — H&P ADULT - NSHPPHYSICALEXAM_GEN_ALL_CORE
T(C): 36.7 (07-16-19 @ 20:58), Max: 36.7 (07-16-19 @ 20:58)  HR: 94 (07-16-19 @ 20:58) (90 - 94)  BP: 121/68 (07-16-19 @ 20:58) (121/68 - 125/65)  RR: 20 (07-16-19 @ 20:58) (17 - 20)  SpO2: 96% (07-16-19 @ 20:58) (95% - 96%)    GENERAL: patient appears well, no acute distress, appropriate, pleasant  EYES: sclera clear, no exudates  ENMT, moist mucous membranes  LUNGS: good air entry bilaterally, clear to auscultation, symmetric breath sounds, no wheezing or rhonchi appreciated  HEART: soft S1/S2, regular rate and rhythm, no murmurs noted, trace lower extremity edema  GASTROINTESTINAL: abdomen is soft, nontender, nondistended, normoactive bowel sounds, no palpable masses  INTEGUMENT: good skin turgor  NEUROLOGIC: awake, alert, oriented x3  PSYCHIATRIC: mood is good, affect is congruent, linear and logical thought process T(C): 36.7 (07-16-19 @ 20:58), Max: 36.7 (07-16-19 @ 20:58)  HR: 94 (07-16-19 @ 20:58) (90 - 94)  BP: 121/68 (07-16-19 @ 20:58) (121/68 - 125/65)  RR: 20 (07-16-19 @ 20:58) (17 - 20)  SpO2: 96% (07-16-19 @ 20:58) (95% - 96%)    GENERAL: patient appears well, no acute distress, appropriate, pleasant  EYES: sclera clear, no exudates  ENMT, moist mucous membranes  LUNGS: good air entry bilaterally, clear to auscultation, symmetric breath sounds, no wheezing or rhonchi appreciated  HEART: soft S1/S2, regular rate and rhythm, no murmurs noted, trace lower extremity edema  GASTROINTESTINAL: +healing midline incision, abdomen is soft, nontender, nondistended, normoactive bowel sounds, no palpable masses  INTEGUMENT: good skin turgor  NEUROLOGIC: awake, alert, oriented x3  PSYCHIATRIC: mood is good, affect is congruent, linear and logical thought process

## 2019-07-16 NOTE — H&P ADULT - PROBLEM SELECTOR PLAN 4
chronic  continue home dose proscar and flomax - hemoglobin around 13 during last admission. possibly trended down post op  - ?utility of fobt in setting of recent colon resection.   - check am cbc  - no S&S active bleeding  - if continues to trend down hold pharmacologic dvt ppx.

## 2019-07-16 NOTE — H&P ADULT - PROBLEM SELECTOR PLAN 7
chronic stable  Continue Atorvastatin 80mg qhs. Stable  - Continue Toprol 50mg qd  - Continue Procardia XL 60mg qd.

## 2019-07-16 NOTE — H&P ADULT - NSHPREVIEWOFSYSTEMS_GEN_ALL_CORE
CONSTITUTIONAL: denies fever, chills, fatigue, weakness  HEENT: denies blurred vision, sore throat  CARDIOVASCULAR: denies chest pain, chest pressure, palpitations  RESPIRATORY: denies shortness of breath, sputum production  GASTROINTESTINAL: denies nausea, vomiting, diarrhea, abdominal pain  GENITOURINARY: denies dysuria, discharge  NEUROLOGICAL: denies numbness, headache, focal weakness  MUSCULOSKELETAL: denies new joint pain, muscle aches  LYMPHATICS: denies enlarged lymph nodes, extremity swelling

## 2019-07-16 NOTE — H&P ADULT - PROBLEM SELECTOR PLAN 2
wade placed in ED, draining well  patient follows outpatient with Dr. Anthony wade to remain in place at this time wade placed in ED, draining well  patient follows outpatient with Dr. Anthony wade to remain in place at this time  can attempt TOV wade placed in ED, draining well  patient follows outpatient with Dr. Anthony wade to remain in place at this time  pt requesting pyridium as he uses it outpatient and believes it will decrease irritation of wade cath.   add lidocaine gel PRN for wade irritation wade placed in ED, draining well  patient follows outpatient with Dr. Anthony wade to remain in place at this time  note pt requesting pyridium as he uses it outpatient and believes it will decrease irritation of wade cath- at present urine is clear yellow.   add lidocaine gel PRN for wade irritation

## 2019-07-17 LAB
ANION GAP SERPL CALC-SCNC: 9 MMOL/L — SIGNIFICANT CHANGE UP (ref 5–17)
BUN SERPL-MCNC: 53 MG/DL — HIGH (ref 7–23)
CALCIUM SERPL-MCNC: 8.4 MG/DL — LOW (ref 8.5–10.1)
CHLORIDE SERPL-SCNC: 98 MMOL/L — SIGNIFICANT CHANGE UP (ref 96–108)
CO2 SERPL-SCNC: 24 MMOL/L — SIGNIFICANT CHANGE UP (ref 22–31)
CREAT SERPL-MCNC: 3.7 MG/DL — HIGH (ref 0.5–1.3)
CULTURE RESULTS: NO GROWTH — SIGNIFICANT CHANGE UP
GLUCOSE SERPL-MCNC: 100 MG/DL — HIGH (ref 70–99)
HCT VFR BLD CALC: 30.8 % — LOW (ref 39–50)
HCT VFR BLD CALC: 33.5 % — LOW (ref 39–50)
HGB BLD-MCNC: 10.8 G/DL — LOW (ref 13–17)
HGB BLD-MCNC: 11.6 G/DL — LOW (ref 13–17)
MAGNESIUM SERPL-MCNC: 2.7 MG/DL — HIGH (ref 1.6–2.6)
MCHC RBC-ENTMCNC: 31.2 PG — SIGNIFICANT CHANGE UP (ref 27–34)
MCHC RBC-ENTMCNC: 31.2 PG — SIGNIFICANT CHANGE UP (ref 27–34)
MCHC RBC-ENTMCNC: 34.6 GM/DL — SIGNIFICANT CHANGE UP (ref 32–36)
MCHC RBC-ENTMCNC: 35.1 GM/DL — SIGNIFICANT CHANGE UP (ref 32–36)
MCV RBC AUTO: 89 FL — SIGNIFICANT CHANGE UP (ref 80–100)
MCV RBC AUTO: 90.1 FL — SIGNIFICANT CHANGE UP (ref 80–100)
NRBC # BLD: 0 /100 WBCS — SIGNIFICANT CHANGE UP (ref 0–0)
NRBC # BLD: 0 /100 WBCS — SIGNIFICANT CHANGE UP (ref 0–0)
PHOSPHATE SERPL-MCNC: 5.3 MG/DL — HIGH (ref 2.5–4.5)
PLATELET # BLD AUTO: 272 K/UL — SIGNIFICANT CHANGE UP (ref 150–400)
PLATELET # BLD AUTO: 290 K/UL — SIGNIFICANT CHANGE UP (ref 150–400)
POTASSIUM SERPL-MCNC: 4.6 MMOL/L — SIGNIFICANT CHANGE UP (ref 3.5–5.3)
POTASSIUM SERPL-SCNC: 4.6 MMOL/L — SIGNIFICANT CHANGE UP (ref 3.5–5.3)
RBC # BLD: 3.46 M/UL — LOW (ref 4.2–5.8)
RBC # BLD: 3.72 M/UL — LOW (ref 4.2–5.8)
RBC # FLD: 14 % — SIGNIFICANT CHANGE UP (ref 10.3–14.5)
RBC # FLD: 14.2 % — SIGNIFICANT CHANGE UP (ref 10.3–14.5)
SODIUM SERPL-SCNC: 131 MMOL/L — LOW (ref 135–145)
SPECIMEN SOURCE: SIGNIFICANT CHANGE UP
WBC # BLD: 5.59 K/UL — SIGNIFICANT CHANGE UP (ref 3.8–10.5)
WBC # BLD: 6.86 K/UL — SIGNIFICANT CHANGE UP (ref 3.8–10.5)
WBC # FLD AUTO: 5.59 K/UL — SIGNIFICANT CHANGE UP (ref 3.8–10.5)
WBC # FLD AUTO: 6.86 K/UL — SIGNIFICANT CHANGE UP (ref 3.8–10.5)

## 2019-07-17 PROCEDURE — 99233 SBSQ HOSP IP/OBS HIGH 50: CPT

## 2019-07-17 RX ORDER — SODIUM CHLORIDE 9 MG/ML
1000 INJECTION INTRAMUSCULAR; INTRAVENOUS; SUBCUTANEOUS
Refills: 0 | Status: DISCONTINUED | OUTPATIENT
Start: 2019-07-17 | End: 2019-07-21

## 2019-07-17 RX ORDER — LIDOCAINE 4 G/100G
1 CREAM TOPICAL
Refills: 0 | Status: DISCONTINUED | OUTPATIENT
Start: 2019-07-17 | End: 2019-07-21

## 2019-07-17 RX ORDER — PHENAZOPYRIDINE HCL 100 MG
100 TABLET ORAL THREE TIMES A DAY
Refills: 0 | Status: DISCONTINUED | OUTPATIENT
Start: 2019-07-17 | End: 2019-07-18

## 2019-07-17 RX ADMIN — ATORVASTATIN CALCIUM 80 MILLIGRAM(S): 80 TABLET, FILM COATED ORAL at 21:07

## 2019-07-17 RX ADMIN — Medication 650 MILLIGRAM(S): at 13:05

## 2019-07-17 RX ADMIN — LIDOCAINE 1 APPLICATION(S): 4 CREAM TOPICAL at 12:08

## 2019-07-17 RX ADMIN — Medication 100 MILLIGRAM(S): at 13:04

## 2019-07-17 RX ADMIN — Medication 650 MILLIGRAM(S): at 06:13

## 2019-07-17 RX ADMIN — Medication 60 MILLIGRAM(S): at 06:01

## 2019-07-17 RX ADMIN — HEPARIN SODIUM 5000 UNIT(S): 5000 INJECTION INTRAVENOUS; SUBCUTANEOUS at 18:36

## 2019-07-17 RX ADMIN — Medication 81 MILLIGRAM(S): at 12:07

## 2019-07-17 RX ADMIN — TAMSULOSIN HYDROCHLORIDE 0.4 MILLIGRAM(S): 0.4 CAPSULE ORAL at 21:07

## 2019-07-17 RX ADMIN — Medication 100 MILLIGRAM(S): at 21:07

## 2019-07-17 RX ADMIN — Medication 650 MILLIGRAM(S): at 21:31

## 2019-07-17 RX ADMIN — HEPARIN SODIUM 5000 UNIT(S): 5000 INJECTION INTRAVENOUS; SUBCUTANEOUS at 06:01

## 2019-07-17 RX ADMIN — Medication 650 MILLIGRAM(S): at 12:08

## 2019-07-17 RX ADMIN — FINASTERIDE 5 MILLIGRAM(S): 5 TABLET, FILM COATED ORAL at 12:07

## 2019-07-17 RX ADMIN — Medication 650 MILLIGRAM(S): at 22:01

## 2019-07-17 RX ADMIN — Medication 50 MILLIGRAM(S): at 06:01

## 2019-07-17 RX ADMIN — SODIUM CHLORIDE 80 MILLILITER(S): 9 INJECTION INTRAMUSCULAR; INTRAVENOUS; SUBCUTANEOUS at 06:12

## 2019-07-17 NOTE — PROGRESS NOTE ADULT - PROBLEM SELECTOR PLAN 1
acute, cr <1 on last admission  admit to [facility]  likely 2/2 urinary retention   wade placed in ed, draining well   continue IVF    avoid nephtrotoxic meds  monitor BMP in AM   nephro Dr. Murillo consulted, recs appreciated

## 2019-07-17 NOTE — CONSULT NOTE ADULT - ASSESSMENT
Acute Kidney Injury  Hyponatremia  Hypocalcemia  Anemia    Likely 2/2 Urinary retention.  Has had TURPx2 in the past.  S/P wade with clear yellow urine draining.  CTM creatinine.  Suspect some mild underlying CKD.  His hyponatremia should resolve with the resolution of the obstruction.  UA nitrate positive with RBCs and protein however, No WBCs.  He denied any dysuria but did complain of hesitancy.  No acute indication for dialysis. Acute Kidney Injury  Hyponatremia  Hypocalcemia  Anemia  HTN    Likely 2/2 Urinary retention.  Has had TURPx2 in the past.  S/P wade with clear yellow urine draining.  CTM creatinine.  Suspect some mild underlying CKD.  His hyponatremia should resolve with the resolution of the obstruction.  UA nitrate positive with RBCs and protein however, No WBCs. Urine culture ordered. He denied any dysuria or hematuria but did complain of hesitancy.  No acute indication for dialysis.

## 2019-07-17 NOTE — PROGRESS NOTE ADULT - SUBJECTIVE AND OBJECTIVE BOX
Patient is a 89y old  Male who presents with a chief complaint of urinary retention jerry (2019 10:37)      INTERVAL HPI/OVERNIGHT EVENTS: 89M w/pmh of CAD s/p CABG (), b/l inguinal hernia repairs, enlarged prostate s/p TURP x2, HTN and HLD presenting with urinary frequency. Admitted with ARF and urinary retention.     MEDICATIONS  (STANDING):  aspirin  chewable 81 milliGRAM(s) Oral daily  atorvastatin 80 milliGRAM(s) Oral at bedtime  finasteride 5 milliGRAM(s) Oral daily  heparin  Injectable 5000 Unit(s) SubCutaneous every 12 hours  metoprolol succinate ER 50 milliGRAM(s) Oral daily  NIFEdipine XL 60 milliGRAM(s) Oral daily  phenazopyridine 100 milliGRAM(s) Oral three times a day  sodium chloride 0.9%. 1000 milliLiter(s) (80 mL/Hr) IV Continuous <Continuous>  tamsulosin 0.4 milliGRAM(s) Oral at bedtime    MEDICATIONS  (PRN):  acetaminophen   Tablet .. 650 milliGRAM(s) Oral every 6 hours PRN Mild Pain (1 - 3)  lidocaine 2% Gel 1 Application(s) Topical two times a day PRN wade discomfort      Allergies    No Known Allergies    Intolerances        REVIEW OF SYSTEMS:  CONSTITUTIONAL: No fever, weight loss, or fatigue  EYES: No eye pain, visual disturbances, or discharge  ENMT:  No difficulty hearing, tinnitus, vertigo; No sinus or throat pain  NECK: No pain or stiffness  BREASTS: No pain, masses, or nipple discharge  RESPIRATORY: No cough, wheezing, chills or hemoptysis; No shortness of breath  CARDIOVASCULAR: No chest pain, palpitations, dizziness, or leg swelling  GASTROINTESTINAL: No abdominal or epigastric pain. No nausea, vomiting, or hematemesis; No diarrhea or constipation. No melena or hematochezia.  GENITOURINARY: No dysuria, frequency, hematuria, or incontinence  NEUROLOGICAL: No headaches, memory loss, loss of strength, numbness, or tremors  SKIN: No itching, burning, rashes, or lesions   LYMPH NODES: No enlarged glands  ENDOCRINE: No heat or cold intolerance; No hair loss; No polydipsia or polyuria  MUSCULOSKELETAL: No joint pain or swelling; No muscle, back, or extremity pain  PSYCHIATRIC: No depression, anxiety, mood swings, or difficulty sleeping  HEME/LYMPH: No easy bruising, or bleeding gums  ALLERGY AND IMMUNOLOGIC: No hives or eczema    Vital Signs Last 24 Hrs  T(C): 36.7 (2019 08:46), Max: 36.7 (2019 20:58)  T(F): 98.1 (2019 08:46), Max: 98.1 (2019 08:46)  HR: 84 (2019 08:46) (84 - 94)  BP: 126/75 (2019 08:46) (121/68 - 126/75)  BP(mean): --  RR: 16 (2019 08:46) (16 - 20)  SpO2: 91% (2019 08:46) (91% - 96%)    PHYSICAL EXAM:  GENERAL: NAD, well-groomed, well-developed  HEAD:  Atraumatic, Normocephalic  EYES: EOMI, PERRLA, conjunctiva and sclera clear  ENMT: No tonsillar erythema, exudates, or enlargement; Moist mucous membranes, Good dentition, No lesions  NECK: Supple, No JVD, Normal thyroid  NERVOUS SYSTEM:  Alert & Oriented X3, Good concentration; Motor Strength 5/5 B/L upper and lower extremities; DTRs 2+ intact and symmetric  CHEST/LUNG: Clear to auscultation bilaterally; No rales, rhonchi, wheezing, or rubs  HEART: Regular rate and rhythm; No murmurs, rubs, or gallops  ABDOMEN: Soft, Nontender, Nondistended; Bowel sounds present  EXTREMITIES:  2+ Peripheral Pulses, No clubbing, cyanosis, or edema  LYMPH: No lymphadenopathy noted  SKIN: No rashes or lesions    LABS:                        11.6   6.86  )-----------( 290      ( 2019 08:51 )             33.5     2019 07:35    131    |  98     |  53     ----------------------------<  100    4.6     |  24     |  3.70     Ca    8.4        2019 07:35  Phos  5.3       2019 07:35  Mg     2.7       2019 07:35        Urinalysis Basic - ( 2019 19:32 )    Color: Melida / Appearance: Clear / S.015 / pH: x  Gluc: x / Ketone: Negative  / Bili: Moderate / Urobili: 1   Blood: x / Protein: 25 mg/dL / Nitrite: Positive   Leuk Esterase: Negative / RBC: 3-5 /HPF / WBC 0-2   Sq Epi: x / Non Sq Epi: Occasional / Bacteria: Few      CAPILLARY BLOOD GLUCOSE          RADIOLOGY & ADDITIONAL TESTS:    Imaging Personally Reviewed:  [x ] YES  [ ] NO    Consultant(s) Notes Reviewed:  [x ] YES  [ ] NO    Care Discussed with Consultants/Other Providers [x ] YES  [ ] NO

## 2019-07-17 NOTE — PROGRESS NOTE ADULT - PROBLEM SELECTOR PROBLEM 7
INR 2.32--2mg MWF 3 all other days of the week    Please advise      Previous INRs and orders;   3/22/2018 INR 3.09--2mg on Wed and Fri and 3mg all other days  2/22/2018 INR 2.64 Cont the same coumadin dose (2 mg Wed/Fri and 3 mg all other days). INR in 1month  1/25/18 INR 2.93, Cont the same coumadin dose (2 mg Wed/Fri and 3 mg all other days). INR in 1month  12/28/17 INR 2.93, Cont the same coumadin dose (2mg Wed and Fr and 3mg all other days), INR in 1 month  11/28/17 INR 2.09, Cont the same coumadin dose (2mg Wed and Fri and 3mg all other days.). INR in 1 month   HTN (hypertension)

## 2019-07-17 NOTE — PROGRESS NOTE ADULT - PROBLEM SELECTOR PLAN 3
improved.   - Pt is asymptomatic, not confused  - Continue normal saline IVF @ 80 cc/hr for 12 hours. BMP in AM.

## 2019-07-17 NOTE — PROGRESS NOTE ADULT - PROBLEM SELECTOR PLAN 2
wade placed in ED, draining well  patient follows outpatient with Dr. Anthony wade to remain in place at this time  note pt requesting pyridium as he uses it outpatient and believes it will decrease irritation of wade cath- at present urine is clear yellow.   add lidocaine gel PRN for wade irritation

## 2019-07-17 NOTE — CONSULT NOTE ADULT - SUBJECTIVE AND OBJECTIVE BOX
Patient is a 89y old  Male who presents with a chief complaint of urinary retention jerry (2019 21:21)       HPI:  89M w/pmh of CAD s/p CABG (), b/l inguinal hernia repairs, enlarged prostate s/p TURP x2, HTN and HLD presenting with urinary frequency. Patient states that for the last 3-4 days he has felt the urge to urinate frequently but when he attempts to go he can only eliminate small dribbles of urine. He went to his outpatient urologist Dr. Cruz and was placed on bactrim for possible prostatitis. Patient had no relief from his symptoms. Per patient he had a bladder u/s in his urologist office which did not show signs of retention. 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 Pt also was note to having some urinary discomfort and was seen by urology, his symptoms resolved and was instructed outpatient follow up with Dr Cruz primary outpatient urologist. Patient denies fever, chills, dysuria, urgency. Denies abdominal pain, nausea, vomiting.     In the ED   VS: WNL  bladder scan performed in ED and wade placed  Labs: Na 127, Cl 95, BUN 60, Cr 4.00, GFR 12. UA 3-5RBC, few bacteria, trace blood, moderate bilirubin, nitrite +.   EKG: NSR, LAD.   CXR: Status post CABG. Small left pleural effusion again seen.  Found to be in urinary retention, wade catheter placed in ED. (2019 21:21)       PAST MEDICAL & SURGICAL HISTORY:  CAD (coronary artery disease): s/p CABG,   Anginal pain  HTN (hypertension)  Enlarged prostate  H/O hemicolectomy  H/O inguinal hernia repair: bilateral  S/P TURP: x2       FAMILY HISTORY:  No pertinent family history in first degree relatives  [place]    Social History:Non smoker    MEDICATIONS  (STANDING):  aspirin  chewable 81 milliGRAM(s) Oral daily  atorvastatin 80 milliGRAM(s) Oral at bedtime  finasteride 5 milliGRAM(s) Oral daily  heparin  Injectable 5000 Unit(s) SubCutaneous every 12 hours  metoprolol succinate ER 50 milliGRAM(s) Oral daily  NIFEdipine XL 60 milliGRAM(s) Oral daily  phenazopyridine 100 milliGRAM(s) Oral three times a day  sodium chloride 0.9%. 1000 milliLiter(s) (80 mL/Hr) IV Continuous <Continuous>  tamsulosin 0.4 milliGRAM(s) Oral at bedtime    MEDICATIONS  (PRN):  acetaminophen   Tablet .. 650 milliGRAM(s) Oral every 6 hours PRN Mild Pain (1 - 3)  lidocaine 2% Gel 1 Application(s) Topical two times a day PRN wade discomfort   Meds reviewed    Allergies    No Known Allergies    Intolerances         REVIEW OF SYSTEMS:    CONSTITUTIONAL:  No weight loss   EYES: No eye pain, visual disturbances, or discharge  ENMT:  No difficulty hearing, tinnitus, vertigo; No sinus or throat pain  NECK: No pain or stiffness  BREASTS: No pain, masses, or nipple discharge  RESPIRATORY: No SOB. No wheeze. No JUAREZ  CARDIOVASCULAR: No chest pain, palpitations, dizziness,   GASTROINTESTINAL: No abdominal or epigastric pain. No nausea, vomiting, or hematemesis; No diarrhea or constipation. No melena or hematochezia.Trunckal obesity  GENITOURINARY: No dysuria, frequency, hematuria, or incontinence  NEUROLOGICAL: No headaches, memory loss, loss of strength, numbness, or tremors  SKIN: Diffuse erythema, no blisters  LYMPH NODES: No enlarged glands  ENDOCRINE: No heat or cold intolerance; No hair loss  MUSCULOSKELETAL: No joint pain or swelling   PSYCHIATRIC: No depression, anxiety, mood swings, or difficulty sleeping  HEME/LYMPH: No easy bruising, or bleeding gums  ALLERY AND IMMUNOLOGIC: No hives or eczema      Vital Signs Last 24 Hrs  T(C): 36.7 (2019 08:46), Max: 36.7 (2019 20:58)  T(F): 98.1 (2019 08:46), Max: 98.1 (2019 08:46)  HR: 84 (2019 08:46) (84 - 94)  BP: 126/75 (2019 08:46) (121/68 - 126/75)  BP(mean): --  RR: 16 (2019 08:46) (16 - 20)  SpO2: 91% (2019 08:46) (91% - 96%)  Daily     Daily     PHYSICAL EXAM:    GENERAL: NAD  HEAD:  Atraumatic, Normocephalic  EYES: EOMI, conjunctiva and sclera clear  ENMT: No drainage from ears. no drainge from nares,   NECK: no obvious masses  NERVOUS SYSTEM:  Alert & Awake, Good concentration;   CHEST/LUNG: CTAB, no rales, rhonchi, wheezing, or rubs  HEART: Regular rate and rhythm; No murmurs, rubs, or gallops  ABDOMEN: Soft, Nontender, Nondistended; Bowel sounds present,   EXTREMITIES: No Edema  SKIN: No rashes or lesions, dry warm, no ecchymosis      LABS:                        11.6   6.86  )-----------( 290      ( 2019 08:51 )             33.5     -    131<L>  |  98  |  53<H>  ----------------------------<  100<H>  4.6   |  24  |  3.70<H>    Ca    8.4<L>      2019 07:35  Phos  5.3       Mg     2.7             Urinalysis Basic - ( 2019 19:32 )    Color: Melida / Appearance: Clear / S.015 / pH: x  Gluc: x / Ketone: Negative  / Bili: Moderate / Urobili: 1   Blood: x / Protein: 25 mg/dL / Nitrite: Positive   Leuk Esterase: Negative / RBC: 3-5 /HPF / WBC 0-2   Sq Epi: x / Non Sq Epi: Occasional / Bacteria: Few      Magnesium, Serum: 2.7 mg/dL ( @ 07:35)  Phosphorus Level, Serum: 5.3 mg/dL ( @ 07:35) Patient is a 89y old  Male who presents with a chief complaint of urinary retention jerry (2019 21:21)       HPI:  89M w/pmh of CAD s/p CABG (), b/l inguinal hernia repairs, enlarged prostate s/p TURP x2, HTN and HLD presenting with urinary frequency. Patient states that for the last 3-4 days he has felt the urge to urinate frequently but when he attempts to go he can only eliminate small dribbles of urine. He went to his outpatient urologist Dr. Cruz and was placed on bactrim for possible prostatitis. Patient had no relief from his symptoms. Per patient he had a bladder u/s in his urologist office which did not show signs of retention. 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 Pt also was note to having some urinary discomfort and was seen by urology, his symptoms resolved and was instructed outpatient follow up with Dr Cruz primary outpatient urologist. Patient denies fever, chills, dysuria, urgency. Denies abdominal pain, nausea, vomiting.  States he did notice decreased urination over the last 3-4 days.  He would take sitz baths and would be able to urinate at that time but otherwise was unable to urinate a significant amount.  He started taking pyridium approx 1 week ago.      In the ED   VS: WNL  bladder scan performed in ED and wade placed  Labs: Na 127, Cl 95, BUN 60, Cr 4.00, GFR 12. UA 3-5RBC, few bacteria, trace blood, moderate bilirubin, nitrite +.   EKG: NSR, LAD.   CXR: Status post CABG. Small left pleural effusion again seen.  Found to be in urinary retention, wade catheter placed in ED. (2019 21:21)       PAST MEDICAL & SURGICAL HISTORY:  CAD (coronary artery disease): s/p CABG,   Anginal pain  HTN (hypertension)  Enlarged prostate  H/O hemicolectomy  H/O inguinal hernia repair: bilateral  S/P TURP: x2       FAMILY HISTORY:  No family history of kidney disease    Social History:Non smoker    MEDICATIONS  (STANDING):  aspirin  chewable 81 milliGRAM(s) Oral daily  atorvastatin 80 milliGRAM(s) Oral at bedtime  finasteride 5 milliGRAM(s) Oral daily  heparin  Injectable 5000 Unit(s) SubCutaneous every 12 hours  metoprolol succinate ER 50 milliGRAM(s) Oral daily  NIFEdipine XL 60 milliGRAM(s) Oral daily  phenazopyridine 100 milliGRAM(s) Oral three times a day  sodium chloride 0.9%. 1000 milliLiter(s) (80 mL/Hr) IV Continuous <Continuous>  tamsulosin 0.4 milliGRAM(s) Oral at bedtime    MEDICATIONS  (PRN):  acetaminophen   Tablet .. 650 milliGRAM(s) Oral every 6 hours PRN Mild Pain (1 - 3)  lidocaine 2% Gel 1 Application(s) Topical two times a day PRN wade discomfort   Meds reviewed    Allergies    No Known Allergies    Intolerances         REVIEW OF SYSTEMS:    CONSTITUTIONAL:  No weight loss   EYES: No eye pain, visual disturbances, or discharge  ENMT:  No difficulty hearing, tinnitus, vertigo; No sinus or throat pain  NECK: No pain or stiffness  BREASTS: No pain, masses, or nipple discharge  RESPIRATORY: No SOB. No wheeze. No JUAREZ  CARDIOVASCULAR: No chest pain, palpitations, dizziness,   GASTROINTESTINAL: + abdominal or epigastric pain. No nausea, vomiting, or hematemesis; No diarrhea or constipation.   GENITOURINARY: No dysuria, frequency, hematuria, or incontinence, + hesitancy   NEUROLOGICAL: No headaches, memory loss, loss of strength, numbness, or tremors  SKIN: Diffuse erythema, no blisters  ENDOCRINE: No heat or cold intolerance; No hair loss  MUSCULOSKELETAL: No joint pain or swelling   ALLERY AND IMMUNOLOGIC: No hives or eczema      Vital Signs Last 24 Hrs  T(C): 36.7 (2019 08:46), Max: 36.7 (2019 20:58)  T(F): 98.1 (2019 08:46), Max: 98.1 (2019 08:46)  HR: 84 (2019 08:46) (84 - 94)  BP: 126/75 (2019 08:46) (121/68 - 126/75)  BP(mean): --  RR: 16 (2019 08:46) (16 - 20)  SpO2: 91% (2019 08:46) (91% - 96%)  Daily     Daily     PHYSICAL EXAM:    GENERAL: NAD  HEAD:  Atraumatic, Normocephalic  EYES: EOMI, conjunctiva and sclera clear  ENMT: No drainage from ears. no drainge from nares,   NECK: no obvious masses  NERVOUS SYSTEM:  Alert & Awake, Good concentration;   CHEST/LUNG: CTAB, no rales, rhonchi, wheezing, or rubs  HEART: Regular rate and rhythm; No murmurs, rubs, or gallops  ABDOMEN: Soft, Nontender, Nondistended; Bowel sounds present,   EXTREMITIES: No Edema  SKIN: No rashes or lesions, dry warm, no ecchymosis      LABS:                        11.6   6.86  )-----------( 290      ( 2019 08:51 )             33.5         131<L>  |  98  |  53<H>  ----------------------------<  100<H>  4.6   |  24  |  3.70<H>    Ca    8.4<L>      2019 07:35  Phos  5.3       Mg     2.7             Urinalysis Basic - ( 2019 19:32 )    Color: Melida / Appearance: Clear / S.015 / pH: x  Gluc: x / Ketone: Negative  / Bili: Moderate / Urobili: 1   Blood: x / Protein: 25 mg/dL / Nitrite: Positive   Leuk Esterase: Negative / RBC: 3-5 /HPF / WBC 0-2   Sq Epi: x / Non Sq Epi: Occasional / Bacteria: Few      Magnesium, Serum: 2.7 mg/dL ( @ 07:35)  Phosphorus Level, Serum: 5.3 mg/dL ( @ 07:35)

## 2019-07-18 LAB
ANION GAP SERPL CALC-SCNC: 9 MMOL/L — SIGNIFICANT CHANGE UP (ref 5–17)
BUN SERPL-MCNC: 51 MG/DL — HIGH (ref 7–23)
CALCIUM SERPL-MCNC: 8.1 MG/DL — LOW (ref 8.5–10.1)
CHLORIDE SERPL-SCNC: 105 MMOL/L — SIGNIFICANT CHANGE UP (ref 96–108)
CO2 SERPL-SCNC: 23 MMOL/L — SIGNIFICANT CHANGE UP (ref 22–31)
CREAT SERPL-MCNC: 3.2 MG/DL — HIGH (ref 0.5–1.3)
GLUCOSE SERPL-MCNC: 94 MG/DL — SIGNIFICANT CHANGE UP (ref 70–99)
HCT VFR BLD CALC: 31.2 % — LOW (ref 39–50)
HGB BLD-MCNC: 10.7 G/DL — LOW (ref 13–17)
MCHC RBC-ENTMCNC: 31.2 PG — SIGNIFICANT CHANGE UP (ref 27–34)
MCHC RBC-ENTMCNC: 34.3 GM/DL — SIGNIFICANT CHANGE UP (ref 32–36)
MCV RBC AUTO: 91 FL — SIGNIFICANT CHANGE UP (ref 80–100)
NRBC # BLD: 0 /100 WBCS — SIGNIFICANT CHANGE UP (ref 0–0)
PLATELET # BLD AUTO: 300 K/UL — SIGNIFICANT CHANGE UP (ref 150–400)
POTASSIUM SERPL-MCNC: 4.6 MMOL/L — SIGNIFICANT CHANGE UP (ref 3.5–5.3)
POTASSIUM SERPL-SCNC: 4.6 MMOL/L — SIGNIFICANT CHANGE UP (ref 3.5–5.3)
RBC # BLD: 3.43 M/UL — LOW (ref 4.2–5.8)
RBC # FLD: 14.4 % — SIGNIFICANT CHANGE UP (ref 10.3–14.5)
SODIUM SERPL-SCNC: 137 MMOL/L — SIGNIFICANT CHANGE UP (ref 135–145)
WBC # BLD: 5.47 K/UL — SIGNIFICANT CHANGE UP (ref 3.8–10.5)
WBC # FLD AUTO: 5.47 K/UL — SIGNIFICANT CHANGE UP (ref 3.8–10.5)

## 2019-07-18 PROCEDURE — 76770 US EXAM ABDO BACK WALL COMP: CPT | Mod: 26

## 2019-07-18 PROCEDURE — 99233 SBSQ HOSP IP/OBS HIGH 50: CPT

## 2019-07-18 RX ORDER — SENNA PLUS 8.6 MG/1
2 TABLET ORAL AT BEDTIME
Refills: 0 | Status: DISCONTINUED | OUTPATIENT
Start: 2019-07-18 | End: 2019-07-21

## 2019-07-18 RX ORDER — POLYETHYLENE GLYCOL 3350 17 G/17G
17 POWDER, FOR SOLUTION ORAL DAILY
Refills: 0 | Status: DISCONTINUED | OUTPATIENT
Start: 2019-07-18 | End: 2019-07-21

## 2019-07-18 RX ORDER — DOCUSATE SODIUM 100 MG
100 CAPSULE ORAL
Refills: 0 | Status: DISCONTINUED | OUTPATIENT
Start: 2019-07-18 | End: 2019-07-21

## 2019-07-18 RX ADMIN — SODIUM CHLORIDE 75 MILLILITER(S): 9 INJECTION INTRAMUSCULAR; INTRAVENOUS; SUBCUTANEOUS at 17:08

## 2019-07-18 RX ADMIN — Medication 650 MILLIGRAM(S): at 20:25

## 2019-07-18 RX ADMIN — LIDOCAINE 1 APPLICATION(S): 4 CREAM TOPICAL at 20:25

## 2019-07-18 RX ADMIN — Medication 81 MILLIGRAM(S): at 11:21

## 2019-07-18 RX ADMIN — FINASTERIDE 5 MILLIGRAM(S): 5 TABLET, FILM COATED ORAL at 11:21

## 2019-07-18 RX ADMIN — Medication 100 MILLIGRAM(S): at 06:02

## 2019-07-18 RX ADMIN — HEPARIN SODIUM 5000 UNIT(S): 5000 INJECTION INTRAVENOUS; SUBCUTANEOUS at 06:02

## 2019-07-18 RX ADMIN — Medication 650 MILLIGRAM(S): at 21:25

## 2019-07-18 RX ADMIN — Medication 60 MILLIGRAM(S): at 06:02

## 2019-07-18 RX ADMIN — HEPARIN SODIUM 5000 UNIT(S): 5000 INJECTION INTRAVENOUS; SUBCUTANEOUS at 17:07

## 2019-07-18 RX ADMIN — TAMSULOSIN HYDROCHLORIDE 0.4 MILLIGRAM(S): 0.4 CAPSULE ORAL at 21:29

## 2019-07-18 RX ADMIN — Medication 50 MILLIGRAM(S): at 06:02

## 2019-07-18 RX ADMIN — Medication 100 MILLIGRAM(S): at 17:26

## 2019-07-18 RX ADMIN — ATORVASTATIN CALCIUM 80 MILLIGRAM(S): 80 TABLET, FILM COATED ORAL at 21:29

## 2019-07-18 RX ADMIN — SENNA PLUS 2 TABLET(S): 8.6 TABLET ORAL at 21:29

## 2019-07-18 RX ADMIN — SODIUM CHLORIDE 75 MILLILITER(S): 9 INJECTION INTRAMUSCULAR; INTRAVENOUS; SUBCUTANEOUS at 06:02

## 2019-07-18 NOTE — PROGRESS NOTE ADULT - SUBJECTIVE AND OBJECTIVE BOX
Patient is a 89y old  Male who presents with a chief complaint of urinary retention jerry (2019 10:43)      INTERVAL HPI/OVERNIGHT EVENTS: 89M w/pmh of CAD s/p CABG (), b/l inguinal hernia repairs, enlarged prostate s/p TURP x2, HTN and HLD presenting with urinary frequency. Admitted with ARF and urinary retention. pt feels better.     MEDICATIONS  (STANDING):  aspirin  chewable 81 milliGRAM(s) Oral daily  atorvastatin 80 milliGRAM(s) Oral at bedtime  finasteride 5 milliGRAM(s) Oral daily  heparin  Injectable 5000 Unit(s) SubCutaneous every 12 hours  metoprolol succinate ER 50 milliGRAM(s) Oral daily  NIFEdipine XL 60 milliGRAM(s) Oral daily  sodium chloride 0.9%. 1000 milliLiter(s) (75 mL/Hr) IV Continuous <Continuous>  sodium chloride 0.9%. 1000 milliLiter(s) (80 mL/Hr) IV Continuous <Continuous>  tamsulosin 0.4 milliGRAM(s) Oral at bedtime    MEDICATIONS  (PRN):  acetaminophen   Tablet .. 650 milliGRAM(s) Oral every 6 hours PRN Mild Pain (1 - 3)  lidocaine 2% Gel 1 Application(s) Topical two times a day PRN wade discomfort      Allergies    No Known Allergies    Intolerances        REVIEW OF SYSTEMS:  CONSTITUTIONAL: No fever, weight loss, or fatigue  EYES: No eye pain, visual disturbances, or discharge  ENMT:  No difficulty hearing, tinnitus, vertigo; No sinus or throat pain  NECK: No pain or stiffness  BREASTS: No pain, masses, or nipple discharge  RESPIRATORY: No cough, wheezing, chills or hemoptysis; No shortness of breath  CARDIOVASCULAR: No chest pain, palpitations, dizziness, or leg swelling  GASTROINTESTINAL: No abdominal or epigastric pain. No nausea, vomiting, or hematemesis; No diarrhea or constipation. No melena or hematochezia.  GENITOURINARY: No dysuria, frequency, hematuria, or incontinence  NEUROLOGICAL: No headaches, memory loss, loss of strength, numbness, or tremors  SKIN: No itching, burning, rashes, or lesions   LYMPH NODES: No enlarged glands  ENDOCRINE: No heat or cold intolerance; No hair loss; No polydipsia or polyuria  MUSCULOSKELETAL: No joint pain or swelling; No muscle, back, or extremity pain  PSYCHIATRIC: No depression, anxiety, mood swings, or difficulty sleeping  HEME/LYMPH: No easy bruising, or bleeding gums  ALLERGY AND IMMUNOLOGIC: No hives or eczema    Vital Signs Last 24 Hrs  T(C): 36.8 (2019 05:52), Max: 37.1 (2019 20:13)  T(F): 98.3 (2019 05:52), Max: 98.7 (2019 20:13)  HR: 103 (2019 05:52) (80 - 103)  BP: 136/78 (2019 05:52) (102/65 - 142/73)  BP(mean): --  RR: 18 (2019 05:52) (17 - 18)  SpO2: 93% (2019 05:52) (90% - 93%)    PHYSICAL EXAM:  GENERAL: NAD, well-groomed, well-developed  HEAD:  Atraumatic, Normocephalic  EYES: EOMI, PERRLA, conjunctiva and sclera clear  ENMT: No tonsillar erythema, exudates, or enlargement; Moist mucous membranes, Good dentition, No lesions  NECK: Supple, No JVD, Normal thyroid  NERVOUS SYSTEM:  Alert & Oriented X3, Good concentration; Motor Strength 5/5 B/L upper and lower extremities; DTRs 2+ intact and symmetric  CHEST/LUNG: Clear to auscultation bilaterally; No rales, rhonchi, wheezing, or rubs  HEART: Regular rate and rhythm; No murmurs, rubs, or gallops  ABDOMEN: Soft, Nontender, Nondistended; Bowel sounds present  EXTREMITIES:  2+ Peripheral Pulses, No clubbing, cyanosis, or edema  LYMPH: No lymphadenopathy noted  SKIN: No rashes or lesions    LABS:                        10.7   5.47  )-----------( 300      ( 2019 07:39 )             31.2     2019 07:39    137    |  105    |  51     ----------------------------<  94     4.6     |  23     |  3.20     Ca    8.1        2019 07:39        Urinalysis Basic - ( 2019 19:32 )    Color: Melida / Appearance: Clear / S.015 / pH: x  Gluc: x / Ketone: Negative  / Bili: Moderate / Urobili: 1   Blood: x / Protein: 25 mg/dL / Nitrite: Positive   Leuk Esterase: Negative / RBC: 3-5 /HPF / WBC 0-2   Sq Epi: x / Non Sq Epi: Occasional / Bacteria: Few      CAPILLARY BLOOD GLUCOSE          RADIOLOGY & ADDITIONAL TESTS:    Imaging Personally Reviewed:  [x ] YES  [ ] NO    Consultant(s) Notes Reviewed:  [x ] YES  [ ] NO    Care Discussed with Consultants/Other Providers [x ] YES  [ ] NO

## 2019-07-18 NOTE — CONSULT NOTE ADULT - SUBJECTIVE AND OBJECTIVE BOX
CHIEF COMPLAINT:  89y Male with chief complaint of urinary retention         HISTORY OF PRESENT ILLNESS:  89M w/pmh of CAD s/p CABG (), b/l inguinal hernia repairs, enlarged prostate s/p TURP x2, HTN and HLD presenting with urinary frequency. Patient states that for the last 3-4 days he has felt the urge to urinate frequently but when he attempts to go he can only eliminate small dribbles of urine. He went to his outpatient urologist Dr. Cruz and was placed on bactrim for possible prostatitis. Patient had no relief from his symptoms. Per patient he had a bladder u/s in his urologist office which did not show signs of retention. 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 Pt also was note to having some urinary discomfort and was seen by urology, his symptoms resolved and was instructed outpatient follow up with Dr Cruz primary outpatient urologist. Patient denies fever, chills, dysuria, urgency. Denies abdominal pain, nausea, vomiting    *************************************************************************************************  PAST MEDICAL & SURGICAL HISTORY:  CAD (coronary artery disease): s/p CABG,   Anginal pain  HTN (hypertension)  Enlarged prostate  H/O hemicolectomy  H/O inguinal hernia repair: bilateral  S/P TURP: x2      MEDICATIONS  (STANDING):  aspirin  chewable 81 milliGRAM(s) Oral daily  atorvastatin 80 milliGRAM(s) Oral at bedtime  finasteride 5 milliGRAM(s) Oral daily  heparin  Injectable 5000 Unit(s) SubCutaneous every 12 hours  metoprolol succinate ER 50 milliGRAM(s) Oral daily  NIFEdipine XL 60 milliGRAM(s) Oral daily  phenazopyridine 100 milliGRAM(s) Oral three times a day  sodium chloride 0.9%. 1000 milliLiter(s) (75 mL/Hr) IV Continuous <Continuous>  sodium chloride 0.9%. 1000 milliLiter(s) (80 mL/Hr) IV Continuous <Continuous>  tamsulosin 0.4 milliGRAM(s) Oral at bedtime    MEDICATIONS  (PRN):  acetaminophen   Tablet .. 650 milliGRAM(s) Oral every 6 hours PRN Mild Pain (1 - 3)  lidocaine 2% Gel 1 Application(s) Topical two times a day PRN wade discomfort      ALLERGIES:  No Known Allergies      SOCIAL HISTORY:          ETOH:                                  Smoking:                                   Drugs:                                         Occupation:    FAMILY HISTORY:  No pertinent family history in first degree relatives      CONSTITUTIONAL: No weakness, fevers or chills    EYES/ENT: No visual changes;  No vertigo or throat pain     NECK: No pain or stiffness    RESPIRATORY: No cough, wheezing, hemoptysis; No shortness of breath    CARDIOVASCULAR: No chest pain or palpitations    GASTROINTESTINAL: No abdominal or epigastric pain. No nausea, vomiting, or hematemesis; No diarrhea or constipation. No melena or hematochezia.    GENITOURINARY:  hx hesitancy     NEUROLOGICAL: No numbness or weakness    SKIN: No itching, burning, rashes, or lesions     All other review of systems is negative unless indicated above.    ****************************************************************************************************  PHYSICAL EXAM:    Vital Signs Last 24 Hrs  T(C): 36.8 (2019 05:52), Max: 37.1 (2019 20:13)  T(F): 98.3 (2019 05:52), Max: 98.7 (2019 20:13)  HR: 103 (2019 05:52) (80 - 103)  BP: 136/78 (2019 05:52) (102/65 - 142/73)  BP(mean): --  RR: 18 (2019 05:52) (17 - 18)  SpO2: 93% (2019 05:52) (90% - 93%)    GENERAL: alert     PENIS: circ with wade  urine pyridium stained red    TESTES: soft     PROSTATE/ RECTAL: neg  20 gms    ABDOMEN: soft  cherise groin scars and midline scar fresher     BACK: no cva tenderness    LOWER EXTREMITIES: no edema     NEUROLOGICAL: alert       *******************************************************************************************************  LABS:                        10.7   5.47  )-----------( 300      ( 2019 07:39 )             31.2     07-18    137  |  105  |  51<H>  ----------------------------<  94  4.6   |  23  |  3.20<H>    Ca    8.1<L>      2019 07:39  Phos  5.3     -  Mg     2.7             Urinalysis Basic - ( 2019 19:32 )    Color: Melida / Appearance: Clear / S.015 / pH: x  Gluc: x / Ketone: Negative  / Bili: Moderate / Urobili: 1   Blood: x / Protein: 25 mg/dL / Nitrite: Positive   Leuk Esterase: Negative / RBC: 3-5 /HPF / WBC 0-2   Sq Epi: x / Non Sq Epi: Occasional / Bacteria: Few      Urine Culture:  @ 22:57  Urine Culure Results   No growth  Organism --      RADIOLOGY & ADDITIONAL STUDIES:  EXAM:  CT ABDOMEN AND PELVIS IC                        FROM PRIOR ADMIT     PROCEDURE DATE:  2019          INTERPRETATION:  CLINICAL INFORMATION: Abdominal pain    COMPARISON: 2017    PROCEDURE:   CT of the Abdomen and Pelvis was performed with intravenous contrast.   Intravenous contrast: 100 ml Omnipaque 350. 0 ml discarded.  Oral contrast: None.  Sagittal and coronal reformats were performed.    FINDINGS:    LOWER CHEST: Mild elevation of the right hemidiaphragm. Bibasilar   platelike atelectasisas well as questionable superimposed patchy   opacities in the left lower lobe. Trace bilateral pleural effusions.   Status post sternotomy and CABG    LIVER: Within normal limits.  BILE DUCTS: Normal caliber.  GALLBLADDER: Underdistended.  SPLEEN: Within normal limits.  PANCREAS: Within normal limits.  ADRENALS: Within normal limits.  KIDNEYS/URETERS: Within normal limits.    BLADDER: Within normal limits.  REPRODUCTIVE ORGANS: Within normal limits.    BOWEL: The cecum is dilated and folded on itself in the right upper   quadrant, changed in location from prior CT. Caliber change is seen on   series 2, image 69. There is wall thickening involving the terminal   ileum, cecum and proximal ascending colon. Appendix is distended and   air-filledmeasuring up to 1.3 cm at the tip.   PERITONEUM: Small upper abdominal ascites.  VESSELS:  Atherosclerotic changes of the aorta and branching vessels.  RETROPERITONEUM: No lymphadenopathy.    ABDOMINAL WALL: Status post right inguinal hernia repair. Surgical clips   are seen in the left inguinal region.  BONES: Within normal limits.    IMPRESSION:     Cecal bascule, as described above. The appendix is dilated and filled   with air.    Wall thickening involving the terminal ileum, cecum and proximal   ascending colon.    Small right upper quadrant ascites.    Bibasilar platelike atelectasis with questionable superimposed patchy   opacities in the left lower lobe.    Above critical findings were discussed with Dr. Harrell on 2019 at 5:41   PM.                    MELY MALDONADO M.D., ATTENDING RADIOLOGIST  This document has been electronically signed. 2019  5:43PM                  *********************************************************************************************************  IMPRESSION: urinary retention  obstructive uropathy jerry    PLAN: has wade   stop pyridium   renal sonogram/Bladder sono r/o hydro   on flomax and proscar   and rocephin

## 2019-07-18 NOTE — PROGRESS NOTE ADULT - PROBLEM SELECTOR PLAN 4
- hemoglobin around 13 during last admission. possibly trended down post op  - ?utility of fobt in setting of recent colon resection.   - check am cbc  - no S&S active bleeding  - if continues to trend down hold pharmacologic dvt ppx.

## 2019-07-18 NOTE — GOALS OF CARE CONVERSATION - PERSONAL ADVANCE DIRECTIVE - CONVERSATION DETAILS
met pt with wife, Pat, pt has hcp at home, unsure if able to locate while in hospital. pt also has living will, will relook documents at home. PC RN contact # given.

## 2019-07-18 NOTE — PROGRESS NOTE ADULT - PROBLEM SELECTOR PLAN 2
wade placed in ED, draining well  patient follows outpatient with Dr. Anthony wade to remain in place at this time  note pt requesting pyridium as he uses it outpatient and believes it will decrease irritation of wade cath- at present urine is clear yellow.   add lidocaine gel PRN for wade irritation  consulted with urology, recs US,

## 2019-07-18 NOTE — PROGRESS NOTE ADULT - PROVIDER SPECIALTY LIST ADULT
HAS BEEN TRYING TO PULL AT [facility]. ATTENDS CHANGED, NO STOOL BUT PASSING
GAS. PT GRABBED AT L HIP AND RESISITED TURNING. AFTERWARD GAVE PT WATER AND
DRANK WITHOUT PROBLEM. GIVEN 1 NORCO PO WITH PUDDING AND THEN PT ATE ALL OF
PUDDING WITHOUT PROBLEM. WILL OCC SAY A FEW WORDS. GOOD PULSES TO L FOOT. Hospitalist

## 2019-07-18 NOTE — PROGRESS NOTE ADULT - SUBJECTIVE AND OBJECTIVE BOX
NEPHROLOGY INTERVAL HPI/OVERNIGHT EVENTS:  HPI:  89M w/pmh of CAD s/p CABG (), b/l inguinal hernia repairs, enlarged prostate s/p TURP x2, HTN and HLD presenting with urinary frequency. Patient states that for the last 3-4 days he has felt the urge to urinate frequently but when he attempts to go he can only eliminate small dribbles of urine. He went to his outpatient urologist Dr. Cruz and was placed on bactrim for possible prostatitis. Patient had no relief from his symptoms. Per patient he had a bladder u/s in his urologist office which did not show signs of retention. 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 Pt also was note to having some urinary discomfort and was seen by urology, his symptoms resolved and was instructed outpatient follow up with Dr Cruz primary outpatient urologist. Patient denies fever, chills, dysuria, urgency. Denies abdominal pain, nausea, vomiting.     In the ED   VS: WNL  bladder scan performed in ED and wade placed  Labs: Na 127, Cl 95, BUN 60, Cr 4.00, GFR 12. UA 3-5RBC, few bacteria, trace blood, moderate bilirubin, nitrite +.   EKG: NSR, LAD.   CXR: Status post CABG. Small left pleural effusion again seen.  Found to be in urinary retention, wade catheter placed in ED. (2019 21:21)      PAST MEDICAL & SURGICAL HISTORY:  CAD (coronary artery disease): s/p CABG,   Anginal pain  HTN (hypertension)  Enlarged prostate  H/O hemicolectomy  H/O inguinal hernia repair: bilateral  S/P TURP: x2      MEDICATIONS  (STANDING):  aspirin  chewable 81 milliGRAM(s) Oral daily  atorvastatin 80 milliGRAM(s) Oral at bedtime  finasteride 5 milliGRAM(s) Oral daily  heparin  Injectable 5000 Unit(s) SubCutaneous every 12 hours  metoprolol succinate ER 50 milliGRAM(s) Oral daily  NIFEdipine XL 60 milliGRAM(s) Oral daily  sodium chloride 0.9%. 1000 milliLiter(s) (75 mL/Hr) IV Continuous <Continuous>  sodium chloride 0.9%. 1000 milliLiter(s) (80 mL/Hr) IV Continuous <Continuous>  tamsulosin 0.4 milliGRAM(s) Oral at bedtime    MEDICATIONS  (PRN):  acetaminophen   Tablet .. 650 milliGRAM(s) Oral every 6 hours PRN Mild Pain (1 - 3)  lidocaine 2% Gel 1 Application(s) Topical two times a day PRN wade discomfort      Allergies    No Known Allergies    Intolerances        Vital Signs Last 24 Hrs  T(C): 36.8 (2019 14:29), Max: 37.1 (2019 20:13)  T(F): 98.2 (2019 14:29), Max: 98.7 (2019 20:13)  HR: 99 (2019 14:29) (96 - 103)  BP: 128/75 (2019 14:29) (128/75 - 142/73)  BP(mean): --  RR: 18 (2019 14:29) (18 - 18)  SpO2: 91% (2019 14:29) (90% - 93%)  Daily     Daily Weight in k (2019 05:52)    PHYSICAL EXAM:    GENERAL: NAD, well-groomed, well-developed  HEAD:  Atraumatic, Normocephalic  EYES: EOMI, PERRLA, conjunctiva and sclera clear  ENMT: No tonsillar erythema, exudates, or enlargement; Moist mucous membranes, Good dentition, No lesions  NECK: Supple, No JVD, Normal thyroid  NERVOUS SYSTEM:  Alert & Oriented X3, Good concentration; Motor Strength 5/5 B/L upper and lower extremities; DTRs 2+ intact and symmetric  CHEST/LUNG: Clear to percussion bilaterally; No rales, rhonchi, wheezing, or rubs  HEART: Regular rate and rhythm; No murmurs, rubs, or gallops  ABDOMEN: Soft, Nontender, Nondistended; Bowel sounds present  EXTREMITIES:  2+ Peripheral Pulses, No clubbing, cyanosis, or edema  SKIN: No rashes or lesions    LABS:                        10.7   5.47  )-----------( 300      ( 2019 07:39 )             31.2     07-18    137  |  105  |  51<H>  ----------------------------<  94  4.6   |  23  |  3.20<H>    Ca    8.1<L>      2019 07:39  Phos  5.3     07-  Mg     2.7     07-17        Urinalysis Basic - ( 2019 19:32 )    Color: Melida / Appearance: Clear / S.015 / pH: x  Gluc: x / Ketone: Negative  / Bili: Moderate / Urobili: 1   Blood: x / Protein: 25 mg/dL / Nitrite: Positive   Leuk Esterase: Negative / RBC: 3-5 /HPF / WBC 0-2   Sq Epi: x / Non Sq Epi: Occasional / Bacteria: Few              RADIOLOGY & ADDITIONAL TESTS:

## 2019-07-19 LAB
ANION GAP SERPL CALC-SCNC: 7 MMOL/L — SIGNIFICANT CHANGE UP (ref 5–17)
BUN SERPL-MCNC: 47 MG/DL — HIGH (ref 7–23)
CALCIUM SERPL-MCNC: 8.3 MG/DL — LOW (ref 8.5–10.1)
CHLORIDE SERPL-SCNC: 108 MMOL/L — SIGNIFICANT CHANGE UP (ref 96–108)
CO2 SERPL-SCNC: 23 MMOL/L — SIGNIFICANT CHANGE UP (ref 22–31)
CREAT SERPL-MCNC: 2.7 MG/DL — HIGH (ref 0.5–1.3)
GLUCOSE SERPL-MCNC: 96 MG/DL — SIGNIFICANT CHANGE UP (ref 70–99)
POTASSIUM SERPL-MCNC: 4.9 MMOL/L — SIGNIFICANT CHANGE UP (ref 3.5–5.3)
POTASSIUM SERPL-SCNC: 4.9 MMOL/L — SIGNIFICANT CHANGE UP (ref 3.5–5.3)
SODIUM SERPL-SCNC: 138 MMOL/L — SIGNIFICANT CHANGE UP (ref 135–145)

## 2019-07-19 PROCEDURE — 99233 SBSQ HOSP IP/OBS HIGH 50: CPT

## 2019-07-19 RX ADMIN — Medication 50 MILLIGRAM(S): at 05:39

## 2019-07-19 RX ADMIN — Medication 100 MILLIGRAM(S): at 17:31

## 2019-07-19 RX ADMIN — FINASTERIDE 5 MILLIGRAM(S): 5 TABLET, FILM COATED ORAL at 11:47

## 2019-07-19 RX ADMIN — Medication 100 MILLIGRAM(S): at 05:39

## 2019-07-19 RX ADMIN — TAMSULOSIN HYDROCHLORIDE 0.4 MILLIGRAM(S): 0.4 CAPSULE ORAL at 22:00

## 2019-07-19 RX ADMIN — SENNA PLUS 2 TABLET(S): 8.6 TABLET ORAL at 22:00

## 2019-07-19 RX ADMIN — ATORVASTATIN CALCIUM 80 MILLIGRAM(S): 80 TABLET, FILM COATED ORAL at 22:00

## 2019-07-19 RX ADMIN — SODIUM CHLORIDE 75 MILLILITER(S): 9 INJECTION INTRAMUSCULAR; INTRAVENOUS; SUBCUTANEOUS at 20:17

## 2019-07-19 RX ADMIN — LIDOCAINE 1 APPLICATION(S): 4 CREAM TOPICAL at 17:39

## 2019-07-19 RX ADMIN — HEPARIN SODIUM 5000 UNIT(S): 5000 INJECTION INTRAVENOUS; SUBCUTANEOUS at 05:39

## 2019-07-19 RX ADMIN — HEPARIN SODIUM 5000 UNIT(S): 5000 INJECTION INTRAVENOUS; SUBCUTANEOUS at 17:31

## 2019-07-19 RX ADMIN — Medication 650 MILLIGRAM(S): at 17:31

## 2019-07-19 RX ADMIN — Medication 650 MILLIGRAM(S): at 18:30

## 2019-07-19 RX ADMIN — Medication 81 MILLIGRAM(S): at 11:47

## 2019-07-19 RX ADMIN — Medication 60 MILLIGRAM(S): at 05:39

## 2019-07-19 NOTE — PROGRESS NOTE ADULT - SUBJECTIVE AND OBJECTIVE BOX
NEPHROLOGY INTERVAL HPI/OVERNIGHT EVENTS:  HPI:  89M w/pmh of CAD s/p CABG (1995), b/l inguinal hernia repairs, enlarged prostate s/p TURP x2, HTN and HLD presenting with urinary frequency. Patient states that for the last 3-4 days he has felt the urge to urinate frequently but when he attempts to go he can only eliminate small dribbles of urine. He went to his outpatient urologist Dr. Cruz and was placed on bactrim for possible prostatitis. Patient had no relief from his symptoms. Per patient he had a bladder u/s in his urologist office which did not show signs of retention. 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 Pt also was note to having some urinary discomfort and was seen by urology, his symptoms resolved and was instructed outpatient follow up with Dr Cruz primary outpatient urologist. Patient denies fever, chills, dysuria, urgency. Denies abdominal pain, nausea, vomiting.     In the ED   VS: WNL  bladder scan performed in ED and wade placed  Labs: Na 127, Cl 95, BUN 60, Cr 4.00, GFR 12. UA 3-5RBC, few bacteria, trace blood, moderate bilirubin, nitrite +.   EKG: NSR, LAD.   CXR: Status post CABG. Small left pleural effusion again seen.  Found to be in urinary retention, wade catheter placed in ED. (16 Jul 2019 21:21)      PAST MEDICAL & SURGICAL HISTORY:  CAD (coronary artery disease): s/p CABG, 1995  Anginal pain  HTN (hypertension)  Enlarged prostate  H/O hemicolectomy  H/O inguinal hernia repair: bilateral  S/P TURP: x2      MEDICATIONS  (STANDING):  aspirin  chewable 81 milliGRAM(s) Oral daily  atorvastatin 80 milliGRAM(s) Oral at bedtime  docusate sodium 100 milliGRAM(s) Oral two times a day  finasteride 5 milliGRAM(s) Oral daily  heparin  Injectable 5000 Unit(s) SubCutaneous every 12 hours  metoprolol succinate ER 50 milliGRAM(s) Oral daily  NIFEdipine XL 60 milliGRAM(s) Oral daily  senna 2 Tablet(s) Oral at bedtime  sodium chloride 0.9%. 1000 milliLiter(s) (75 mL/Hr) IV Continuous <Continuous>  tamsulosin 0.4 milliGRAM(s) Oral at bedtime    MEDICATIONS  (PRN):  acetaminophen   Tablet .. 650 milliGRAM(s) Oral every 6 hours PRN Mild Pain (1 - 3)  lidocaine 2% Gel 1 Application(s) Topical two times a day PRN wade discomfort  polyethylene glycol 3350 17 Gram(s) Oral daily PRN Constipation      Allergies    No Known Allergies    Intolerances        Vital Signs Last 24 Hrs  T(C): 36.5 (19 Jul 2019 13:30), Max: 37.2 (18 Jul 2019 21:16)  T(F): 97.7 (19 Jul 2019 13:30), Max: 98.9 (18 Jul 2019 21:16)  HR: 96 (19 Jul 2019 13:30) (96 - 105)  BP: 102/58 (19 Jul 2019 13:30) (102/58 - 147/86)  BP(mean): --  RR: 18 (19 Jul 2019 13:30) (17 - 18)  SpO2: 93% (19 Jul 2019 13:30) (90% - 93%)  Daily     Daily     PHYSICAL EXAM:    GENERAL: NAD, well-groomed, well-developed  HEAD:  Atraumatic, Normocephalic  EYES: EOMI, conjunctiva and sclera clear  ENMT: No drainage from ears, no drainage from nares  NECK: no obvious masses  NERVOUS SYSTEM:  Alert & Awake, Good concentration  CHEST/LUNG: CATB, No rales, rhonchi, wheezing, or rubs  HEART: Regular rate and rhythm; No murmurs, rubs, or gallops  ABDOMEN: Soft, Nontender, Nondistended; Bowel sounds present  EXTREMITIES: No clubbing, cyanosis, or edema  SKIN: No rashes or lesions    LABS:                        10.7   5.47  )-----------( 300      ( 18 Jul 2019 07:39 )             31.2     07-19    138  |  108  |  47<H>  ----------------------------<  96  4.9   |  23  |  2.70<H>    Ca    8.3<L>      19 Jul 2019 07:59                  RADIOLOGY & ADDITIONAL TESTS:

## 2019-07-19 NOTE — PROGRESS NOTE ADULT - SUBJECTIVE AND OBJECTIVE BOX
CHIEF COMPLAINT/ PRESENT FINDINGS:  feels well  wade draining clear off pyridium         ****************************************************************************************************  PHYSICAL EXAM:    Vital Signs Last 24 Hrs  T(C): 37.1 (19 Jul 2019 05:15), Max: 37.2 (18 Jul 2019 21:16)  T(F): 98.8 (19 Jul 2019 05:15), Max: 98.9 (18 Jul 2019 21:16)  HR: 105 (19 Jul 2019 05:15) (99 - 105)  BP: 147/86 (19 Jul 2019 05:15) (123/72 - 147/86)  BP(mean): --  RR: 17 (19 Jul 2019 05:15) (17 - 18)  SpO2: 92% (19 Jul 2019 05:15) (90% - 92%)    GENERAL: alert     PENIS: had wade  urine clear     TESTES:    PROSTATE/ RECTAL:    ABDOMEN: soft     BACK:    LOWER EXTREMITIES:    NEUROLOGICAL:    **********************************************************************************************************  LABS:                        10.7   5.47  )-----------( 300      ( 18 Jul 2019 07:39 )             31.2     07-19    138  |  108  |  47<H>  ----------------------------<  96  4.9   |  23  |  2.70<H>    Ca    8.3<L>      19 Jul 2019 07:59          Urine Culture: 07-16 @ 22:57  Urine Culure Results   No growth  Organism --      RADIOLOGY & ADDITIONAL STUDIES:    EXAM:  US KIDNEY(S)                            PROCEDURE DATE:  07/18/2019          INTERPRETATION:  History: Acute kidney injury    Bilateral renal ultrasound    Right kidney 10.8 the left 11.6 cm. No hydronephrosis shadowing calculus   solid or cystic renal mass bilaterally.  The urinary bladder is empty, collapsed around a Wade catheter.    Impression: No post renal obstruction.                    LEORA MOORE M.D., ATTENDING RADIOLOGIST  This document has been electronically signed. Jul18 2019  1:53PM                IMPRESSION: jerry   no hydro  improving renal function   only one dose rocephin  with neg urine culture     PLAN: continue with wade   is on flomax and proscar   voiding trial when creat improves to normal recommended

## 2019-07-19 NOTE — PROGRESS NOTE ADULT - PROBLEM SELECTOR PLAN 2
wade placed in ED, draining well  patient follows outpatient with Dr. Anthony wade to remain in place at this time  note pt requesting pyridium as he uses it outpatient and believes it will decrease irritation of wade cath- at present urine is clear yellow.   add lidocaine gel PRN for wade irritation  consulted with urology, recs US, no hydronephrosis

## 2019-07-19 NOTE — PROGRESS NOTE ADULT - SUBJECTIVE AND OBJECTIVE BOX
Patient is a 89y old  Male who presents with a chief complaint of urinary retention jerry (19 Jul 2019 09:39)      INTERVAL HPI/OVERNIGHT EVENTS:89M w/pmh of CAD s/p CABG (1995), b/l inguinal hernia repairs, enlarged prostate s/p TURP x2, HTN and HLD presenting with urinary frequency. Admitted with ARF and urinary retention.     MEDICATIONS  (STANDING):  aspirin  chewable 81 milliGRAM(s) Oral daily  atorvastatin 80 milliGRAM(s) Oral at bedtime  docusate sodium 100 milliGRAM(s) Oral two times a day  finasteride 5 milliGRAM(s) Oral daily  heparin  Injectable 5000 Unit(s) SubCutaneous every 12 hours  metoprolol succinate ER 50 milliGRAM(s) Oral daily  NIFEdipine XL 60 milliGRAM(s) Oral daily  senna 2 Tablet(s) Oral at bedtime  sodium chloride 0.9%. 1000 milliLiter(s) (75 mL/Hr) IV Continuous <Continuous>  tamsulosin 0.4 milliGRAM(s) Oral at bedtime    MEDICATIONS  (PRN):  acetaminophen   Tablet .. 650 milliGRAM(s) Oral every 6 hours PRN Mild Pain (1 - 3)  lidocaine 2% Gel 1 Application(s) Topical two times a day PRN wade discomfort  polyethylene glycol 3350 17 Gram(s) Oral daily PRN Constipation      Allergies    No Known Allergies    Intolerances        REVIEW OF SYSTEMS:  CONSTITUTIONAL: No fever, weight loss, or fatigue  EYES: No eye pain, visual disturbances, or discharge  ENMT:  No difficulty hearing, tinnitus, vertigo; No sinus or throat pain  NECK: No pain or stiffness  BREASTS: No pain, masses, or nipple discharge  RESPIRATORY: No cough, wheezing, chills or hemoptysis; No shortness of breath  CARDIOVASCULAR: No chest pain, palpitations, dizziness, or leg swelling  GASTROINTESTINAL: No abdominal or epigastric pain. No nausea, vomiting, or hematemesis; No diarrhea or constipation. No melena or hematochezia.  GENITOURINARY: No dysuria, frequency, hematuria, or incontinence  NEUROLOGICAL: No headaches, memory loss, loss of strength, numbness, or tremors  SKIN: No itching, burning, rashes, or lesions   LYMPH NODES: No enlarged glands  ENDOCRINE: No heat or cold intolerance; No hair loss; No polydipsia or polyuria  MUSCULOSKELETAL: No joint pain or swelling; No muscle, back, or extremity pain  PSYCHIATRIC: No depression, anxiety, mood swings, or difficulty sleeping  HEME/LYMPH: No easy bruising, or bleeding gums  ALLERGY AND IMMUNOLOGIC: No hives or eczema    Vital Signs Last 24 Hrs  T(C): 37.1 (19 Jul 2019 05:15), Max: 37.2 (18 Jul 2019 21:16)  T(F): 98.8 (19 Jul 2019 05:15), Max: 98.9 (18 Jul 2019 21:16)  HR: 105 (19 Jul 2019 05:15) (99 - 105)  BP: 147/86 (19 Jul 2019 05:15) (123/72 - 147/86)  BP(mean): --  RR: 17 (19 Jul 2019 05:15) (17 - 18)  SpO2: 92% (19 Jul 2019 05:15) (90% - 92%)    PHYSICAL EXAM:  GENERAL: NAD, well-groomed, well-developed  HEAD:  Atraumatic, Normocephalic  EYES: EOMI, PERRLA, conjunctiva and sclera clear  ENMT: No tonsillar erythema, exudates, or enlargement; Moist mucous membranes, Good dentition, No lesions  NECK: Supple, No JVD, Normal thyroid  NERVOUS SYSTEM:  Alert & Oriented X3, Good concentration; Motor Strength 5/5 B/L upper and lower extremities; DTRs 2+ intact and symmetric  CHEST/LUNG: Clear to auscultation bilaterally; No rales, rhonchi, wheezing, or rubs  HEART: Regular rate and rhythm; No murmurs, rubs, or gallops  ABDOMEN: Soft, Nontender, Nondistended; Bowel sounds present  EXTREMITIES:  2+ Peripheral Pulses, No clubbing, cyanosis, or edema  LYMPH: No lymphadenopathy noted  SKIN: No rashes or lesions    LABS:    19 Jul 2019 07:59    138    |  108    |  47     ----------------------------<  96     4.9     |  23     |  2.70     Ca    8.3        19 Jul 2019 07:59          CAPILLARY BLOOD GLUCOSE          RADIOLOGY & ADDITIONAL TESTS:    Imaging Personally Reviewed:  [x ] YES  [ ] NO    Consultant(s) Notes Reviewed:  [x ] YES  [ ] NO    Care Discussed with Consultants/Other Providers [x ] YES  [ ] NO

## 2019-07-19 NOTE — PROGRESS NOTE ADULT - PROBLEM SELECTOR PLAN 1
acute, cr <1 on last admission  admit to [facility]  likely 2/2 urinary retention   wade placed in ed, draining well   continue IVF    avoid nephtrotoxic meds  monitor BMP in AM   nephro Dr. Murillo consulted, recs appreciated  improving

## 2019-07-20 LAB
ANION GAP SERPL CALC-SCNC: 6 MMOL/L — SIGNIFICANT CHANGE UP (ref 5–17)
BUN SERPL-MCNC: 44 MG/DL — HIGH (ref 7–23)
CALCIUM SERPL-MCNC: 8.2 MG/DL — LOW (ref 8.5–10.1)
CHLORIDE SERPL-SCNC: 108 MMOL/L — SIGNIFICANT CHANGE UP (ref 96–108)
CO2 SERPL-SCNC: 24 MMOL/L — SIGNIFICANT CHANGE UP (ref 22–31)
CREAT SERPL-MCNC: 2 MG/DL — HIGH (ref 0.5–1.3)
GLUCOSE SERPL-MCNC: 82 MG/DL — SIGNIFICANT CHANGE UP (ref 70–99)
POTASSIUM SERPL-MCNC: 4.6 MMOL/L — SIGNIFICANT CHANGE UP (ref 3.5–5.3)
POTASSIUM SERPL-SCNC: 4.6 MMOL/L — SIGNIFICANT CHANGE UP (ref 3.5–5.3)
SODIUM SERPL-SCNC: 138 MMOL/L — SIGNIFICANT CHANGE UP (ref 135–145)

## 2019-07-20 PROCEDURE — 99233 SBSQ HOSP IP/OBS HIGH 50: CPT

## 2019-07-20 RX ADMIN — Medication 650 MILLIGRAM(S): at 00:23

## 2019-07-20 RX ADMIN — FINASTERIDE 5 MILLIGRAM(S): 5 TABLET, FILM COATED ORAL at 11:23

## 2019-07-20 RX ADMIN — Medication 650 MILLIGRAM(S): at 17:28

## 2019-07-20 RX ADMIN — ATORVASTATIN CALCIUM 80 MILLIGRAM(S): 80 TABLET, FILM COATED ORAL at 21:27

## 2019-07-20 RX ADMIN — Medication 60 MILLIGRAM(S): at 05:58

## 2019-07-20 RX ADMIN — Medication 650 MILLIGRAM(S): at 18:35

## 2019-07-20 RX ADMIN — TAMSULOSIN HYDROCHLORIDE 0.4 MILLIGRAM(S): 0.4 CAPSULE ORAL at 21:27

## 2019-07-20 RX ADMIN — Medication 650 MILLIGRAM(S): at 00:52

## 2019-07-20 RX ADMIN — HEPARIN SODIUM 5000 UNIT(S): 5000 INJECTION INTRAVENOUS; SUBCUTANEOUS at 17:25

## 2019-07-20 RX ADMIN — Medication 650 MILLIGRAM(S): at 23:52

## 2019-07-20 RX ADMIN — Medication 81 MILLIGRAM(S): at 11:23

## 2019-07-20 RX ADMIN — Medication 650 MILLIGRAM(S): at 01:23

## 2019-07-20 RX ADMIN — Medication 50 MILLIGRAM(S): at 05:58

## 2019-07-20 RX ADMIN — LIDOCAINE 1 APPLICATION(S): 4 CREAM TOPICAL at 00:23

## 2019-07-20 RX ADMIN — SENNA PLUS 2 TABLET(S): 8.6 TABLET ORAL at 21:27

## 2019-07-20 RX ADMIN — HEPARIN SODIUM 5000 UNIT(S): 5000 INJECTION INTRAVENOUS; SUBCUTANEOUS at 05:58

## 2019-07-20 NOTE — PROGRESS NOTE ADULT - SUBJECTIVE AND OBJECTIVE BOX
NEPHROLOGY INTERVAL HPI/OVERNIGHT EVENTS:  HPI:  89M w/pmh of CAD s/p CABG (1995), b/l inguinal hernia repairs, enlarged prostate s/p TURP x2, HTN and HLD presenting with urinary frequency. Patient states that for the last 3-4 days he has felt the urge to urinate frequently but when he attempts to go he can only eliminate small dribbles of urine. He went to his outpatient urologist Dr. Cruz and was placed on bactrim for possible prostatitis. Patient had no relief from his symptoms. Per patient he had a bladder u/s in his urologist office which did not show signs of retention. 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 Pt also was note to having some urinary discomfort and was seen by urology, his symptoms resolved and was instructed outpatient follow up with Dr Cruz primary outpatient urologist. Patient denies fever, chills, dysuria, urgency. Denies abdominal pain, nausea, vomiting.     In the ED   VS: WNL  bladder scan performed in ED and wade placed  Labs: Na 127, Cl 95, BUN 60, Cr 4.00, GFR 12. UA 3-5RBC, few bacteria, trace blood, moderate bilirubin, nitrite +.   EKG: NSR, LAD.   CXR: Status post CABG. Small left pleural effusion again seen.  Found to be in urinary retention, wade catheter placed in ED. (16 Jul 2019 21:21)      PAST MEDICAL & SURGICAL HISTORY:  CAD (coronary artery disease): s/p CABG, 1995  Anginal pain  HTN (hypertension)  Enlarged prostate  H/O hemicolectomy  H/O inguinal hernia repair: bilateral  S/P TURP: x2      MEDICATIONS  (STANDING):  aspirin  chewable 81 milliGRAM(s) Oral daily  atorvastatin 80 milliGRAM(s) Oral at bedtime  docusate sodium 100 milliGRAM(s) Oral two times a day  finasteride 5 milliGRAM(s) Oral daily  heparin  Injectable 5000 Unit(s) SubCutaneous every 12 hours  metoprolol succinate ER 50 milliGRAM(s) Oral daily  NIFEdipine XL 60 milliGRAM(s) Oral daily  senna 2 Tablet(s) Oral at bedtime  sodium chloride 0.9%. 1000 milliLiter(s) (75 mL/Hr) IV Continuous <Continuous>  tamsulosin 0.4 milliGRAM(s) Oral at bedtime    MEDICATIONS  (PRN):  acetaminophen   Tablet .. 650 milliGRAM(s) Oral every 6 hours PRN Mild Pain (1 - 3)  lidocaine 2% Gel 1 Application(s) Topical two times a day PRN wade discomfort  polyethylene glycol 3350 17 Gram(s) Oral daily PRN Constipation      Allergies    No Known Allergies    Intolerances        Vital Signs Last 24 Hrs  T(C): 36.5 (19 Jul 2019 13:30), Max: 37.2 (18 Jul 2019 21:16)  T(F): 97.7 (19 Jul 2019 13:30), Max: 98.9 (18 Jul 2019 21:16)  HR: 96 (19 Jul 2019 13:30) (96 - 105)  BP: 102/58 (19 Jul 2019 13:30) (102/58 - 147/86)  BP(mean): --  RR: 18 (19 Jul 2019 13:30) (17 - 18)  SpO2: 93% (19 Jul 2019 13:30) (90% - 93%)  Daily     Daily     PHYSICAL EXAM:    GENERAL: NAD, well-groomed, well-developed  HEAD:  Atraumatic, Normocephalic  EYES: EOMI, conjunctiva and sclera clear  ENMT: No drainage from ears, no drainage from nares  NECK: no obvious masses  NERVOUS SYSTEM:  Alert & Awake, Good concentration  CHEST/LUNG: CATB, No rales, rhonchi, wheezing, or rubs  HEART: Regular rate and rhythm; No murmurs, rubs, or gallops  ABDOMEN: Soft, Nontender, Nondistended; Bowel sounds present  EXTREMITIES: No clubbing, cyanosis, or edema  SKIN: No rashes or lesions    LABS:  Reviewed

## 2019-07-20 NOTE — PROGRESS NOTE ADULT - SUBJECTIVE AND OBJECTIVE BOX
Patient is a 89y old  Male who presents with a chief complaint of urinary retention jerry (20 Jul 2019 09:15)      INTERVAL HPI/OVERNIGHT EVENTS: 89M w/pmh of CAD s/p CABG (1995), b/l inguinal hernia repairs, enlarged prostate s/p TURP x2, HTN and HLD presenting with urinary frequency. Admitted with ARF and urinary retention.     MEDICATIONS  (STANDING):  aspirin  chewable 81 milliGRAM(s) Oral daily  atorvastatin 80 milliGRAM(s) Oral at bedtime  docusate sodium 100 milliGRAM(s) Oral two times a day  finasteride 5 milliGRAM(s) Oral daily  heparin  Injectable 5000 Unit(s) SubCutaneous every 12 hours  metoprolol succinate ER 50 milliGRAM(s) Oral daily  NIFEdipine XL 60 milliGRAM(s) Oral daily  senna 2 Tablet(s) Oral at bedtime  sodium chloride 0.9%. 1000 milliLiter(s) (75 mL/Hr) IV Continuous <Continuous>  tamsulosin 0.4 milliGRAM(s) Oral at bedtime    MEDICATIONS  (PRN):  acetaminophen   Tablet .. 650 milliGRAM(s) Oral every 6 hours PRN Mild Pain (1 - 3)  lidocaine 2% Gel 1 Application(s) Topical two times a day PRN wade discomfort  polyethylene glycol 3350 17 Gram(s) Oral daily PRN Constipation      Allergies    No Known Allergies    Intolerances        REVIEW OF SYSTEMS:  CONSTITUTIONAL: No fever, weight loss, or fatigue  EYES: No eye pain, visual disturbances, or discharge  ENMT:  No difficulty hearing, tinnitus, vertigo; No sinus or throat pain  NECK: No pain or stiffness  BREASTS: No pain, masses, or nipple discharge  RESPIRATORY: No cough, wheezing, chills or hemoptysis; No shortness of breath  CARDIOVASCULAR: No chest pain, palpitations, dizziness, or leg swelling  GASTROINTESTINAL: No abdominal or epigastric pain. No nausea, vomiting, or hematemesis; No diarrhea or constipation. No melena or hematochezia.  GENITOURINARY: No dysuria, frequency, hematuria, or incontinence  NEUROLOGICAL: No headaches, memory loss, loss of strength, numbness, or tremors  SKIN: No itching, burning, rashes, or lesions   LYMPH NODES: No enlarged glands  ENDOCRINE: No heat or cold intolerance; No hair loss; No polydipsia or polyuria  MUSCULOSKELETAL: No joint pain or swelling; No muscle, back, or extremity pain  PSYCHIATRIC: No depression, anxiety, mood swings, or difficulty sleeping  HEME/LYMPH: No easy bruising, or bleeding gums  ALLERGY AND IMMUNOLOGIC: No hives or eczema    Vital Signs Last 24 Hrs  T(C): 36.6 (20 Jul 2019 05:35), Max: 36.7 (19 Jul 2019 21:12)  T(F): 97.8 (20 Jul 2019 05:35), Max: 98 (19 Jul 2019 21:12)  HR: 99 (20 Jul 2019 05:35) (96 - 100)  BP: 164/90 (20 Jul 2019 05:35) (102/58 - 164/90)  BP(mean): --  RR: 17 (20 Jul 2019 05:35) (17 - 18)  SpO2: 95% (20 Jul 2019 05:35) (93% - 96%)    PHYSICAL EXAM:  GENERAL: NAD, well-groomed, well-developed  HEAD:  Atraumatic, Normocephalic  EYES: EOMI, PERRLA, conjunctiva and sclera clear  ENMT: No tonsillar erythema, exudates, or enlargement; Moist mucous membranes, Good dentition, No lesions  NECK: Supple, No JVD, Normal thyroid  NERVOUS SYSTEM:  Alert & Oriented X3, Good concentration; Motor Strength 5/5 B/L upper and lower extremities; DTRs 2+ intact and symmetric  CHEST/LUNG: Clear to auscultation bilaterally; No rales, rhonchi, wheezing, or rubs  HEART: Regular rate and rhythm; No murmurs, rubs, or gallops  ABDOMEN: Soft, Nontender, Nondistended; Bowel sounds present  EXTREMITIES:  2+ Peripheral Pulses, No clubbing, cyanosis, or edema  LYMPH: No lymphadenopathy noted  SKIN: No rashes or lesions    LABS:    20 Jul 2019 09:23    138    |  108    |  44     ----------------------------<  82     4.6     |  24     |  2.00     Ca    8.2        20 Jul 2019 09:23          CAPILLARY BLOOD GLUCOSE          RADIOLOGY & ADDITIONAL TESTS:    Imaging Personally Reviewed:  [ ] YES  [ ] NO    Consultant(s) Notes Reviewed:  [ ] YES  [ ] NO    Care Discussed with Consultants/Other Providers [ ] YES  [ ] NO

## 2019-07-20 NOTE — PROGRESS NOTE ADULT - PROBLEM SELECTOR PLAN 1
acute, cr <1 on last admission  admit to [facility]  likely 2/2 urinary retention   wade placed in ed, draining well   continue IVF    avoid nephtrotoxic meds  monitor BMP in AM   nephro Dr. Murillo consulted, recs appreciated  improving  no hydronephrosis. resolving.

## 2019-07-20 NOTE — PROGRESS NOTE ADULT - SUBJECTIVE AND OBJECTIVE BOX
afebrile   abdomen  soft  wade in place   last creat 2.7 afebrile   abdomen  soft  wade in place   last creat 2.7   likely to go home with wade and see Dr Cruz in follow up

## 2019-07-20 NOTE — PROGRESS NOTE ADULT - PROBLEM SELECTOR PLAN 2
wade placed in ED, draining well  patient follows outpatient with Dr. Anthony wade to remain in place at this time  note pt requesting pyridium as he uses it outpatient and believes it will decrease irritation of wade cath- at present urine is clear yellow.   add lidocaine gel PRN for wade irritation  consulted with urology, recs US, no hydronephrosis  TOV at am. d/w pt about TOV, he is willing to have voidal  trial at am.

## 2019-07-21 ENCOUNTER — TRANSCRIPTION ENCOUNTER (OUTPATIENT)
Age: 84
End: 2019-07-21

## 2019-07-21 VITALS
OXYGEN SATURATION: 94 % | DIASTOLIC BLOOD PRESSURE: 77 MMHG | HEART RATE: 103 BPM | TEMPERATURE: 98 F | SYSTOLIC BLOOD PRESSURE: 160 MMHG | RESPIRATION RATE: 18 BRPM

## 2019-07-21 LAB
ANION GAP SERPL CALC-SCNC: 9 MMOL/L — SIGNIFICANT CHANGE UP (ref 5–17)
BUN SERPL-MCNC: 35 MG/DL — HIGH (ref 7–23)
CALCIUM SERPL-MCNC: 7.9 MG/DL — LOW (ref 8.5–10.1)
CHLORIDE SERPL-SCNC: 109 MMOL/L — HIGH (ref 96–108)
CO2 SERPL-SCNC: 22 MMOL/L — SIGNIFICANT CHANGE UP (ref 22–31)
CREAT SERPL-MCNC: 1.5 MG/DL — HIGH (ref 0.5–1.3)
CULTURE RESULTS: SIGNIFICANT CHANGE UP
CULTURE RESULTS: SIGNIFICANT CHANGE UP
GLUCOSE SERPL-MCNC: 92 MG/DL — SIGNIFICANT CHANGE UP (ref 70–99)
HCT VFR BLD CALC: 29.6 % — LOW (ref 39–50)
HGB BLD-MCNC: 10.1 G/DL — LOW (ref 13–17)
MAGNESIUM SERPL-MCNC: 1.7 MG/DL — SIGNIFICANT CHANGE UP (ref 1.6–2.6)
MCHC RBC-ENTMCNC: 31 PG — SIGNIFICANT CHANGE UP (ref 27–34)
MCHC RBC-ENTMCNC: 34.1 GM/DL — SIGNIFICANT CHANGE UP (ref 32–36)
MCV RBC AUTO: 90.8 FL — SIGNIFICANT CHANGE UP (ref 80–100)
NRBC # BLD: 0 /100 WBCS — SIGNIFICANT CHANGE UP (ref 0–0)
PHOSPHATE SERPL-MCNC: 2.9 MG/DL — SIGNIFICANT CHANGE UP (ref 2.5–4.5)
PLATELET # BLD AUTO: 355 K/UL — SIGNIFICANT CHANGE UP (ref 150–400)
POTASSIUM SERPL-MCNC: 4.4 MMOL/L — SIGNIFICANT CHANGE UP (ref 3.5–5.3)
POTASSIUM SERPL-SCNC: 4.4 MMOL/L — SIGNIFICANT CHANGE UP (ref 3.5–5.3)
RBC # BLD: 3.26 M/UL — LOW (ref 4.2–5.8)
RBC # FLD: 14.7 % — HIGH (ref 10.3–14.5)
SODIUM SERPL-SCNC: 140 MMOL/L — SIGNIFICANT CHANGE UP (ref 135–145)
SPECIMEN SOURCE: SIGNIFICANT CHANGE UP
SPECIMEN SOURCE: SIGNIFICANT CHANGE UP
WBC # BLD: 5.31 K/UL — SIGNIFICANT CHANGE UP (ref 3.8–10.5)
WBC # FLD AUTO: 5.31 K/UL — SIGNIFICANT CHANGE UP (ref 3.8–10.5)

## 2019-07-21 PROCEDURE — 99285 EMERGENCY DEPT VISIT HI MDM: CPT | Mod: 25

## 2019-07-21 PROCEDURE — 87086 URINE CULTURE/COLONY COUNT: CPT

## 2019-07-21 PROCEDURE — 87040 BLOOD CULTURE FOR BACTERIA: CPT

## 2019-07-21 PROCEDURE — 76775 US EXAM ABDO BACK WALL LIM: CPT

## 2019-07-21 PROCEDURE — 93005 ELECTROCARDIOGRAM TRACING: CPT

## 2019-07-21 PROCEDURE — 84100 ASSAY OF PHOSPHORUS: CPT

## 2019-07-21 PROCEDURE — 71045 X-RAY EXAM CHEST 1 VIEW: CPT

## 2019-07-21 PROCEDURE — 81001 URINALYSIS AUTO W/SCOPE: CPT

## 2019-07-21 PROCEDURE — 85027 COMPLETE CBC AUTOMATED: CPT

## 2019-07-21 PROCEDURE — 36415 COLL VENOUS BLD VENIPUNCTURE: CPT

## 2019-07-21 PROCEDURE — 76857 US EXAM PELVIC LIMITED: CPT

## 2019-07-21 PROCEDURE — 83735 ASSAY OF MAGNESIUM: CPT

## 2019-07-21 PROCEDURE — 80048 BASIC METABOLIC PNL TOTAL CA: CPT

## 2019-07-21 PROCEDURE — 99239 HOSP IP/OBS DSCHRG MGMT >30: CPT

## 2019-07-21 RX ORDER — DOCUSATE SODIUM 100 MG
1 CAPSULE ORAL
Qty: 0 | Refills: 0 | DISCHARGE
Start: 2019-07-21

## 2019-07-21 RX ORDER — SENNA PLUS 8.6 MG/1
2 TABLET ORAL
Qty: 0 | Refills: 0 | DISCHARGE
Start: 2019-07-21

## 2019-07-21 RX ORDER — BENZOCAINE AND MENTHOL 5; 1 G/100ML; G/100ML
1 LIQUID ORAL ONCE
Refills: 0 | Status: COMPLETED | OUTPATIENT
Start: 2019-07-21 | End: 2019-07-21

## 2019-07-21 RX ADMIN — HEPARIN SODIUM 5000 UNIT(S): 5000 INJECTION INTRAVENOUS; SUBCUTANEOUS at 05:51

## 2019-07-21 RX ADMIN — FINASTERIDE 5 MILLIGRAM(S): 5 TABLET, FILM COATED ORAL at 13:19

## 2019-07-21 RX ADMIN — Medication 50 MILLIGRAM(S): at 05:51

## 2019-07-21 RX ADMIN — Medication 60 MILLIGRAM(S): at 05:51

## 2019-07-21 RX ADMIN — BENZOCAINE AND MENTHOL 1 LOZENGE: 5; 1 LIQUID ORAL at 01:53

## 2019-07-21 RX ADMIN — Medication 81 MILLIGRAM(S): at 13:18

## 2019-07-21 NOTE — PROGRESS NOTE ADULT - REASON FOR ADMISSION
urinary retention jerry
urinary retention jrery

## 2019-07-21 NOTE — PROGRESS NOTE ADULT - ASSESSMENT
89M w/pmh of CAD s/p CABG (1995), b/l inguinal hernia repairs, enlarged prostate s/p TURP x2, HTN and HLD presenting with urinary frequency. Admitted with ARF and urinary retention.
Acute Kidney Injury  Hyponatremia  Hypocalcemia  Anemia  HTN    MARIO Likely 2/2 Urinary retention.  Has had TURPx2 in the past.  S/P wade with urine draining.  Creatinine improving.  Voiding trial per urology.   Suspect some mild underlying CKD.  His hyponatremia has resolved. UA nitrate positive with RBCs and protein however, No WBCs.  He denied any dysuria or hematuria but did complain of hesitancy.  No acute indication for dialysis.  - Wade per urology  - DC IVF  - No renal objection for d/c planning. Will need outpatient follow up
Acute Kidney Injury  Hyponatremia  Hypocalcemia  Anemia  HTN    Patient seen and examined.  Sitting up in chair eating lunch.  Doing well. Eager to go home.      Likely 2/2 Urinary retention.  Has had TURPx2 in the past.  S/P wade with urine draining.  Creatinine improving.  Voiding trial over the weekend per urology.   Suspect some mild underlying CKD.  His hyponatremia has resolved. UA nitrate positive with RBCs and protein however, No WBCs.  He denied any dysuria or hematuria but did complain of hesitancy.  No acute indication for dialysis.
Acute Kidney Injury  Hyponatremia  Hypocalcemia  Anemia  HTN    Patient seen and examined.  Sitting up in chair eating lunch.  Doing well. Eager to go home.      Likely 2/2 Urinary retention.  Has had TURPx2 in the past.  S/P wade with urine draining.  Creatinine improving.  Voiding trial over the weekend per urology.   Suspect some mild underlying CKD.  His hyponatremia has resolved. UA nitrate positive with RBCs and protein however, No WBCs.  He denied any dysuria or hematuria but did complain of hesitancy.  No acute indication for dialysis.  - Wade per urology  - No renal objection for d/c planning. Will need outpatient follow up
Acute Kidney Injury  Hyponatremia  Hypocalcemia  Anemia  HTN    Patient seen and examined.  Sitting up in chair eating lunch.  Doing well. Eager to go home.      Likely 2/2 Urinary retention.  Has had TURPx2 in the past.  S/P wade with urine draining.  Creatinine improving   Suspect some mild underlying CKD.  His hyponatremia should resolve with the resolution of the obstruction.  UA nitrate positive with RBCs and protein however, No WBCs.He denied any dysuria or hematuria but did complain of hesitancy.  No acute indication for dialysis.

## 2019-07-21 NOTE — DISCHARGE NOTE NURSING/CASE MANAGEMENT/SOCIAL WORK - NSDCDPATPORTLINK_GEN_ALL_CORE
You can access the [facility] Patient Portal, offered by [facility], by registering with the following website: [email or web address]

## 2019-07-21 NOTE — DISCHARGE NOTE PROVIDER - CARE PROVIDERS DIRECT ADDRESSES
[email or web address]

## 2019-07-21 NOTE — DISCHARGE NOTE PROVIDER - NSDCCPCAREPLAN_GEN_ALL_CORE_FT
PRINCIPAL DISCHARGE DIAGNOSIS  Diagnosis: Acute renal failure, unspecified acute renal failure type  Assessment and Plan of Treatment: 2/2 urinary retention and may some degree of CKD ?? stage 2-3  , f/u with your urologist

## 2019-07-21 NOTE — PROGRESS NOTE ADULT - SUBJECTIVE AND OBJECTIVE BOX
NEPHROLOGY INTERVAL HPI/OVERNIGHT EVENTS:  HPI:  89M w/pmh of CAD s/p CABG (1995), b/l inguinal hernia repairs, enlarged prostate s/p TURP x2, HTN and HLD presenting with urinary frequency. Patient states that for the last 3-4 days he has felt the urge to urinate frequently but when he attempts to go he can only eliminate small dribbles of urine. He went to his outpatient urologist Dr. Cruz and was placed on bactrim for possible prostatitis. Patient had no relief from his symptoms. Per patient he had a bladder u/s in his urologist office which did not show signs of retention. 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 Pt also was note to having some urinary discomfort and was seen by urology, his symptoms resolved and was instructed outpatient follow up with Dr Cruz primary outpatient urologist. Patient denies fever, chills, dysuria, urgency. Denies abdominal pain, nausea, vomiting.     In the ED   VS: WNL  bladder scan performed in ED and wade placed  Labs: Na 127, Cl 95, BUN 60, Cr 4.00, GFR 12. UA 3-5RBC, few bacteria, trace blood, moderate bilirubin, nitrite +.   EKG: NSR, LAD.   CXR: Status post CABG. Small left pleural effusion again seen.  Found to be in urinary retention, wade catheter placed in ED. (16 Jul 2019 21:21)      PAST MEDICAL & SURGICAL HISTORY:  CAD (coronary artery disease): s/p CABG, 1995  Anginal pain  HTN (hypertension)  Enlarged prostate  H/O hemicolectomy  H/O inguinal hernia repair: bilateral  S/P TURP: x2      MEDICATIONS  (STANDING):  aspirin  chewable 81 milliGRAM(s) Oral daily  atorvastatin 80 milliGRAM(s) Oral at bedtime  docusate sodium 100 milliGRAM(s) Oral two times a day  finasteride 5 milliGRAM(s) Oral daily  heparin  Injectable 5000 Unit(s) SubCutaneous every 12 hours  metoprolol succinate ER 50 milliGRAM(s) Oral daily  NIFEdipine XL 60 milliGRAM(s) Oral daily  senna 2 Tablet(s) Oral at bedtime  sodium chloride 0.9%. 1000 milliLiter(s) (75 mL/Hr) IV Continuous <Continuous>  tamsulosin 0.4 milliGRAM(s) Oral at bedtime    MEDICATIONS  (PRN):  acetaminophen   Tablet .. 650 milliGRAM(s) Oral every 6 hours PRN Mild Pain (1 - 3)  lidocaine 2% Gel 1 Application(s) Topical two times a day PRN wade discomfort  polyethylene glycol 3350 17 Gram(s) Oral daily PRN Constipation      Allergies    No Known Allergies    Intolerances        Vital Signs Last 24 Hrs  T(C): 36.5 (19 Jul 2019 13:30), Max: 37.2 (18 Jul 2019 21:16)  T(F): 97.7 (19 Jul 2019 13:30), Max: 98.9 (18 Jul 2019 21:16)  HR: 96 (19 Jul 2019 13:30) (96 - 105)  BP: 102/58 (19 Jul 2019 13:30) (102/58 - 147/86)  BP(mean): --  RR: 18 (19 Jul 2019 13:30) (17 - 18)  SpO2: 93% (19 Jul 2019 13:30) (90% - 93%)  Daily     Daily     PHYSICAL EXAM:    GENERAL: NAD, well-groomed, well-developed  HEAD:  Atraumatic, Normocephalic  EYES: EOMI, conjunctiva and sclera clear  ENMT: No drainage from ears, no drainage from nares  NECK: no obvious masses  NERVOUS SYSTEM:  Alert & Awake, Good concentration  CHEST/LUNG: CATB, No rales, rhonchi, wheezing, or rubs  HEART: Regular rate and rhythm; No murmurs, rubs, or gallops  ABDOMEN: Soft, Nontender, Nondistended; Bowel sounds present  EXTREMITIES: No clubbing, cyanosis, or edema  SKIN: No rashes or lesions    LABS:  Reviewed

## 2019-07-21 NOTE — DISCHARGE NOTE PROVIDER - HOSPITAL COURSE
89M w/pmh of CAD s/p CABG (1995), b/l inguinal hernia repairs, enlarged prostate s/p TURP x2, HTN and HLD presenting with urinary frequency. Patient states that for the last 3-4 days he has felt the urge to urinate frequently but when he attempts to go he can only eliminate small dribbles of urine. He went to his outpatient urologist Dr. Cruz and was placed on bactrim for possible prostatitis. Patient had no relief from his symptoms. Per patient he had a bladder u/s in his urologist office which did not show signs of retention. 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 Pt also was note to having some urinary discomfort and was seen by urology, his symptoms resolved and was instructed outpatient follow up with Dr Cruz primary outpatient urologist. Patient denies fever, chills, dysuria, urgency. Denies abdominal pain, nausea, vomiting.         In the ED     VS: WNL    bladder scan performed in ED and wade placed    Labs: Na 127, Cl 95, BUN 60, Cr 4.00, GFR 12. UA 3-5RBC, few bacteria, trace blood, moderate bilirubin, nitrite +.     EKG: NSR, LAD.     CXR: Status post CABG. Small left pleural effusion again seen.    Found to be in urinary retention, wade catheter placed in ED.  Sepsis on admission ruled out.          Acute renal failure: acute, cr <1 on last admission    likely 2/2 urinary retention , wade placed in ed, draining well , treated with IVF  , improved  to Creatinine, Serum: 1.50 mg/dL (07.21.19 @ 08:33)    nephro Dr. Murillo consulted, recs appreciated    no hydronephrosis on US.         Urinary retention: wade placed in ED, draining well    patient follows outpatient with Dr. Cruz    consulted with urology, recs US, no hydronephrosis    TOV today, d/w pt about TOV, he is willing to have voidal  trial today.         Hyponatremia: resolved , Pt is asymptomatic, not confused, treated with  normal saline IVF . Sodium, Serum: 140 mmol/L (07.21.19 @ 08:33)         Enlarged prostate : chronic continue home dose proscar and flomax.         I spent 46 min and if TOV successful , pt can be discharged this afternoon otherwise if PVR > 350 cc,  will reinsert wade and then discharge home today.     Pcp notified.         PHYSICAL EXAM        Constitutional: NAD, well-groomed, well-developed    HEENT: PERRLA, EOMI, Normal Hearing, MMM    Neck: No LAD, No JVD    Back: Normal spine flexure, No CVA tenderness    Respiratory: CTAB/L     Cardiovascular: S1 and S2, RRR, no M/G/R    Gastrointestinal: BS+, soft, NT/ND    Extremities: No peripheral edema    Vascular: 2+ peripheral pulses    Neurological: A/O x 3, no focal deficits    Skin: No rashes

## 2019-07-21 NOTE — DISCHARGE NOTE PROVIDER - CARE PROVIDER_API CALL
Martinez Cruz (MD)  Urology  [address] [facility], [facility] [address] [place] [address]  Phone: [phone]  Fax: [phone]  Follow Up Time:     Steve Esparza)  Cardiovascular Disease; Internal Medicine  [address] [place], [place] [id number]  Phone: [phone]  Fax: [phone]  Follow Up Time:

## 2019-08-02 ENCOUNTER — TRANSCRIPTION ENCOUNTER (OUTPATIENT)
Age: 84
End: 2019-08-02

## 2019-08-03 ENCOUNTER — INPATIENT (INPATIENT)
Facility: HOSPITAL | Age: 84
LOS: 6 days | DRG: 329 | End: 2019-08-10
Attending: INTERNAL MEDICINE | Admitting: STUDENT IN AN ORGANIZED HEALTH CARE EDUCATION/TRAINING PROGRAM
Payer: MEDICARE

## 2019-08-03 ENCOUNTER — RESULT REVIEW (OUTPATIENT)
Age: 84
End: 2019-08-03

## 2019-08-03 VITALS
SYSTOLIC BLOOD PRESSURE: 186 MMHG | DIASTOLIC BLOOD PRESSURE: 81 MMHG | HEART RATE: 74 BPM | TEMPERATURE: 97 F | RESPIRATION RATE: 16 BRPM | HEIGHT: 67 IN | OXYGEN SATURATION: 98 % | WEIGHT: 139.99 LBS

## 2019-08-03 DIAGNOSIS — K56.609 UNSPECIFIED INTESTINAL OBSTRUCTION, UNSPECIFIED AS TO PARTIAL VERSUS COMPLETE OBSTRUCTION: ICD-10-CM

## 2019-08-03 DIAGNOSIS — N40.0 BENIGN PROSTATIC HYPERPLASIA WITHOUT LOWER URINARY TRACT SYMPTOMS: ICD-10-CM

## 2019-08-03 DIAGNOSIS — I25.10 ATHEROSCLEROTIC HEART DISEASE OF NATIVE CORONARY ARTERY WITHOUT ANGINA PECTORIS: ICD-10-CM

## 2019-08-03 DIAGNOSIS — Z90.49 ACQUIRED ABSENCE OF OTHER SPECIFIED PARTS OF DIGESTIVE TRACT: Chronic | ICD-10-CM

## 2019-08-03 DIAGNOSIS — Z98.890 OTHER SPECIFIED POSTPROCEDURAL STATES: Chronic | ICD-10-CM

## 2019-08-03 DIAGNOSIS — Z29.9 ENCOUNTER FOR PROPHYLACTIC MEASURES, UNSPECIFIED: ICD-10-CM

## 2019-08-03 DIAGNOSIS — Z90.79 ACQUIRED ABSENCE OF OTHER GENITAL ORGAN(S): Chronic | ICD-10-CM

## 2019-08-03 DIAGNOSIS — I10 ESSENTIAL (PRIMARY) HYPERTENSION: ICD-10-CM

## 2019-08-03 LAB
ALBUMIN SERPL ELPH-MCNC: 4 G/DL — SIGNIFICANT CHANGE UP (ref 3.3–5)
ALP SERPL-CCNC: 77 U/L — SIGNIFICANT CHANGE UP (ref 40–120)
ALT FLD-CCNC: 36 U/L — SIGNIFICANT CHANGE UP (ref 12–78)
ANION GAP SERPL CALC-SCNC: 8 MMOL/L — SIGNIFICANT CHANGE UP (ref 5–17)
APPEARANCE UR: CLEAR — SIGNIFICANT CHANGE UP
APTT BLD: 26.7 SEC — LOW (ref 28.5–37)
AST SERPL-CCNC: 17 U/L — SIGNIFICANT CHANGE UP (ref 15–37)
BASOPHILS # BLD AUTO: 0.03 K/UL — SIGNIFICANT CHANGE UP (ref 0–0.2)
BASOPHILS NFR BLD AUTO: 0.5 % — SIGNIFICANT CHANGE UP (ref 0–2)
BILIRUB SERPL-MCNC: 0.4 MG/DL — SIGNIFICANT CHANGE UP (ref 0.2–1.2)
BILIRUB UR-MCNC: NEGATIVE — SIGNIFICANT CHANGE UP
BUN SERPL-MCNC: 45 MG/DL — HIGH (ref 7–23)
CALCIUM SERPL-MCNC: 8.8 MG/DL — SIGNIFICANT CHANGE UP (ref 8.5–10.1)
CHLORIDE SERPL-SCNC: 102 MMOL/L — SIGNIFICANT CHANGE UP (ref 96–108)
CO2 SERPL-SCNC: 24 MMOL/L — SIGNIFICANT CHANGE UP (ref 22–31)
COLOR SPEC: YELLOW — SIGNIFICANT CHANGE UP
CREAT SERPL-MCNC: 1.3 MG/DL — SIGNIFICANT CHANGE UP (ref 0.5–1.3)
DIFF PNL FLD: ABNORMAL
EOSINOPHIL # BLD AUTO: 0.04 K/UL — SIGNIFICANT CHANGE UP (ref 0–0.5)
EOSINOPHIL NFR BLD AUTO: 0.6 % — SIGNIFICANT CHANGE UP (ref 0–6)
GLUCOSE SERPL-MCNC: 147 MG/DL — HIGH (ref 70–99)
GLUCOSE UR QL: NEGATIVE — SIGNIFICANT CHANGE UP
HCT VFR BLD CALC: 32 % — LOW (ref 39–50)
HGB BLD-MCNC: 11.1 G/DL — LOW (ref 13–17)
IMM GRANULOCYTES NFR BLD AUTO: 0.3 % — SIGNIFICANT CHANGE UP (ref 0–1.5)
INR BLD: 1.02 RATIO — SIGNIFICANT CHANGE UP (ref 0.88–1.16)
KETONES UR-MCNC: NEGATIVE — SIGNIFICANT CHANGE UP
LACTATE SERPL-SCNC: 1.2 MMOL/L — SIGNIFICANT CHANGE UP (ref 0.7–2)
LACTATE SERPL-SCNC: 2.2 MMOL/L — HIGH (ref 0.7–2)
LEUKOCYTE ESTERASE UR-ACNC: NEGATIVE — SIGNIFICANT CHANGE UP
LYMPHOCYTES # BLD AUTO: 0.83 K/UL — LOW (ref 1–3.3)
LYMPHOCYTES # BLD AUTO: 12.6 % — LOW (ref 13–44)
MCHC RBC-ENTMCNC: 32.7 PG — SIGNIFICANT CHANGE UP (ref 27–34)
MCHC RBC-ENTMCNC: 34.7 GM/DL — SIGNIFICANT CHANGE UP (ref 32–36)
MCV RBC AUTO: 94.4 FL — SIGNIFICANT CHANGE UP (ref 80–100)
MONOCYTES # BLD AUTO: 0.47 K/UL — SIGNIFICANT CHANGE UP (ref 0–0.9)
MONOCYTES NFR BLD AUTO: 7.1 % — SIGNIFICANT CHANGE UP (ref 2–14)
NEUTROPHILS # BLD AUTO: 5.19 K/UL — SIGNIFICANT CHANGE UP (ref 1.8–7.4)
NEUTROPHILS NFR BLD AUTO: 78.9 % — HIGH (ref 43–77)
NITRITE UR-MCNC: NEGATIVE — SIGNIFICANT CHANGE UP
NRBC # BLD: 0 /100 WBCS — SIGNIFICANT CHANGE UP (ref 0–0)
PH UR: 5 — SIGNIFICANT CHANGE UP (ref 5–8)
PLATELET # BLD AUTO: 256 K/UL — SIGNIFICANT CHANGE UP (ref 150–400)
POTASSIUM SERPL-MCNC: 4.8 MMOL/L — SIGNIFICANT CHANGE UP (ref 3.5–5.3)
POTASSIUM SERPL-SCNC: 4.8 MMOL/L — SIGNIFICANT CHANGE UP (ref 3.5–5.3)
PROT SERPL-MCNC: 7.8 G/DL — SIGNIFICANT CHANGE UP (ref 6–8.3)
PROT UR-MCNC: 25 MG/DL
PROTHROM AB SERPL-ACNC: 11.6 SEC — SIGNIFICANT CHANGE UP (ref 10–12.9)
RBC # BLD: 3.39 M/UL — LOW (ref 4.2–5.8)
RBC # FLD: 15.8 % — HIGH (ref 10.3–14.5)
SODIUM SERPL-SCNC: 134 MMOL/L — LOW (ref 135–145)
SP GR SPEC: 1.01 — SIGNIFICANT CHANGE UP (ref 1.01–1.02)
UROBILINOGEN FLD QL: NEGATIVE — SIGNIFICANT CHANGE UP
WBC # BLD: 6.58 K/UL — SIGNIFICANT CHANGE UP (ref 3.8–10.5)
WBC # FLD AUTO: 6.58 K/UL — SIGNIFICANT CHANGE UP (ref 3.8–10.5)

## 2019-08-03 PROCEDURE — 88307 TISSUE EXAM BY PATHOLOGIST: CPT | Mod: 26

## 2019-08-03 PROCEDURE — 93010 ELECTROCARDIOGRAM REPORT: CPT

## 2019-08-03 PROCEDURE — 99223 1ST HOSP IP/OBS HIGH 75: CPT | Mod: GC,AI

## 2019-08-03 PROCEDURE — 74176 CT ABD & PELVIS W/O CONTRAST: CPT | Mod: 26

## 2019-08-03 PROCEDURE — 99285 EMERGENCY DEPT VISIT HI MDM: CPT

## 2019-08-03 PROCEDURE — 71045 X-RAY EXAM CHEST 1 VIEW: CPT | Mod: 26,76

## 2019-08-03 PROCEDURE — 99223 1ST HOSP IP/OBS HIGH 75: CPT | Mod: 57

## 2019-08-03 PROCEDURE — 44120 REMOVAL OF SMALL INTESTINE: CPT | Mod: 22

## 2019-08-03 PROCEDURE — 44120 REMOVAL OF SMALL INTESTINE: CPT | Mod: AS

## 2019-08-03 RX ORDER — HYDRALAZINE HCL 50 MG
10 TABLET ORAL ONCE
Refills: 0 | Status: COMPLETED | OUTPATIENT
Start: 2019-08-03 | End: 2019-08-03

## 2019-08-03 RX ORDER — DOCUSATE SODIUM 100 MG
100 CAPSULE ORAL
Refills: 0 | Status: DISCONTINUED | OUTPATIENT
Start: 2019-08-03 | End: 2019-08-03

## 2019-08-03 RX ORDER — ATORVASTATIN CALCIUM 80 MG/1
80 TABLET, FILM COATED ORAL AT BEDTIME
Refills: 0 | Status: DISCONTINUED | OUTPATIENT
Start: 2019-08-03 | End: 2019-08-03

## 2019-08-03 RX ORDER — LABETALOL HCL 100 MG
10 TABLET ORAL ONCE
Refills: 0 | Status: COMPLETED | OUTPATIENT
Start: 2019-08-03 | End: 2019-08-03

## 2019-08-03 RX ORDER — NIFEDIPINE 30 MG
60 TABLET, EXTENDED RELEASE 24 HR ORAL DAILY
Refills: 0 | Status: CANCELLED | OUTPATIENT
Start: 2019-08-04 | End: 2019-08-03

## 2019-08-03 RX ORDER — MORPHINE SULFATE 50 MG/1
4 CAPSULE, EXTENDED RELEASE ORAL ONCE
Refills: 0 | Status: DISCONTINUED | OUTPATIENT
Start: 2019-08-03 | End: 2019-08-03

## 2019-08-03 RX ORDER — ONDANSETRON 8 MG/1
4 TABLET, FILM COATED ORAL EVERY 6 HOURS
Refills: 0 | Status: DISCONTINUED | OUTPATIENT
Start: 2019-08-03 | End: 2019-08-03

## 2019-08-03 RX ORDER — METOPROLOL TARTRATE 50 MG
50 TABLET ORAL DAILY
Refills: 0 | Status: DISCONTINUED | OUTPATIENT
Start: 2019-08-03 | End: 2019-08-03

## 2019-08-03 RX ORDER — SODIUM CHLORIDE 9 MG/ML
1950 INJECTION INTRAMUSCULAR; INTRAVENOUS; SUBCUTANEOUS ONCE
Refills: 0 | Status: COMPLETED | OUTPATIENT
Start: 2019-08-03 | End: 2019-08-03

## 2019-08-03 RX ORDER — ONDANSETRON 8 MG/1
4 TABLET, FILM COATED ORAL ONCE
Refills: 0 | Status: COMPLETED | OUTPATIENT
Start: 2019-08-03 | End: 2019-08-03

## 2019-08-03 RX ORDER — ACETAMINOPHEN 500 MG
650 TABLET ORAL EVERY 6 HOURS
Refills: 0 | Status: DISCONTINUED | OUTPATIENT
Start: 2019-08-03 | End: 2019-08-03

## 2019-08-03 RX ORDER — FINASTERIDE 5 MG/1
5 TABLET, FILM COATED ORAL DAILY
Refills: 0 | Status: DISCONTINUED | OUTPATIENT
Start: 2019-08-03 | End: 2019-08-03

## 2019-08-03 RX ORDER — CEFAZOLIN SODIUM 1 G
2000 VIAL (EA) INJECTION ONCE
Refills: 0 | Status: DISCONTINUED | OUTPATIENT
Start: 2019-08-03 | End: 2019-08-03

## 2019-08-03 RX ORDER — METOPROLOL TARTRATE 50 MG
2.5 TABLET ORAL EVERY 6 HOURS
Refills: 0 | Status: CANCELLED | OUTPATIENT
Start: 2019-08-04 | End: 2019-08-03

## 2019-08-03 RX ORDER — HYDROMORPHONE HYDROCHLORIDE 2 MG/ML
0.5 INJECTION INTRAMUSCULAR; INTRAVENOUS; SUBCUTANEOUS EVERY 4 HOURS
Refills: 0 | Status: DISCONTINUED | OUTPATIENT
Start: 2019-08-03 | End: 2019-08-03

## 2019-08-03 RX ORDER — MORPHINE SULFATE 50 MG/1
2 CAPSULE, EXTENDED RELEASE ORAL EVERY 4 HOURS
Refills: 0 | Status: DISCONTINUED | OUTPATIENT
Start: 2019-08-03 | End: 2019-08-03

## 2019-08-03 RX ORDER — NIFEDIPINE 30 MG
60 TABLET, EXTENDED RELEASE 24 HR ORAL DAILY
Refills: 0 | Status: DISCONTINUED | OUTPATIENT
Start: 2019-08-03 | End: 2019-08-03

## 2019-08-03 RX ORDER — TAMSULOSIN HYDROCHLORIDE 0.4 MG/1
0.4 CAPSULE ORAL AT BEDTIME
Refills: 0 | Status: DISCONTINUED | OUTPATIENT
Start: 2019-08-03 | End: 2019-08-03

## 2019-08-03 RX ADMIN — SODIUM CHLORIDE 1950 MILLILITER(S): 9 INJECTION INTRAMUSCULAR; INTRAVENOUS; SUBCUTANEOUS at 18:30

## 2019-08-03 RX ADMIN — HYDROMORPHONE HYDROCHLORIDE 0.5 MILLIGRAM(S): 2 INJECTION INTRAMUSCULAR; INTRAVENOUS; SUBCUTANEOUS at 21:36

## 2019-08-03 RX ADMIN — SODIUM CHLORIDE 1950 MILLILITER(S): 9 INJECTION INTRAMUSCULAR; INTRAVENOUS; SUBCUTANEOUS at 17:30

## 2019-08-03 RX ADMIN — MORPHINE SULFATE 4 MILLIGRAM(S): 50 CAPSULE, EXTENDED RELEASE ORAL at 21:12

## 2019-08-03 RX ADMIN — Medication 10 MILLIGRAM(S): at 21:35

## 2019-08-03 RX ADMIN — ONDANSETRON 4 MILLIGRAM(S): 8 TABLET, FILM COATED ORAL at 17:32

## 2019-08-03 RX ADMIN — Medication 10 MILLIGRAM(S): at 21:07

## 2019-08-03 NOTE — ED ADULT NURSE NOTE - NSIMPLEMENTINTERV_GEN_ALL_ED
Implemented All Universal Safety Interventions:  [place] to call system. Call bell, personal items and telephone within reach. Instruct patient to call for assistance. Room bathroom lighting operational. Non-slip footwear when patient is off stretcher. Physically safe environment: no spills, clutter or unnecessary equipment. Stretcher in lowest position, wheels locked, appropriate side rails in place.

## 2019-08-03 NOTE — ED ADULT NURSE NOTE - CHPI ED NUR SYMPTOMS NEG
no fever/no abdominal distension/no vomiting/no dysuria/no hematuria/no diarrhea/no blood in stool/no nausea/no burning urination/no chills

## 2019-08-03 NOTE — H&P ADULT - PROBLEM SELECTOR PLAN 4
- Continue atorvastatin 80 mg at bedtime - Continue atorvastatin 80 mg at bedtime  - Holding asa 81, can restart after surgery - Continue atorvastatin 80 mg at bedtime  - Holding asa 81, to restart after surgery

## 2019-08-03 NOTE — ED ADULT NURSE REASSESSMENT NOTE - NS ED NURSE REASSESS COMMENT FT1
received hand off report from Quita BURTON. NGT tube inserted on the right nares, connected to low suction 100ml yellow colored output noted. awaiting CXR to confirm placement.

## 2019-08-03 NOTE — H&P ADULT - PROBLEM SELECTOR PLAN 2
- Continue home meds metoprolol succinate 50 mg daily, nifedipine 60 mg daily with hold parameters.  - Given labetolol 10 mg IV for SBP >200 Chronic, elevated in the BP  - Given labetolol 10 mg IV x1 for SBP >200 on admission  - Will hold home metoprolol succinate 50 mg daily and nifedipine 60 mg daily (which he took this morning) for now while NPO  - Starting IV metoprolol tartrate 2.5 q6h with hold parameters Chronic, elevated in the ED  - Given labetolol 10 mg IV x1 and hydralazine 10 mg x1 for SBP >200 on admission  - Will hold home metoprolol succinate 50 mg daily and nifedipine 60 mg daily (which he took this morning) for now while NPO  - Starting IV metoprolol tartrate 2.5 q6h with hold parameters Chronic, elevated in the ED, likely multifactorial- pt with significant pain and urinary retention.   - Given labetolol 10 mg IV x1 and hydralazine 10 mg x1 for SBP >200 on admission  - wade placed   - given 4mg morphine x 1 and .5mg dilaudid x 1 for pain control  - Will hold home metoprolol succinate 50 mg daily and nifedipine 60 mg daily (which he took this morning) for now while NPO  - Starting IV metoprolol tartrate 2.5 q6h with hold parameters will increase prn

## 2019-08-03 NOTE — H&P ADULT - NSHPSOCIALHISTORY_GEN_ALL_CORE
Mr. Arce lives with his wife. He ambulates without assistance at baseline. He has never smoked, and has one alcoholic drink per day. No illicit drug use.

## 2019-08-03 NOTE — ED PROVIDER NOTE - CHPI ED SYMPTOMS NEG
no burning urination/no abdominal distension/no fever/no diarrhea/no dysuria/no hematuria/no chills/no blood in stool

## 2019-08-03 NOTE — H&P ADULT - HISTORY OF PRESENT ILLNESS
89 year old male with a past medical history of abdominal surgery (hemicolectomy, June 2019, bilateral inguinal hernia repair), CAD s/p CABG (>20 years ago), BPH, HTN and recent hospitalization for SBO requiring surgical intervention, who presents with abdominal pain, nausea, vomiting. He reports that he suddenly developed sharp, severe pain in his abdomen around 1:00PM this afternoon. The pain is localized to his RUQ and epigastric/umbilical region. He denies radiation of pain, aggravating/alleviating factors. He has been nauseous with a minimal amount of vomiting. He has not passed gas or had a BM He denies fevers, chills, recent weight loss, chest pain, shortness of breath, hematochezia, melena, dysuria.    ED Cours: NG tube placed with 100 cc green fluid decompressed from stomach. 89 year old male with a past medical history of abdominal surgery (right hemicolectomy, June 2019, bilateral inguinal hernia repair), CAD s/p CABG (>20 years ago), BPH, HTN and recent hospitalization for SBO requiring surgical intervention, who presents with abdominal pain, nausea, vomiting. He reports that he gradually developed sharp, severe pain in his abdomen around 1:00PM this afternoon which remains unchanged. The pain is localized to his RUQ and epigastric/umbilical region. He denies radiation of pain, aggravating/alleviating factors. He has been nauseous with a minimal amount of vomiting. He has not passed gas or had a BM since the onset of symptoms. He denies a family history of GI cancer. He denies fevers, chills, recent weight loss, chest pain, shortness of breath, hematochezia, melena, dysuria, HA, blurry vision.    ED Course: CT abdomen and pelvis revealed small bowel obstruction with multiple transition points in the central abdomen as well as mesenteric edema and ischemia in the right hemiabdomen. Given NS bolus and odansetron for nausea, and NG tube placed with 100 cc of green fluid decompressed from stomach. Labs notable for WBC count 6.58, Na 134, lipase 171, amylase 83. UA negative. EKG nsr. Hypertensive to 209/95, given clonidine patch. 89 year old male with a past medical history of abdominal surgery (right hemicolectomy, June 2019, bilateral inguinal hernia repair), CAD s/p CABG (>20 years ago), BPH, HTN and recent hospitalization for SBO requiring surgical intervention, who presents with abdominal pain, nausea, vomiting. He reports that he gradually developed sharp, severe pain in his abdomen around 1:00PM this afternoon which remains unchanged. The pain is localized to his RUQ and epigastric/umbilical region. He denies radiation of pain, aggravating/alleviating factors. He has been nauseous with a minimal amount of vomiting. He has not passed gas or had a BM since the onset of symptoms. He denies a family history of GI cancer. He denies fevers, chills, recent weight loss, chest pain, shortness of breath, hematochezia, melena, dysuria, HA, blurry vision.    ED Course: CT abdomen and pelvis revealed small bowel obstruction with multiple transition points in the central abdomen as well as mesenteric edema and ischemia in the right hemiabdomen. Given NS bolus and odansetron for nausea, and NG tube placed with 100 cc of green fluid decompressed from stomach. Labs notable for WBC count 6.58, lactate 1.2, Na 134, lipase 171, amylase 83. UA negative. EKG nsr. Hypertensive to 209/95, given labetalol. 89 year old male with a past medical history of abdominal surgery (right hemicolectomy, June 2019, bilateral inguinal hernia repair), CAD s/p CABG (>20 years ago), BPH, HTN and recent hospitalization for SBO requiring surgical intervention, who presents with abdominal pain, nausea, vomiting. He reports that he gradually developed sharp, severe pain in his abdomen around 1:00PM this afternoon which remains unchanged. The pain is localized to his RUQ and epigastric/umbilical region. He denies radiation of pain, aggravating/alleviating factors. He has been nauseous with a minimal amount of vomiting. He has not passed gas or had a BM since the onset of symptoms. He denies a family history of GI cancer. He denies fevers, chills, recent weight loss, chest pain, shortness of breath, hematochezia, melena, dysuria, HA, blurry vision.    ED Course: Vital Signs: T(F): 97.8, HR: 70 - 85, BP: 184/94 - 209/95, RR: 16, SpO2: 96% - 98%. CT abdomen and pelvis revealed small bowel obstruction with multiple transition points in the central abdomen as well as mesenteric edema and ischemia in the right hemiabdomen. Given NS bolus and odansetron for nausea, and NG tube placed with 100 cc of green fluid decompressed from stomach. Labs notable for WBC count 6.58, lactate 1.2, Na 134, lipase 171, amylase 83. UA negative. EKG nsr. Hypertensive to 209/95, given labetalol. 89 year old male with a past medical history of abdominal surgery (right hemicolectomy, June 2019, bilateral inguinal hernia repair), CAD s/p CABG (>20 years ago), BPH, HTN and recent hospitalization for 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 He reports that he gradually developed sharp, severe pain in his abdomen around 1:00PM this afternoon which remains unchanged. The pain is localized to his RUQ and epigastric/umbilical region. He denies radiation of pain, aggravating/alleviating factors. He has been nauseous with a minimal amount of vomiting. He has not passed gas or had a BM since the onset of symptoms. He denies a family history of GI cancer. He denies fevers, chills, recent weight loss, chest pain, shortness of breath, hematochezia, melena, dysuria, HA, blurry vision.    ED Course: Vital Signs: T(F): 97.8, HR: 70 - 85, BP: 184/94 - 209/95, RR: 16, SpO2: 96% - 98%. CT abdomen and pelvis revealed small bowel obstruction with multiple transition points in the central abdomen as well as mesenteric edema and ischemia in the right hemiabdomen. Given NS bolus and odansetron for nausea, and NG tube placed with 100 cc of green fluid decompressed from stomach. Labs notable for WBC count 6.58, lactate 1.2, Na 134, lipase 171, amylase 83. UA negative. EKG nsr. Hypertensive to 209/95, given labetalol and hydralazine.

## 2019-08-03 NOTE — CONSULT NOTE ADULT - SUBJECTIVE AND OBJECTIVE BOX
HPI:  89 year old male with a past medical history of abdominal surgery (right hemicolectomy, 2019, bilateral inguinal hernia repair), CAD s/p CABG (>20 years ago), BPH, HTN and recent hospitalization for SBO requiring surgical intervention, who presents with abdominal pain, nausea, vomiting. He reports that he gradually developed sharp, severe pain in his abdomen around 1:00PM this afternoon which remains unchanged. The pain is localized to his RUQ and epigastric/umbilical region. He denies radiation of pain, aggravating/alleviating factors. He has been nauseous with a minimal amount of vomiting. He has not passed gas or had a BM since the onset of symptoms. He denies a family history of GI cancer. He denies fevers, chills, recent weight loss, chest pain, shortness of breath, hematochezia, melena, dysuria, HA, blurry vision.    ED Course: Vital Signs: T(F): 97.8, HR: 70 - 85, BP: 184/94 - 209/95, RR: 16, SpO2: 96% - 98%. CT abdomen and pelvis revealed small bowel obstruction with multiple transition points in the central abdomen as well as mesenteric edema and ischemia in the right hemiabdomen. Given NS bolus and odansetron for nausea, and NG tube placed with 100 cc of green fluid decompressed from stomach. Labs notable for WBC count 6.58, lactate 1.2, Na 134, lipase 171, amylase 83. UA negative. EKG nsr. Hypertensive to 209/95, given labetalol. (03 Aug 2019 20:00)      PAST MEDICAL & SURGICAL HISTORY:  CAD (coronary artery disease): s/p CABG,   Anginal pain  HTN (hypertension)  Enlarged prostate  H/O hemicolectomy  H/O inguinal hernia repair: bilateral  S/P TURP: x2      MEDICATIONS  (STANDING):  atorvastatin 80 milliGRAM(s) Oral at bedtime  docusate sodium 100 milliGRAM(s) Oral two times a day  finasteride 5 milliGRAM(s) Oral daily  hydrALAZINE Injectable 10 milliGRAM(s) IV Push once  tamsulosin 0.4 milliGRAM(s) Oral at bedtime    MEDICATIONS  (PRN):  acetaminophen   Tablet .. 650 milliGRAM(s) Oral every 6 hours PRN Mild Pain (1 - 3)  ondansetron Injectable 4 milliGRAM(s) IV Push every 6 hours PRN Nausea      Allergies    No Known Allergies    Intolerances        SOCIAL HISTORY:    FAMILY HISTORY:  No pertinent family history in first degree relatives      Vital Signs Last 24 Hrs  T(C): 36.4 (03 Aug 2019 21:24), Max: 36.6 (03 Aug 2019 19:20)  T(F): 97.5 (03 Aug 2019 21:24), Max: 97.8 (03 Aug 2019 19:20)  HR: 69 (03 Aug 2019 21:24) (69 - 85)  BP: 209/99 (03 Aug 2019 21:24) (184/94 - 210/98)  BP(mean): --  RR: 16 (03 Aug 2019 21:24) (16 - 16)  SpO2: 99% (03 Aug 2019 21:24) (96% - 99%)    LABS:                        11.1   6.58  )-----------( 256      ( 03 Aug 2019 17:35 )             32.0     08-03    134<L>  |  102  |  45<H>  ----------------------------<  147<H>  4.8   |  24  |  1.30    Ca    8.8      03 Aug 2019 17:35    TPro  7.8  /  Alb  4.0  /  TBili  0.4  /  DBili  x   /  AST  17  /  ALT  36  /  AlkPhos  77  08-03    PT/INR - ( 03 Aug 2019 17:35 )   PT: 11.6 sec;   INR: 1.02 ratio         PTT - ( 03 Aug 2019 17:35 )  PTT:26.7 sec  Urinalysis Basic - ( 03 Aug 2019 18:24 )    Color: Yellow / Appearance: Clear / S.015 / pH: x  Gluc: x / Ketone: Negative  / Bili: Negative / Urobili: Negative   Blood: x / Protein: 25 mg/dL / Nitrite: Negative   Leuk Esterase: Negative / RBC: 0-2 /HPF / WBC 0-2   Sq Epi: x / Non Sq Epi: Negative / Bacteria: Negative        RADIOLOGY & ADDITIONAL STUDIES:    EXAM:  CT ABDOMEN AND PELVIS                            PROCEDURE DATE:  2019          INTERPRETATION:  CLINICAL INFORMATION: Abdominal pain.    COMPARISON: 2019.    PROCEDURE:   CT of the Abdomen and Pelvis was performed without intravenous contrast.   Intravenous contrast: None.  Oral contrast: None.  Sagittal and coronal reformats were performed.    FINDINGS:    LOWER CHEST: Postsurgical changes from prior CABG.  Partially visualized   sternal wires are intact.    LIVER: Within normal limits.  BILE DUCTS: Normal caliber.  GALLBLADDER: Within normal limits.  SPLEEN: Within normal limits.  PANCREAS: Within normal limits.  ADRENALS: Within normal limits.  KIDNEYS/URETERS: Within normal limits.    BLADDER: Within normal limits.  REPRODUCTIVE ORGANS: The prostate is not enlarged.      BOWEL: Since the prior study on 2019, the patient has undergone   partial right colectomy with right upper quadrant ileocolic anastomosis.    There is small bowel obstruction with closed loop configuration.  There   is swirling of the central mesentery with conversion of multiple   transition points in the central abdomen (2:70-82).  There is extensive   mesenteric edema in the right moses abdomen.  No pneumatosis.  PERITONEUM: Free fluid in the right moses abdomen and pelvis.  No free air   or abscess.  VESSELS: Atherosclerotic calcification of the aortoiliac tree and   proximal thigh vasculature.  RETROPERITONEUM/LYMPH NODES: No lymphadenopathy.    ABDOMINAL WALL: Postsurgical changes.  BONES: Thoracolumbar scoliosis, convex to the left with the apex at   L2-L3.  Chronic compression fracture at T8 is stable from 2019.    IMPRESSION: Closed loop bowel obstruction with extensive mesenteric edema   and free fluid concerning for ischemic bowel. Surgical consultation is   recommended.  CRITICAL VALUE: I discussed the major findings in this   report with Nhung Mcdonald on 2019 at 7:15 PM.  Critical value policy   of the hospital was followed. Read back and confirmation of receipt of   this communication was performed. This verbal communication supplements   the text report of this document.       CARLOS WINSTON M.D.,ATTENDING RADIOLOGIST  This document has been electronically signed. Aug  3 2019  7:29PM

## 2019-08-03 NOTE — H&P ADULT - ASSESSMENT
89 year old male with a past medical history of abdominal surgery (right hemicolectomy, June 2019, bilateral inguinal hernia repair), CAD s/p CABG (>20 years ago), BPH, HTN and recent hospitalization for SBO requiring surgical intervention, who presents with SBO and CT findings of mesenteric edema and ischemia in the right hemiabdomen. 89 year old male with a past medical history of abdominal surgery (right hemicolectomy, June 2019, bilateral inguinal hernia repair), CAD s/p CABG (>20 years ago), BPH, HTN and recent hospitalization for SBO 2/2 cecal volvulus requiring surgical intervention, who presents with SBO and CT findings of mesenteric edema and ischemia in the right hemiabdomen.

## 2019-08-03 NOTE — ED ADULT NURSE NOTE - NS ED NURSE REPORT GIVEN DT
-chronic  -fusiform infrarenal abdominal aortic aneurysm, measuring up to 3.6 cm seen on CT abdomen and pelvis, stable since CT abdomen/ pelvis from 7/2018 03-Aug-2019 22:12

## 2019-08-03 NOTE — CONSULT NOTE ADULT - ASSESSMENT
89y known to me from previous recent hospitalization and right hemicolectomy for cecal volvulus.  Returns today with abdominal distention, pain, since earlier this afternoon.  States he felt well before lunch then had sharp cramping mid abdominal pain which persists and has worsened significantly  Imaging in ED shows high grade mid abdominal SBO with mesenteric edema and free intraperitoneal fluid.  Closed loop obstruction.  Suspicious for internal hernia.  Will require emergent surgical exploration.  Exp Laparotomy, lysis of adhesions and possible bowel resection discussed with patient and wife present at bedside.  Risk, Benefits, and Alternatives to surgery have been discussed.  This includes but is not limited to bleeding, infection, damage to adjacent structures, need for additional surgery or interventions, adverse effects of anesthesia such as cardio-respiratory complications, prolonged intubation, cardiac arrhythmia, arrest, and or death.  Risks of forgoing surgery have also been discussed including progression of, and/or worsening of current condition which may then require urgent or emergent treatment or surgery.  Nurse supervisor notified to coordinate OR staff.  Pt admitted to hospitalist service for management of comorbidities    Hypertension treated labetalol hydralazine.

## 2019-08-03 NOTE — PRE-OP CHECKLIST - ASSESSMENT, HISTORY & PHYSICAL COMPLETED AND ON MEDICAL RECORD
Detail Level: Detailed Quality 402: Tobacco Use And Help With Quitting Among Adolescents: Patient screened for tobacco and is a smoker AND received Cessation Counseling done

## 2019-08-03 NOTE — H&P ADULT - PROBLEM SELECTOR PLAN 5
- Continue home colace  - VTE prophylaxis after surgery - VTE prophylaxis after surgery  - Continue home colace    IMPROVE VTE Individual Risk Assessment  RISK                                                                Points  [  ] Previous VTE                                                  3  [  ] Thrombophilia                                               2  [  ] Lower limb paralysis                                      2        (unable to hold up >15 seconds)    [  ] Current Cancer                                              2         (within 6 months)  [  ] Immobilization > 24 hrs                                1  [  ] ICU/CCU stay > 24 hours                              1  [ x ] Age > 60                                                      1    IMPROVE VTE Score: 1    IMPROVE Score 0-1: Low Risk, No VTE prophylaxis required for most patients, encourage ambulation.

## 2019-08-03 NOTE — H&P ADULT - PROBLEM SELECTOR PLAN 3
- Continue home meds finasteride 5 mg daily and tamsulosin 0.4 mg for BPH.  - Continue home tylenol for pain related to prostate/urination - Continue home meds finasteride 5 mg daily and tamsulosin 0.4 mg for BPH.  - Continue home tylenol PRN for pain related to prostate/urination - Continue home meds finasteride 5 mg daily and tamsulosin 0.4 mg for BPH when no longer npo.  - Continue home tylenol PRN for pain related to prostate/urination - urinary retention in ED. wade placed. will d/c when clinically improving for trial of void.  - Continue home meds finasteride 5 mg daily and tamsulosin 0.4 mg for BPH when no longer npo.  - Continue home tylenol PRN for pain related to prostate/urination

## 2019-08-03 NOTE — ED PROVIDER NOTE - ATTENDING CONTRIBUTION TO CARE
Pt is a 89 yo male who has hx of cad cabg pmd dr Federico Esparza, just underwent surgery at this hospital for bowel obstruction due to volvulous 13 days ago by dr Tse.  he had uneventful recovery and was doing well. but today at 1 pm he had sudden onset of severe pain and nausea vomiting occasionally.  he was bib wife for eval  neve a smoker not a drinker no allergies  on eval:  wd wn w male subtle pallor holding an emesis basin no distress  cor rrr no m  chest cta  abd well healed abd with intermittent high pitched noises and occas normal borborgymy no guard no rebound abd soft   back spine kyphotic no cvat  ext normal  skin subtle pallor neuro normal  plan  ro recurrent bowel obstn pancreatitis pn or other post op complications treat pain and nausea consult surgeon after ct

## 2019-08-03 NOTE — H&P ADULT - NSHPPHYSICALEXAM_GEN_ALL_CORE
PHYSICAL EXAM:  Constitutional: Resting in no acute distress. Well-appearing.  HEENT: Atraumatic, normocephalic. Airway patent.  Respiratory: CTAB  Cardiovascular: RRR, no m/r/g  Gastrointestinal: Hypoactive BS in all quadrants. Soft, non-distended. Tender to light palpation in the RUQ, epigastric and umbilical regions  Extremities: No lower extremity edema  Vascular: Skin is warm and well-perfused.  Neurological: AOx4, no focal deficits.  Skin: No lesions Vital Signs Last 24 Hrs  T(C): 36.4 (03 Aug 2019 21:40), Max: 36.6 (03 Aug 2019 19:20)  T(F): 97.5 (03 Aug 2019 21:40), Max: 97.8 (03 Aug 2019 19:20)  HR: 72 (03 Aug 2019 21:40) (69 - 85)  BP: 151/75 (03 Aug 2019 21:40) (151/75 - 210/98)  BP(mean): --  RR: 16 (03 Aug 2019 21:40) (16 - 16)  SpO2: 97% (03 Aug 2019 21:40) (96% - 99%)    PHYSICAL EXAM:  Constitutional: Resting in no acute distress. Well-appearing.  HEENT: Atraumatic, normocephalic. Airway patent.  Respiratory: CTAB  Cardiovascular: RRR, no m/r/g  Gastrointestinal: Hypoactive BS in all quadrants. Soft, non-distended. Tender to light palpation in the RUQ, epigastric and umbilical regions  Extremities: No lower extremity edema  Vascular: Skin is warm and well-perfused.  Neurological: AOx4, no focal deficits.  Skin: No lesions

## 2019-08-03 NOTE — ED PROVIDER NOTE - OBJECTIVE STATEMENT
89 y/o male presents to ED c/o upper abdominal pain x 4 hours. Rates pain 8/10, no radiation of pain, gradual onset, no qualifying factors. +vomiting x 1 in ED. Denies any other complaints. Denies f/c, chest pain, sob 89 y/o male presents to ED c/o upper abdominal pain x 4 hours. Rates pain 8/10, no radiation of pain, gradual onset, no qualifying factors. +vomiting x 1 in ED. Denies any other complaints. Denies f/c, chest pain, sob, numbness, tingling, dysuria, hematuria, urinary frequency, melena, hematochezia, diarrhea, hematemesis.

## 2019-08-03 NOTE — H&P ADULT - PROBLEM SELECTOR PLAN 1
- Hx of SBO requiring right hemicolectomy by Dr. Tse in June 2019  - CT showing mesenteric edema and ischemia  - WBC 6.58, lactate 1.2. Will recheck lactate.  - Surgery consulted, likely going to OR for internal hernia   - NPO for surgery  - Type and Screen  -Odansetron 4 mg q6 PRN for nausea - Hx of SBO requiring right hemicolectomy by Dr. Tse in June 2019  - CT showing mesenteric edema and ischemia  - WBC 6.58, lactate 1.2. Will recheck lactate.  - Surgery (Dr Rooney) consulted, likely going to OR tonight for internal hernia   - NPO for surgery  - Type and Screen  - IV Zofran 4 mg q6 PRN for nausea/vomiting - Hx of SBO 2/2 cecal volvulus requiring right hemicolectomy by Dr. Tse in June 2019  - CT showing mesenteric edema and ischemia  - WBC 6.58, lactate 1.2. Will recheck lactate.  - Surgery (Dr Rooney) consulted, likely going to OR tonight due to concern for internal hernia   - NPO for surgery  - Type and Screen  - IV Zofran 4 mg q6 PRN for nausea/vomiting - Hx of SBO 2/2 cecal volvulus requiring right hemicolectomy by Dr. Tse in June 2019  - NGT placed in ED  - CT showing SBO with mesenteric edema and ischemia  - WBC 6.58, lactate 1.2 , lactate trending up, repeat 2.2 suspicious for ischemic bowel  - Surgery (Dr Rooney) consulted. pt to go urgently to OR for exlap  - NPO for surgery  - Type and Screen  - IV Zofran 4 mg q6 PRN for nausea/vomiting  - pain control - tylenol mild, morphine 2mg q 4 moderate, dilaudid .5mg q4 severe pain.

## 2019-08-03 NOTE — ED ADULT NURSE NOTE - OBJECTIVE STATEMENT
89 year old male presents to the ED complaining of abdominal pain. Patient reports the pain is an 8/10, gradual onset. Patient states the pain only began this evening. Patient reports associated nausea/vomiting. Denies fever/chills. Denies change in bowel or bladder. Denies urinary symptoms. Denies rectal bleeding. Patient denies weakness. Denies chest pain, shortness of breath or palpitations. Rectal temp on arrival 96.2. Denies recent illness/sick contacts. Nontender on exam.

## 2019-08-03 NOTE — ED PROVIDER NOTE - PROGRESS NOTE DETAILS
spoke with radiologist who called to inform me pt with bowel obstruction and mesenteric edema with concern for ischemic bowel, general surgery dr heller paged. NG tube to be placed. pt resting comfortably in bed. spoke with general surgery dr vale who will see pt.

## 2019-08-03 NOTE — CONSULT NOTE ADULT - NEGATIVE GASTROINTESTINAL SYMPTOMS
no constipation/no change in bowel habits/no hematochezia/no steatorrhea/no hiccoughs/no melena/no jaundice

## 2019-08-04 LAB
ANION GAP SERPL CALC-SCNC: 9 MMOL/L — SIGNIFICANT CHANGE UP (ref 5–17)
APTT BLD: 24.3 SEC — LOW (ref 28.5–37)
BASOPHILS # BLD AUTO: 0 K/UL — SIGNIFICANT CHANGE UP (ref 0–0.2)
BASOPHILS NFR BLD AUTO: 0 % — SIGNIFICANT CHANGE UP (ref 0–2)
BUN SERPL-MCNC: 30 MG/DL — HIGH (ref 7–23)
CALCIUM SERPL-MCNC: 7.8 MG/DL — LOW (ref 8.5–10.1)
CHLORIDE SERPL-SCNC: 105 MMOL/L — SIGNIFICANT CHANGE UP (ref 96–108)
CO2 SERPL-SCNC: 23 MMOL/L — SIGNIFICANT CHANGE UP (ref 22–31)
CREAT SERPL-MCNC: 0.99 MG/DL — SIGNIFICANT CHANGE UP (ref 0.5–1.3)
CULTURE RESULTS: SIGNIFICANT CHANGE UP
EOSINOPHIL # BLD AUTO: 0.1 K/UL — SIGNIFICANT CHANGE UP (ref 0–0.5)
EOSINOPHIL NFR BLD AUTO: 1 % — SIGNIFICANT CHANGE UP (ref 0–6)
GLUCOSE SERPL-MCNC: 107 MG/DL — HIGH (ref 70–99)
HCT VFR BLD CALC: 33 % — LOW (ref 39–50)
HGB BLD-MCNC: 11.4 G/DL — LOW (ref 13–17)
INR BLD: 1.05 RATIO — SIGNIFICANT CHANGE UP (ref 0.88–1.16)
LACTATE SERPL-SCNC: 4.9 MMOL/L — CRITICAL HIGH (ref 0.7–2)
LYMPHOCYTES # BLD AUTO: 0.41 K/UL — LOW (ref 1–3.3)
LYMPHOCYTES # BLD AUTO: 4 % — LOW (ref 13–44)
MCHC RBC-ENTMCNC: 33.2 PG — SIGNIFICANT CHANGE UP (ref 27–34)
MCHC RBC-ENTMCNC: 34.5 GM/DL — SIGNIFICANT CHANGE UP (ref 32–36)
MCV RBC AUTO: 96.2 FL — SIGNIFICANT CHANGE UP (ref 80–100)
MONOCYTES # BLD AUTO: 0.72 K/UL — SIGNIFICANT CHANGE UP (ref 0–0.9)
MONOCYTES NFR BLD AUTO: 7 % — SIGNIFICANT CHANGE UP (ref 2–14)
NEUTROPHILS # BLD AUTO: 8.79 K/UL — HIGH (ref 1.8–7.4)
NEUTROPHILS NFR BLD AUTO: 72 % — SIGNIFICANT CHANGE UP (ref 43–77)
NRBC # BLD: SIGNIFICANT CHANGE UP /100 WBCS (ref 0–0)
PLATELET # BLD AUTO: 236 K/UL — SIGNIFICANT CHANGE UP (ref 150–400)
POTASSIUM SERPL-MCNC: 4.5 MMOL/L — SIGNIFICANT CHANGE UP (ref 3.5–5.3)
POTASSIUM SERPL-SCNC: 4.5 MMOL/L — SIGNIFICANT CHANGE UP (ref 3.5–5.3)
PROTHROM AB SERPL-ACNC: 11.9 SEC — SIGNIFICANT CHANGE UP (ref 10–12.9)
RBC # BLD: 3.43 M/UL — LOW (ref 4.2–5.8)
RBC # FLD: 16.5 % — HIGH (ref 10.3–14.5)
SODIUM SERPL-SCNC: 137 MMOL/L — SIGNIFICANT CHANGE UP (ref 135–145)
SPECIMEN SOURCE: SIGNIFICANT CHANGE UP
WBC # BLD: 10.22 K/UL — SIGNIFICANT CHANGE UP (ref 3.8–10.5)
WBC # FLD AUTO: 10.22 K/UL — SIGNIFICANT CHANGE UP (ref 3.8–10.5)

## 2019-08-04 PROCEDURE — 99232 SBSQ HOSP IP/OBS MODERATE 35: CPT

## 2019-08-04 PROCEDURE — 99024 POSTOP FOLLOW-UP VISIT: CPT

## 2019-08-04 RX ORDER — METOPROLOL TARTRATE 50 MG
50 TABLET ORAL DAILY
Refills: 0 | Status: DISCONTINUED | OUTPATIENT
Start: 2019-08-04 | End: 2019-08-09

## 2019-08-04 RX ORDER — NIFEDIPINE 30 MG
60 TABLET, EXTENDED RELEASE 24 HR ORAL DAILY
Refills: 0 | Status: DISCONTINUED | OUTPATIENT
Start: 2019-08-04 | End: 2019-08-09

## 2019-08-04 RX ORDER — MORPHINE SULFATE 50 MG/1
0.5 CAPSULE, EXTENDED RELEASE ORAL
Refills: 0 | Status: DISCONTINUED | OUTPATIENT
Start: 2019-08-04 | End: 2019-08-04

## 2019-08-04 RX ORDER — ACETAMINOPHEN 500 MG
650 TABLET ORAL EVERY 6 HOURS
Refills: 0 | Status: DISCONTINUED | OUTPATIENT
Start: 2019-08-04 | End: 2019-08-09

## 2019-08-04 RX ORDER — METRONIDAZOLE 500 MG
500 TABLET ORAL EVERY 8 HOURS
Refills: 0 | Status: COMPLETED | OUTPATIENT
Start: 2019-08-04 | End: 2019-08-04

## 2019-08-04 RX ORDER — FINASTERIDE 5 MG/1
5 TABLET, FILM COATED ORAL DAILY
Refills: 0 | Status: DISCONTINUED | OUTPATIENT
Start: 2019-08-04 | End: 2019-08-10

## 2019-08-04 RX ORDER — ONDANSETRON 8 MG/1
4 TABLET, FILM COATED ORAL ONCE
Refills: 0 | Status: DISCONTINUED | OUTPATIENT
Start: 2019-08-04 | End: 2019-08-04

## 2019-08-04 RX ORDER — HYDROMORPHONE HYDROCHLORIDE 2 MG/ML
0.5 INJECTION INTRAMUSCULAR; INTRAVENOUS; SUBCUTANEOUS EVERY 4 HOURS
Refills: 0 | Status: DISCONTINUED | OUTPATIENT
Start: 2019-08-04 | End: 2019-08-09

## 2019-08-04 RX ORDER — ENOXAPARIN SODIUM 100 MG/ML
40 INJECTION SUBCUTANEOUS DAILY
Refills: 0 | Status: DISCONTINUED | OUTPATIENT
Start: 2019-08-05 | End: 2019-08-08

## 2019-08-04 RX ORDER — MORPHINE SULFATE 50 MG/1
0.5 CAPSULE, EXTENDED RELEASE ORAL
Refills: 0 | Status: DISCONTINUED | OUTPATIENT
Start: 2019-08-04 | End: 2019-08-08

## 2019-08-04 RX ORDER — SODIUM CHLORIDE 9 MG/ML
1000 INJECTION, SOLUTION INTRAVENOUS
Refills: 0 | Status: DISCONTINUED | OUTPATIENT
Start: 2019-08-04 | End: 2019-08-04

## 2019-08-04 RX ORDER — TAMSULOSIN HYDROCHLORIDE 0.4 MG/1
0.4 CAPSULE ORAL AT BEDTIME
Refills: 0 | Status: DISCONTINUED | OUTPATIENT
Start: 2019-08-04 | End: 2019-08-05

## 2019-08-04 RX ORDER — BENZOCAINE 10 %
1 GEL (GRAM) MUCOUS MEMBRANE ONCE
Refills: 0 | Status: COMPLETED | OUTPATIENT
Start: 2019-08-04 | End: 2019-08-04

## 2019-08-04 RX ORDER — CEFAZOLIN SODIUM 1 G
2000 VIAL (EA) INJECTION EVERY 8 HOURS
Refills: 0 | Status: COMPLETED | OUTPATIENT
Start: 2019-08-04 | End: 2019-08-04

## 2019-08-04 RX ORDER — MORPHINE SULFATE 50 MG/1
1 CAPSULE, EXTENDED RELEASE ORAL
Refills: 0 | Status: DISCONTINUED | OUTPATIENT
Start: 2019-08-04 | End: 2019-08-04

## 2019-08-04 RX ORDER — MORPHINE SULFATE 50 MG/1
1 CAPSULE, EXTENDED RELEASE ORAL
Refills: 0 | Status: DISCONTINUED | OUTPATIENT
Start: 2019-08-04 | End: 2019-08-08

## 2019-08-04 RX ORDER — MORPHINE SULFATE 50 MG/1
2 CAPSULE, EXTENDED RELEASE ORAL EVERY 4 HOURS
Refills: 0 | Status: DISCONTINUED | OUTPATIENT
Start: 2019-08-04 | End: 2019-08-09

## 2019-08-04 RX ORDER — SODIUM CHLORIDE 9 MG/ML
1000 INJECTION, SOLUTION INTRAVENOUS
Refills: 0 | Status: DISCONTINUED | OUTPATIENT
Start: 2019-08-04 | End: 2019-08-06

## 2019-08-04 RX ORDER — ONDANSETRON 8 MG/1
4 TABLET, FILM COATED ORAL EVERY 6 HOURS
Refills: 0 | Status: DISCONTINUED | OUTPATIENT
Start: 2019-08-04 | End: 2019-08-10

## 2019-08-04 RX ADMIN — MORPHINE SULFATE 2 MILLIGRAM(S): 50 CAPSULE, EXTENDED RELEASE ORAL at 15:25

## 2019-08-04 RX ADMIN — Medication 100 MILLIGRAM(S): at 05:27

## 2019-08-04 RX ADMIN — Medication 1 SPRAY(S): at 20:09

## 2019-08-04 RX ADMIN — MORPHINE SULFATE 2 MILLIGRAM(S): 50 CAPSULE, EXTENDED RELEASE ORAL at 08:29

## 2019-08-04 RX ADMIN — MORPHINE SULFATE 2 MILLIGRAM(S): 50 CAPSULE, EXTENDED RELEASE ORAL at 20:14

## 2019-08-04 RX ADMIN — Medication 100 MILLIGRAM(S): at 15:37

## 2019-08-04 RX ADMIN — FINASTERIDE 5 MILLIGRAM(S): 5 TABLET, FILM COATED ORAL at 12:05

## 2019-08-04 RX ADMIN — Medication 50 MILLIGRAM(S): at 05:27

## 2019-08-04 RX ADMIN — MORPHINE SULFATE 2 MILLIGRAM(S): 50 CAPSULE, EXTENDED RELEASE ORAL at 08:11

## 2019-08-04 RX ADMIN — MORPHINE SULFATE 2 MILLIGRAM(S): 50 CAPSULE, EXTENDED RELEASE ORAL at 04:19

## 2019-08-04 RX ADMIN — SODIUM CHLORIDE 100 MILLILITER(S): 9 INJECTION, SOLUTION INTRAVENOUS at 12:13

## 2019-08-04 RX ADMIN — MORPHINE SULFATE 2 MILLIGRAM(S): 50 CAPSULE, EXTENDED RELEASE ORAL at 04:04

## 2019-08-04 RX ADMIN — SODIUM CHLORIDE 100 MILLILITER(S): 9 INJECTION, SOLUTION INTRAVENOUS at 22:32

## 2019-08-04 RX ADMIN — TAMSULOSIN HYDROCHLORIDE 0.4 MILLIGRAM(S): 0.4 CAPSULE ORAL at 20:46

## 2019-08-04 RX ADMIN — MORPHINE SULFATE 2 MILLIGRAM(S): 50 CAPSULE, EXTENDED RELEASE ORAL at 19:53

## 2019-08-04 RX ADMIN — Medication 100 MILLIGRAM(S): at 14:59

## 2019-08-04 RX ADMIN — Medication 100 MILLIGRAM(S): at 06:11

## 2019-08-04 RX ADMIN — SODIUM CHLORIDE 100 MILLILITER(S): 9 INJECTION, SOLUTION INTRAVENOUS at 02:49

## 2019-08-04 RX ADMIN — MORPHINE SULFATE 2 MILLIGRAM(S): 50 CAPSULE, EXTENDED RELEASE ORAL at 14:55

## 2019-08-04 RX ADMIN — Medication 60 MILLIGRAM(S): at 05:27

## 2019-08-04 NOTE — BRIEF OPERATIVE NOTE - NSICDXBRIEFPROCEDURE_GEN_ALL_CORE_FT
PROCEDURES:  Open lysis of intestinal adhesions 04-Aug-2019 00:59:04 EXTENSIVE, REPAIR MULTIPLE SMALL BOWEL SEROSAL TEAR Faraz Still  Exploratory laparotomy with small bowel resection 04-Aug-2019 00:58:24  Faraz Still

## 2019-08-04 NOTE — PROGRESS NOTE ADULT - SUBJECTIVE AND OBJECTIVE BOX
POD # 0 s/p Exp lap, extensive lysis of adhesions, reduction of internal hernia with segmental SB resection for ischemia .    Vital Signs Last 24 Hrs  T(C): 36.8 (04 Aug 2019 07:49), Max: 36.8 (04 Aug 2019 07:49)  T(F): 98.3 (04 Aug 2019 07:49), Max: 98.3 (04 Aug 2019 07:49)  HR: 108 (04 Aug 2019 07:49) (69 - 116)  BP: 130/81 (04 Aug 2019 07:49) (106/64 - 210/98)  BP(mean): --  RR: 16 (04 Aug 2019 07:49) (15 - 24)  SpO2: 95% (04 Aug 2019 07:49) (95% - 100%)    08-03 @ 07:01  -  08-04 @ 07:00  --------------------------------------------------------  IN: 550 mL / OUT: 450 mL / NET: 100 mL                              11.4   10.22 )-----------( 236      ( 04 Aug 2019 08:14 )             33.0                         11.1   6.58  )-----------( 256      ( 03 Aug 2019 17:35 )             32.0       08-04    137  |  105  |  30<H>  ----------------------------<  107<H>  4.5   |  23  |  0.99    Ca    7.8<L>      04 Aug 2019 08:14    TPro  7.8  /  Alb  4.0  /  TBili  0.4  /  DBili  x   /  AST  17  /  ALT  36  /  AlkPhos  77  08-03      Physical Exam:  Awake alert and oriented x3 in no acute distress. NCAT, PERRLA, EOMI  Lungs clear bilaterally without wheezes rhonchi or rales.  Regular rate and rhythm  Abdomen soft, nontender, nondistended, positive bowel sounds in all 4 quadrants. No evidence of hernia or masses. Surgical incisions remained well approximated and healing appropriately without erythema, induration or fluctuance. Dressings remained clean dry and intact  Extremities without edema or tenderness.

## 2019-08-04 NOTE — PROGRESS NOTE ADULT - SUBJECTIVE AND OBJECTIVE BOX
Patient is a 89y old  Male who presents with a chief complaint of SBO (03 Aug 2019 21:28)        HPI:  89 year old male with a past medical history of abdominal surgery (right hemicolectomy, 2019, bilateral inguinal hernia repair), CAD s/p CABG (>20 years ago), BPH, HTN and recent hospitalization for 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 He reports that he gradually developed sharp, severe pain in his abdomen around 1:00PM this afternoon which remains unchanged. The pain is localized to his RUQ and epigastric/umbilical region. He denies radiation of pain, aggravating/alleviating factors. He has been nauseous with a minimal amount of vomiting. He has not passed gas or had a BM since the onset of symptoms. He denies a family history of GI cancer. He denies fevers, chills, recent weight loss, chest pain, shortness of breath, hematochezia, melena, dysuria, HA, blurry vision.    ED Course: Vital Signs: T(F): 97.8, HR: 70 - 85, BP: 184/94 - 209/95, RR: 16, SpO2: 96% - 98%. CT abdomen and pelvis revealed small bowel obstruction with multiple transition points in the central abdomen as well as mesenteric edema and ischemia in the right hemiabdomen. Given NS bolus and odansetron for nausea, and NG tube placed with 100 cc of green fluid decompressed from stomach. Labs notable for WBC count 6.58, lactate 1.2, Na 134, lipase 171, amylase 83. UA negative. EKG nsr. Hypertensive to 209/95, given labetalol and hydralazine. (03 Aug 2019 20:00)      SUBJECTIVE & OBJECTIVE: Pt seen and examined at bedside, s/p OR     PHYSICAL EXAM:  T(C): 36.8 (19 @ 07:49), Max: 36.8 (19 @ 07:49)  HR: 108 (19 @ 07:49) (69 - 116)  BP: 130/81 (19 @ 07:49) (106/64 - 210/98)  RR: 16 (19 @ 07:49) (15 - 24)  SpO2: 95% (19 @ 07:49) (95% - 100%)  Wt(kg): -- Height (cm): 170.18 ( @ 21:40)  Weight (kg): 63.5 ( @ 21:40)  BMI (kg/m2): 21.9 ( @ 21:40)  BSA (m2): 1.74 ( @ 21:40)  GENERAL: NAD, well-groomed, well-developed  NECK: Supple, No JVD  NERVOUS SYSTEM:  Alert & Oriented X3,   CHEST/LUNG: Clear to auscultation bilaterally; No rales, rhonchi, wheezing, or rubs  HEART: Regular rate and rhythm; No murmurs, rubs, or gallops  ABDOMEN: BS+  EXTREMITIES:  2+ Peripheral Pulses, No clubbing, cyanosis, or edema        MEDICATIONS  (STANDING):  ceFAZolin   IVPB 2000 milliGRAM(s) IV Intermittent every 8 hours  finasteride 5 milliGRAM(s) Oral daily  lactated ringers. 1000 milliLiter(s) (100 mL/Hr) IV Continuous <Continuous>  metoprolol succinate ER 50 milliGRAM(s) Oral daily  metroNIDAZOLE  IVPB 500 milliGRAM(s) IV Intermittent every 8 hours  NIFEdipine XL 60 milliGRAM(s) Oral daily  tamsulosin 0.4 milliGRAM(s) Oral at bedtime    MEDICATIONS  (PRN):  acetaminophen   Tablet .. 650 milliGRAM(s) Oral every 6 hours PRN Mild Pain (1 - 3)  HYDROmorphone  Injectable 0.5 milliGRAM(s) IV Push every 4 hours PRN Severe Pain (7 - 10)  morphine  - Injectable 2 milliGRAM(s) IV Push every 4 hours PRN Moderate Pain (4 - 6)  morphine  - Injectable 1 milliGRAM(s) IV Push every 10 minutes PRN Severe Pain (7 - 10)  morphine  - Injectable 0.5 milliGRAM(s) IV Push every 10 minutes PRN Moderate Pain (4 - 6)  ondansetron Injectable 4 milliGRAM(s) IV Push every 6 hours PRN Nausea      LABS:                        11.4   10.22 )-----------( 236      ( 04 Aug 2019 08:14 )             33.0     08-    137  |  105  |  30<H>  ----------------------------<  107<H>  4.5   |  23  |  0.99    Ca    7.8<L>      04 Aug 2019 08:14    TPro  7.8  /  Alb  4.0  /  TBili  0.4  /  DBili  x   /  AST  17  /  ALT  36  /  AlkPhos  77  08-03    PT/INR - ( 04 Aug 2019 08:14 )   PT: 11.9 sec;   INR: 1.05 ratio         PTT - ( 04 Aug 2019 08:14 )  PTT:24.3 sec  Urinalysis Basic - ( 03 Aug 2019 18:24 )    Color: Yellow / Appearance: Clear / S.015 / pH: x  Gluc: x / Ketone: Negative  / Bili: Negative / Urobili: Negative   Blood: x / Protein: 25 mg/dL / Nitrite: Negative   Leuk Esterase: Negative / RBC: 0-2 /HPF / WBC 0-2   Sq Epi: x / Non Sq Epi: Negative / Bacteria: Negative        CAPILLARY BLOOD GLUCOSE          CAPILLARY BLOOD GLUCOSE        CAPILLARY BLOOD GLUCOSE                RECENT CULTURES:      RADIOLOGY & ADDITIONAL TESTS:                        DVT/GI ppx  Discussed with pt @ bedside

## 2019-08-04 NOTE — PROGRESS NOTE ADULT - ASSESSMENT
Complains of abdominal tenderness as expected so soon after the procedure.  Denies nausea or vomiting.  NGT remains to low intermittent suction.  Minimal bilious aspirate.  Weaver to gravity with clear yellow urine.  Pt with anticipated prolonged post operative ileus given extent of dissection, enterolysis and resection.  Continue DVT and GI prophylaxis.  Pain control.

## 2019-08-04 NOTE — PROGRESS NOTE ADULT - ASSESSMENT
89 year old male with a past medical history of abdominal surgery (right hemicolectomy, June 2019, bilateral inguinal hernia repair), CAD s/p CABG (>20 years ago), BPH, HTN and recent hospitalization for 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 found to have hernia with extesnive diffuse adhesions with ischemic necrosis of small bowel, s/p open lysis of intenstinal adhesions with small bowel resection

## 2019-08-04 NOTE — BRIEF OPERATIVE NOTE - NSICDXBRIEFPREOP_GEN_ALL_CORE_FT
PRE-OP DIAGNOSIS:  Small bowel obstruction 04-Aug-2019 00:59:42 CLOSED LOOP OBSTRCUTION , INTERNAL HERNIA WITH EXTENSIVE DIFFUSE ADHESIONS Faraz Still

## 2019-08-05 LAB
ANION GAP SERPL CALC-SCNC: 7 MMOL/L — SIGNIFICANT CHANGE UP (ref 5–17)
BASOPHILS # BLD AUTO: 0.02 K/UL — SIGNIFICANT CHANGE UP (ref 0–0.2)
BASOPHILS NFR BLD AUTO: 0.2 % — SIGNIFICANT CHANGE UP (ref 0–2)
BUN SERPL-MCNC: 23 MG/DL — SIGNIFICANT CHANGE UP (ref 7–23)
CALCIUM SERPL-MCNC: 7.9 MG/DL — LOW (ref 8.5–10.1)
CHLORIDE SERPL-SCNC: 103 MMOL/L — SIGNIFICANT CHANGE UP (ref 96–108)
CO2 SERPL-SCNC: 27 MMOL/L — SIGNIFICANT CHANGE UP (ref 22–31)
CREAT SERPL-MCNC: 0.79 MG/DL — SIGNIFICANT CHANGE UP (ref 0.5–1.3)
EOSINOPHIL # BLD AUTO: 0.16 K/UL — SIGNIFICANT CHANGE UP (ref 0–0.5)
EOSINOPHIL NFR BLD AUTO: 1.8 % — SIGNIFICANT CHANGE UP (ref 0–6)
GLUCOSE SERPL-MCNC: 104 MG/DL — HIGH (ref 70–99)
HCT VFR BLD CALC: 26.4 % — LOW (ref 39–50)
HGB BLD-MCNC: 9.1 G/DL — LOW (ref 13–17)
IMM GRANULOCYTES NFR BLD AUTO: 0.6 % — SIGNIFICANT CHANGE UP (ref 0–1.5)
LYMPHOCYTES # BLD AUTO: 0.64 K/UL — LOW (ref 1–3.3)
LYMPHOCYTES # BLD AUTO: 7.2 % — LOW (ref 13–44)
MCHC RBC-ENTMCNC: 32.2 PG — SIGNIFICANT CHANGE UP (ref 27–34)
MCHC RBC-ENTMCNC: 34.5 GM/DL — SIGNIFICANT CHANGE UP (ref 32–36)
MCV RBC AUTO: 93.3 FL — SIGNIFICANT CHANGE UP (ref 80–100)
MONOCYTES # BLD AUTO: 0.69 K/UL — SIGNIFICANT CHANGE UP (ref 0–0.9)
MONOCYTES NFR BLD AUTO: 7.8 % — SIGNIFICANT CHANGE UP (ref 2–14)
NEUTROPHILS # BLD AUTO: 7.27 K/UL — SIGNIFICANT CHANGE UP (ref 1.8–7.4)
NEUTROPHILS NFR BLD AUTO: 82.4 % — HIGH (ref 43–77)
NRBC # BLD: 0 /100 WBCS — SIGNIFICANT CHANGE UP (ref 0–0)
PLATELET # BLD AUTO: 173 K/UL — SIGNIFICANT CHANGE UP (ref 150–400)
POTASSIUM SERPL-MCNC: 4.2 MMOL/L — SIGNIFICANT CHANGE UP (ref 3.5–5.3)
POTASSIUM SERPL-SCNC: 4.2 MMOL/L — SIGNIFICANT CHANGE UP (ref 3.5–5.3)
RBC # BLD: 2.83 M/UL — LOW (ref 4.2–5.8)
RBC # FLD: 15.8 % — HIGH (ref 10.3–14.5)
SODIUM SERPL-SCNC: 137 MMOL/L — SIGNIFICANT CHANGE UP (ref 135–145)
WBC # BLD: 8.83 K/UL — SIGNIFICANT CHANGE UP (ref 3.8–10.5)
WBC # FLD AUTO: 8.83 K/UL — SIGNIFICANT CHANGE UP (ref 3.8–10.5)

## 2019-08-05 PROCEDURE — 99233 SBSQ HOSP IP/OBS HIGH 50: CPT

## 2019-08-05 PROCEDURE — 99024 POSTOP FOLLOW-UP VISIT: CPT

## 2019-08-05 RX ORDER — TAMSULOSIN HYDROCHLORIDE 0.4 MG/1
0.4 CAPSULE ORAL AT BEDTIME
Refills: 0 | Status: DISCONTINUED | OUTPATIENT
Start: 2019-08-05 | End: 2019-08-10

## 2019-08-05 RX ORDER — BENZOCAINE 10 %
1 GEL (GRAM) MUCOUS MEMBRANE ONCE
Refills: 0 | Status: COMPLETED | OUTPATIENT
Start: 2019-08-05 | End: 2019-08-05

## 2019-08-05 RX ADMIN — Medication 60 MILLIGRAM(S): at 05:46

## 2019-08-05 RX ADMIN — SODIUM CHLORIDE 100 MILLILITER(S): 9 INJECTION, SOLUTION INTRAVENOUS at 08:04

## 2019-08-05 RX ADMIN — MORPHINE SULFATE 2 MILLIGRAM(S): 50 CAPSULE, EXTENDED RELEASE ORAL at 11:39

## 2019-08-05 RX ADMIN — HYDROMORPHONE HYDROCHLORIDE 0.5 MILLIGRAM(S): 2 INJECTION INTRAMUSCULAR; INTRAVENOUS; SUBCUTANEOUS at 04:10

## 2019-08-05 RX ADMIN — MORPHINE SULFATE 2 MILLIGRAM(S): 50 CAPSULE, EXTENDED RELEASE ORAL at 12:09

## 2019-08-05 RX ADMIN — FINASTERIDE 5 MILLIGRAM(S): 5 TABLET, FILM COATED ORAL at 11:43

## 2019-08-05 RX ADMIN — SODIUM CHLORIDE 100 MILLILITER(S): 9 INJECTION, SOLUTION INTRAVENOUS at 18:59

## 2019-08-05 RX ADMIN — ENOXAPARIN SODIUM 40 MILLIGRAM(S): 100 INJECTION SUBCUTANEOUS at 11:43

## 2019-08-05 RX ADMIN — TAMSULOSIN HYDROCHLORIDE 0.4 MILLIGRAM(S): 0.4 CAPSULE ORAL at 16:19

## 2019-08-05 RX ADMIN — Medication 50 MILLIGRAM(S): at 05:46

## 2019-08-05 RX ADMIN — Medication 1 SPRAY(S): at 03:22

## 2019-08-05 RX ADMIN — HYDROMORPHONE HYDROCHLORIDE 0.5 MILLIGRAM(S): 2 INJECTION INTRAMUSCULAR; INTRAVENOUS; SUBCUTANEOUS at 04:25

## 2019-08-05 NOTE — CHART NOTE - NSCHARTNOTEFT_GEN_A_CORE
Called by RN for Pt c/o bladder discomfort.  RN states bladder scan with 260 mL.  Patient stating he was able to urinate a small amount at around 6pm however since then has not been able to void.  Weaver removed at 1130 am on 8/5/19.  Patient endorses a history of BPH and urinary retention.  Denies chest pain, SOB.      VS:  T97.8  HR 95  /65  RR 16  SpO2 100      Physical :  Gen- NAD, ncat, laying in bed, appears anxious  Abdomen- tender near incision, +BS, no guarding, no rebound  : distended bladder  Ext- no edema, 2+ pulses b/l    LABS:                        9.1    8.83  )-----------( 173      ( 05 Aug 2019 05:53 )             26.4     08-05    137  |  103  |  23  ----------------------------<  104<H>  4.2   |  27  |  0.79    Ca    7.9<L>      05 Aug 2019 05:53      PT/INR - ( 04 Aug 2019 08:14 )   PT: 11.9 sec;   INR: 1.05 ratio         PTT - ( 04 Aug 2019 08:14 )  PTT:24.3 sec            Assessment/Plan  89 year old male with a past medical history of abdominal surgery (right hemicolectomy, June 2019, bilateral inguinal hernia repair), CAD s/p CABG (>20 years ago), BPH, HTN and recent hospitalization for 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 found to have hernia with extensive diffuse adhesions with ischemic necrosis of small bowel, s/p open lysis of intestinal adhesions with small bowel resection.    - Straight cath with 360mL output  - Monitor urinary output  - RN to call with changes.      ADDENDUM:  Patient seen and examined at bedside at around 11:45pm 8/5/19.  Patient did not remember having the nurse straight catheterize him.  Not complaining of any pain or discomfort at this time.     Physical exam: NTND suprapubic area    Plan:    Bladder scan at 3am.  Continue monitoring urinary output.   RN to call with changes. Called by RN for Pt c/o bladder discomfort.  RN states bladder scan with 260 mL.  Patient stating he was able to urinate a small amount at around 6pm however since then has not been able to void.  Weaver removed at 1130 am on 8/5/19.  Patient endorses a history of BPH and urinary retention.  Denies chest pain, SOB.      VS:  T97.8  HR 95  /65  RR 16  SpO2 100      Physical :  Gen- NAD, ncat, laying in bed, appears anxious  Abdomen- tender near incision, +BS, no guarding, no rebound  : distended bladder  Ext- no edema, 2+ pulses b/l    LABS:                        9.1    8.83  )-----------( 173      ( 05 Aug 2019 05:53 )             26.4     08-05    137  |  103  |  23  ----------------------------<  104<H>  4.2   |  27  |  0.79    Ca    7.9<L>      05 Aug 2019 05:53      PT/INR - ( 04 Aug 2019 08:14 )   PT: 11.9 sec;   INR: 1.05 ratio         PTT - ( 04 Aug 2019 08:14 )  PTT:24.3 sec            Assessment/Plan  89 year old male with a past medical history of abdominal surgery (right hemicolectomy, June 2019, bilateral inguinal hernia repair), CAD s/p CABG (>20 years ago), BPH, HTN and recent hospitalization for 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 found to have hernia with extensive diffuse adhesions with ischemic necrosis of small bowel, s/p open lysis of intestinal adhesions with small bowel resection.    - Straight cath with 360mL output  - Monitor urinary output  - RN to call with changes.      ADDENDUM:  Patient seen and examined at bedside at around 11:45pm 8/5/19.  Patient did not remember having the nurse straight catheterize him.  Not complaining of any pain or discomfort at this time.     VS stable  Physical exam: NTND suprapubic area    Plan:    Bladder scan at 3am.  Continue monitoring urinary output.   RN to call with changes.

## 2019-08-05 NOTE — PROGRESS NOTE ADULT - SUBJECTIVE AND OBJECTIVE BOX
Patient is a 89y old  Male who presents with a chief complaint of SBO (04 Aug 2019 13:44)        HPI:  89 year old male with a past medical history of abdominal surgery (right hemicolectomy, 2019, bilateral inguinal hernia repair), CAD s/p CABG (>20 years ago), BPH, HTN and recent hospitalization for 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 He reports that he gradually developed sharp, severe pain in his abdomen around 1:00PM this afternoon which remains unchanged. The pain is localized to his RUQ and epigastric/umbilical region. He denies radiation of pain, aggravating/alleviating factors. He has been nauseous with a minimal amount of vomiting. He has not passed gas or had a BM since the onset of symptoms. He denies a family history of GI cancer. He denies fevers, chills, recent weight loss, chest pain, shortness of breath, hematochezia, melena, dysuria, HA, blurry vision.    ED Course: Vital Signs: T(F): 97.8, HR: 70 - 85, BP: 184/94 - 209/95, RR: 16, SpO2: 96% - 98%. CT abdomen and pelvis revealed small bowel obstruction with multiple transition points in the central abdomen as well as mesenteric edema and ischemia in the right hemiabdomen. Given NS bolus and odansetron for nausea, and NG tube placed with 100 cc of green fluid decompressed from stomach. Labs notable for WBC count 6.58, lactate 1.2, Na 134, lipase 171, amylase 83. UA negative. EKG nsr. Hypertensive to 209/95, given labetalol and hydralazine. (03 Aug 2019 20:00)      SUBJECTIVE & OBJECTIVE: Pt seen and examined at bedside, no acute complaitns     PHYSICAL EXAM:  T(C): 36.5 (19 @ 07:26), Max: 37.6 (19 @ 20:06)  HR: 93 (19 @ 07:26) (93 - 104)  BP: 155/85 (19 @ 07:26) (133/75 - 155/85)  RR: 16 (19 @ 07:26) (15 - 16)  SpO2: 99% (19 @ 07:26) (92% - 99%)  Wt(kg): --   GENERAL: NAD, well-groomed, well-developed  HEAD:  Atraumatic, Normocephalic  EYES: EOMI, PERRLA, conjunctiva and sclera clear  ENMT: Moist mucous membranes  NECK: Supple, No JVD  NERVOUS SYSTEM:  Alert & Oriented X3,  CHEST/LUNG: decrease air entry at rani abses   HEART: Regular rate and rhythm; No murmurs, rubs, or gallops  ABDOMEN: Soft, Nontender, Nondistended; Bowel sounds present  EXTREMITIES:  2+ Peripheral Pulses, No clubbing, cyanosis, or edema        MEDICATIONS  (STANDING):  enoxaparin Injectable 40 milliGRAM(s) SubCutaneous daily  finasteride 5 milliGRAM(s) Oral daily  lactated ringers. 1000 milliLiter(s) (100 mL/Hr) IV Continuous <Continuous>  metoprolol succinate ER 50 milliGRAM(s) Oral daily  NIFEdipine XL 60 milliGRAM(s) Oral daily  tamsulosin 0.4 milliGRAM(s) Oral at bedtime    MEDICATIONS  (PRN):  acetaminophen   Tablet .. 650 milliGRAM(s) Oral every 6 hours PRN Mild Pain (1 - 3)  HYDROmorphone  Injectable 0.5 milliGRAM(s) IV Push every 4 hours PRN Severe Pain (7 - 10)  morphine  - Injectable 2 milliGRAM(s) IV Push every 4 hours PRN Moderate Pain (4 - 6)  morphine  - Injectable 1 milliGRAM(s) IV Push every 10 minutes PRN Severe Pain (7 - 10)  morphine  - Injectable 0.5 milliGRAM(s) IV Push every 10 minutes PRN Moderate Pain (4 - 6)  ondansetron Injectable 4 milliGRAM(s) IV Push every 6 hours PRN Nausea      LABS:                        9.1    8.83  )-----------( 173      ( 05 Aug 2019 05:53 )             26.4         137  |  103  |  23  ----------------------------<  104<H>  4.2   |  27  |  0.79    Ca    7.9<L>      05 Aug 2019 05:53    TPro  7.8  /  Alb  4.0  /  TBili  0.4  /  DBili  x   /  AST  17  /  ALT  36  /  AlkPhos  77  08-03    PT/INR - ( 04 Aug 2019 08:14 )   PT: 11.9 sec;   INR: 1.05 ratio         PTT - ( 04 Aug 2019 08:14 )  PTT:24.3 sec  Urinalysis Basic - ( 03 Aug 2019 18:24 )    Color: Yellow / Appearance: Clear / S.015 / pH: x  Gluc: x / Ketone: Negative  / Bili: Negative / Urobili: Negative   Blood: x / Protein: 25 mg/dL / Nitrite: Negative   Leuk Esterase: Negative / RBC: 0-2 /HPF / WBC 0-2   Sq Epi: x / Non Sq Epi: Negative / Bacteria: Negative        CAPILLARY BLOOD GLUCOSE          CAPILLARY BLOOD GLUCOSE        CAPILLARY BLOOD GLUCOSE                RECENT CULTURES:      RADIOLOGY & ADDITIONAL TESTS:                        DVT/GI ppx  Discussed with pt @ bedside

## 2019-08-05 NOTE — PROGRESS NOTE ADULT - SUBJECTIVE AND OBJECTIVE BOX
STATUS POST:  ex-lap, SBR and DIONE     POST OPERATIVE DAY #: 2    SUBJECTIVE:  Patient seen and examined at bedside.  No overnight events.  Patient with no new complaints at this time, currently NPO with NGT, admits to possibly having flatus and denies bowel movement.  Patient denies any fevers, chills, chest pain, shortness of breath, nausea, vomiting or diarrhea.    Vital Signs Last 24 Hrs  T(C): 36.5 (05 Aug 2019 07:26), Max: 37.6 (04 Aug 2019 20:06)  T(F): 97.7 (05 Aug 2019 07:), Max: 99.6 (04 Aug 2019 20:06)  HR: 93 (05 Aug 2019 07:) (93 - 104)  BP: 155/85 (05 Aug 2019 07:) (133/75 - 155/85)  BP(mean): --  RR: 16 (05 Aug 2019 07:) (15 - 16)  SpO2: 99% (05 Aug 2019 07:) (92% - 99%)    PHYSICAL EXAM:  GENERAL: No acute distress, well-developed  HEAD:  Atraumatic, Normocephalic  ABDOMEN: Soft, mildly-tender, non-distended; bowel sounds+  NEUROLOGY: A&O x 3, no focal deficits    I&O's Summary    04 Aug 2019 07:  -  05 Aug 2019 07:00  --------------------------------------------------------  IN: 2350 mL / OUT: 1050 mL / NET: 1300 mL    05 Aug 2019 07:  -  05 Aug 2019 11:59  --------------------------------------------------------  IN: 1000 mL / OUT: 350 mL / NET: 650 mL      I&O's Detail    04 Aug 2019 07:01  -  05 Aug 2019 07:00  --------------------------------------------------------  IN:    lactated ringers.: 1300 mL    lactated ringers.: 1000 mL    Solution: 50 mL  Total IN: 2350 mL    OUT:    Indwelling Catheter - Urethral: 1050 mL  Total OUT: 1050 mL    Total NET: 1300 mL      05 Aug 2019 07:01  -  05 Aug 2019 11:59  --------------------------------------------------------  IN:    lactated ringers.: 1000 mL  Total IN: 1000 mL    OUT:    Indwelling Catheter - Urethral: 350 mL  Total OUT: 350 mL    Total NET: 650 mL        MEDICATIONS  (STANDING):  enoxaparin Injectable 40 milliGRAM(s) SubCutaneous daily  finasteride 5 milliGRAM(s) Oral daily  lactated ringers. 1000 milliLiter(s) (100 mL/Hr) IV Continuous <Continuous>  metoprolol succinate ER 50 milliGRAM(s) Oral daily  NIFEdipine XL 60 milliGRAM(s) Oral daily  tamsulosin 0.4 milliGRAM(s) Oral at bedtime    MEDICATIONS  (PRN):  acetaminophen   Tablet .. 650 milliGRAM(s) Oral every 6 hours PRN Mild Pain (1 - 3)  HYDROmorphone  Injectable 0.5 milliGRAM(s) IV Push every 4 hours PRN Severe Pain (7 - 10)  morphine  - Injectable 2 milliGRAM(s) IV Push every 4 hours PRN Moderate Pain (4 - 6)  morphine  - Injectable 1 milliGRAM(s) IV Push every 10 minutes PRN Severe Pain (7 - 10)  morphine  - Injectable 0.5 milliGRAM(s) IV Push every 10 minutes PRN Moderate Pain (4 - 6)  ondansetron Injectable 4 milliGRAM(s) IV Push every 6 hours PRN Nausea    LABS:                        9.1    8.83  )-----------( 173      ( 05 Aug 2019 05:53 )             26.4     08-05    137  |  103  |  23  ----------------------------<  104<H>  4.2   |  27  |  0.79    Ca    7.9<L>      05 Aug 2019 05:53    TPro  7.8  /  Alb  4.0  /  TBili  0.4  /  DBili  x   /  AST  17  /  ALT  36  /  AlkPhos  77  08-03    PT/INR - ( 04 Aug 2019 08:14 )   PT: 11.9 sec;   INR: 1.05 ratio         PTT - ( 04 Aug 2019 08:14 )  PTT:24.3 sec  Urinalysis Basic - ( 03 Aug 2019 18:24 )    Color: Yellow / Appearance: Clear / S.015 / pH: x  Gluc: x / Ketone: Negative  / Bili: Negative / Urobili: Negative   Blood: x / Protein: 25 mg/dL / Nitrite: Negative   Leuk Esterase: Negative / RBC: 0-2 /HPF / WBC 0-2   Sq Epi: x / Non Sq Epi: Negative / Bacteria: Negative      ASSESSMENT    89y Male POD 2 s/p ex-lap, SBR and DIONE currently NPO     PLAN  - Discussed with Dr. Rooney  - NGT "fell out", plan was to clamp and check residuals, start clears, will check up on patient and reassess in PM  - dressing changed  - wade out  - PT eval  - incentive spirometer  - pain control  - NPO, IVF , poss CLD  - serial abdominal exams  - labs in am    Surgical Team Contact Information  Spectralink: Ext: [phone] or [phone]  Pager: [phone]

## 2019-08-05 NOTE — PHYSICAL THERAPY INITIAL EVALUATION ADULT - PERTINENT HX OF CURRENT PROBLEM, REHAB EVAL
89 year old male with a past medical history of abdominal surgery (right hemicolectomy, June 2019, bilateral inguinal hernia repair), CAD s/p CABG (>20 years ago), BPH, HTN and recent hospitalization for 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 He reports that he gradually developed sharp, severe pain in his abdomen around 1:00PM this afternoon which remains unchanged.

## 2019-08-06 LAB
ANION GAP SERPL CALC-SCNC: 7 MMOL/L — SIGNIFICANT CHANGE UP (ref 5–17)
BUN SERPL-MCNC: 19 MG/DL — SIGNIFICANT CHANGE UP (ref 7–23)
CALCIUM SERPL-MCNC: 8.2 MG/DL — LOW (ref 8.5–10.1)
CHLORIDE SERPL-SCNC: 102 MMOL/L — SIGNIFICANT CHANGE UP (ref 96–108)
CO2 SERPL-SCNC: 28 MMOL/L — SIGNIFICANT CHANGE UP (ref 22–31)
CREAT SERPL-MCNC: 0.65 MG/DL — SIGNIFICANT CHANGE UP (ref 0.5–1.3)
GLUCOSE SERPL-MCNC: 86 MG/DL — SIGNIFICANT CHANGE UP (ref 70–99)
HCT VFR BLD CALC: 26 % — LOW (ref 39–50)
HGB BLD-MCNC: 8.7 G/DL — LOW (ref 13–17)
MCHC RBC-ENTMCNC: 31.3 PG — SIGNIFICANT CHANGE UP (ref 27–34)
MCHC RBC-ENTMCNC: 33.5 GM/DL — SIGNIFICANT CHANGE UP (ref 32–36)
MCV RBC AUTO: 93.5 FL — SIGNIFICANT CHANGE UP (ref 80–100)
NRBC # BLD: 0 /100 WBCS — SIGNIFICANT CHANGE UP (ref 0–0)
PLATELET # BLD AUTO: 165 K/UL — SIGNIFICANT CHANGE UP (ref 150–400)
POTASSIUM SERPL-MCNC: 4.1 MMOL/L — SIGNIFICANT CHANGE UP (ref 3.5–5.3)
POTASSIUM SERPL-SCNC: 4.1 MMOL/L — SIGNIFICANT CHANGE UP (ref 3.5–5.3)
RBC # BLD: 2.78 M/UL — LOW (ref 4.2–5.8)
RBC # FLD: 15.9 % — HIGH (ref 10.3–14.5)
SODIUM SERPL-SCNC: 137 MMOL/L — SIGNIFICANT CHANGE UP (ref 135–145)
WBC # BLD: 6.31 K/UL — SIGNIFICANT CHANGE UP (ref 3.8–10.5)
WBC # FLD AUTO: 6.31 K/UL — SIGNIFICANT CHANGE UP (ref 3.8–10.5)

## 2019-08-06 PROCEDURE — 99024 POSTOP FOLLOW-UP VISIT: CPT

## 2019-08-06 PROCEDURE — 99232 SBSQ HOSP IP/OBS MODERATE 35: CPT

## 2019-08-06 RX ORDER — SODIUM CHLORIDE 9 MG/ML
1000 INJECTION, SOLUTION INTRAVENOUS
Refills: 0 | Status: DISCONTINUED | OUTPATIENT
Start: 2019-08-06 | End: 2019-08-07

## 2019-08-06 RX ADMIN — TAMSULOSIN HYDROCHLORIDE 0.4 MILLIGRAM(S): 0.4 CAPSULE ORAL at 21:07

## 2019-08-06 RX ADMIN — MORPHINE SULFATE 2 MILLIGRAM(S): 50 CAPSULE, EXTENDED RELEASE ORAL at 21:31

## 2019-08-06 RX ADMIN — SODIUM CHLORIDE 100 MILLILITER(S): 9 INJECTION, SOLUTION INTRAVENOUS at 05:51

## 2019-08-06 RX ADMIN — MORPHINE SULFATE 2 MILLIGRAM(S): 50 CAPSULE, EXTENDED RELEASE ORAL at 01:15

## 2019-08-06 RX ADMIN — ONDANSETRON 4 MILLIGRAM(S): 8 TABLET, FILM COATED ORAL at 22:34

## 2019-08-06 RX ADMIN — MORPHINE SULFATE 2 MILLIGRAM(S): 50 CAPSULE, EXTENDED RELEASE ORAL at 00:17

## 2019-08-06 RX ADMIN — ENOXAPARIN SODIUM 40 MILLIGRAM(S): 100 INJECTION SUBCUTANEOUS at 12:56

## 2019-08-06 RX ADMIN — MORPHINE SULFATE 2 MILLIGRAM(S): 50 CAPSULE, EXTENDED RELEASE ORAL at 21:12

## 2019-08-06 RX ADMIN — Medication 50 MILLIGRAM(S): at 05:48

## 2019-08-06 RX ADMIN — SODIUM CHLORIDE 75 MILLILITER(S): 9 INJECTION, SOLUTION INTRAVENOUS at 17:24

## 2019-08-06 RX ADMIN — Medication 60 MILLIGRAM(S): at 05:48

## 2019-08-06 RX ADMIN — FINASTERIDE 5 MILLIGRAM(S): 5 TABLET, FILM COATED ORAL at 12:56

## 2019-08-06 NOTE — GOALS OF CARE CONVERSATION - PERSONAL ADVANCE DIRECTIVE - CONVERSATION DETAILS
met pt, recalls speaking to PC RN last hospitalization, pt has hcp at home, pt wife is hcp. PC RN contact # given.

## 2019-08-06 NOTE — DIETITIAN INITIAL EVALUATION ADULT. - PROBLEM SELECTOR PLAN 5
- VTE prophylaxis after surgery  - Continue home colace    IMPROVE VTE Individual Risk Assessment  RISK                                                                Points  [  ] Previous VTE                                                  3  [  ] Thrombophilia                                               2  [  ] Lower limb paralysis                                      2        (unable to hold up >15 seconds)    [  ] Current Cancer                                              2         (within 6 months)  [  ] Immobilization > 24 hrs                                1  [  ] ICU/CCU stay > 24 hours                              1  [ x ] Age > 60                                                      1    IMPROVE VTE Score: 1    IMPROVE Score 0-1: Low Risk, No VTE prophylaxis required for most patients, encourage ambulation.

## 2019-08-06 NOTE — PROGRESS NOTE ADULT - SUBJECTIVE AND OBJECTIVE BOX
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 He reports that he gradually developed sharp, severe pain in his abdomen around 1:00PM this afternoon which remains unchanged. The pain is localized to his RUQ and epigastric/umbilical region. He denies radiation of pain, aggravating/alleviating factors. He has been nauseous with a minimal amount of vomiting. He has not passed gas or had a BM since the onset of symptoms. He denies a family history of GI cancer. He denies fevers, chills, recent weight loss, chest pain, shortness of breath, hematochezia, melena, dysuria, HA, blurry vision.    ED Course: Vital Signs: T(F): 97.8, HR: 70 - 85, BP: 184/94 - 209/95, RR: 16, SpO2: 96% - 98%. CT abdomen and pelvis revealed small bowel obstruction with multiple transition points in the central abdomen as well as mesenteric edema and ischemia in the right hemiabdomen. Given NS bolus and odansetron for nausea, and NG tube placed with 100 cc of green fluid decompressed from stomach. Labs notable for WBC count 6.58, lactate 1.2, Na 134, lipase 171, amylase 83. UA negative. EKG nsr. Hypertensive to 209/95, given labetalol and hydralazine. (03 Aug 2019 20:00)      SUBJECTIVE & OBJECTIVE: Pt seen and examined at bedside, complaining of difficulty voiding     PHYSICAL EXAM:  T(C): 36.7 (08-06-19 @ 08:16), Max: 36.9 (08-05-19 @ 21:50)  HR: 93 (08-06-19 @ 08:16) (93 - 109)  BP: 130/84 (08-06-19 @ 08:16) (103/65 - 134/75)  RR: 17 (08-06-19 @ 08:16) (16 - 17)  SpO2: 97% (08-06-19 @ 08:16) (91% - 100%)  Wt(kg): --   GENERAL: NAD, well-groomed, well-developed  HEAD:  Atraumatic, Normocephalic  EYES: EOMI, PERRLA, conjunctiva and sclera clear  ENMT: Moist mucous membranes  NECK: Supple, No JVD  NERVOUS SYSTEM:  Alert & Oriented X3,   CHEST/LUNG: Clear to auscultation bilaterally; No rales, rhonchi, wheezing, or rubs  HEART: Regular rate and rhythm; No murmurs, rubs, or gallops  ABDOMEN: Soft, Nontender, Nondistended; Bowel sounds present  EXTREMITIES:  2+ Peripheral Pulses, No clubbing, cyanosis, or edema        MEDICATIONS  (STANDING):  enoxaparin Injectable 40 milliGRAM(s) SubCutaneous daily  finasteride 5 milliGRAM(s) Oral daily  lactated ringers. 1000 milliLiter(s) (100 mL/Hr) IV Continuous <Continuous>  metoprolol succinate ER 50 milliGRAM(s) Oral daily  NIFEdipine XL 60 milliGRAM(s) Oral daily  tamsulosin 0.4 milliGRAM(s) Oral at bedtime    MEDICATIONS  (PRN):  acetaminophen   Tablet .. 650 milliGRAM(s) Oral every 6 hours PRN Mild Pain (1 - 3)  HYDROmorphone  Injectable 0.5 milliGRAM(s) IV Push every 4 hours PRN Severe Pain (7 - 10)  morphine  - Injectable 2 milliGRAM(s) IV Push every 4 hours PRN Moderate Pain (4 - 6)  morphine  - Injectable 1 milliGRAM(s) IV Push every 10 minutes PRN Severe Pain (7 - 10)  morphine  - Injectable 0.5 milliGRAM(s) IV Push every 10 minutes PRN Moderate Pain (4 - 6)  ondansetron Injectable 4 milliGRAM(s) IV Push every 6 hours PRN Nausea      LABS:                        8.7    6.31  )-----------( 165      ( 06 Aug 2019 07:53 )             26.0     08-06    137  |  102  |  19  ----------------------------<  86  4.1   |  28  |  0.65    Ca    8.2<L>      06 Aug 2019 07:53            CAPILLARY BLOOD GLUCOSE          CAPILLARY BLOOD GLUCOSE        CAPILLARY BLOOD GLUCOSE                RECENT CULTURES:      RADIOLOGY & ADDITIONAL TESTS:                        DVT/GI ppx  Discussed with pt @ bedside

## 2019-08-06 NOTE — DIETITIAN INITIAL EVALUATION ADULT. - OTHER INFO
patient reports with NKFA, states was eating better at home PTA no problems chewing swallowing. lives with wife. VASHTI diet followed PTA. recently here in hospital with~ 10# weight loss since last admit ~ 5 weeks ago. PTA appetite was improved per patient .

## 2019-08-06 NOTE — DIETITIAN INITIAL EVALUATION ADULT. - PROBLEM SELECTOR PLAN 2
Chronic, elevated in the ED, likely multifactorial- pt with significant pain and urinary retention.   - Given labetolol 10 mg IV x1 and hydralazine 10 mg x1 for SBP >200 on admission  - wade placed   - given 4mg morphine x 1 and .5mg dilaudid x 1 for pain control  - Will hold home metoprolol succinate 50 mg daily and nifedipine 60 mg daily (which he took this morning) for now while NPO  - Starting IV metoprolol tartrate 2.5 q6h with hold parameters will increase prn

## 2019-08-06 NOTE — DIETITIAN INITIAL EVALUATION ADULT. - PROBLEM SELECTOR PLAN 3
- urinary retention in ED. wade placed. will d/c when clinically improving for trial of void.  - Continue home meds finasteride 5 mg daily and tamsulosin 0.4 mg for BPH when no longer npo.  - Continue home tylenol PRN for pain related to prostate/urination

## 2019-08-06 NOTE — DIETITIAN INITIAL EVALUATION ADULT. - PROBLEM SELECTOR PLAN 1
- Hx of SBO 2/2 cecal volvulus requiring right hemicolectomy by Dr. Tse in June 2019  - NGT placed in ED  - CT showing SBO with mesenteric edema and ischemia  - WBC 6.58, lactate 1.2 , lactate trending up, repeat 2.2 suspicious for ischemic bowel  - Surgery (Dr Rooney) consulted. pt to go urgently to OR for exlap  - NPO for surgery  - Type and Screen  - IV Zofran 4 mg q6 PRN for nausea/vomiting  - pain control - tylenol mild, morphine 2mg q 4 moderate, dilaudid .5mg q4 severe pain.

## 2019-08-06 NOTE — PROGRESS NOTE ADULT - SUBJECTIVE AND OBJECTIVE BOX
STATUS POST:  ex-lap, SBR and DIONE     POST OPERATIVE DAY #: 3    SUBJECTIVE:  Patient seen and examined at bedside.  Pt with urinary retention overnight, ST cath'd 300 cc's.  Patient with complaint of hunger at this time, NPO, not sure if having flatus and denies bowel movement. Patient denies any fevers, chills, chest pain, shortness of breath, abdominal pain, nausea, vomiting or diarrhea. Denies feelings of urgency, fullness or suprapubic pressure.    Vital Signs Last 24 Hrs  T(C): 36.7 (06 Aug 2019 08:16), Max: 36.9 (05 Aug 2019 21:50)  T(F): 98.1 (06 Aug 2019 08:16), Max: 98.4 (05 Aug 2019 21:50)  HR: 93 (06 Aug 2019 08:16) (93 - 109)  BP: 130/84 (06 Aug 2019 08:16) (103/65 - 134/75)  BP(mean): --  RR: 17 (06 Aug 2019 08:16) (16 - 17)  SpO2: 97% (06 Aug 2019 08:16) (91% - 100%)    PHYSICAL EXAM:  GENERAL: No acute distress, well-developed  HEAD:  Atraumatic, Normocephalic  ABDOMEN: Soft, mildly-tender, mildly-distended; bowel sounds+  NEUROLOGY: A&O x 3, no focal deficits    I&O's Summary    05 Aug 2019 07:01  -  06 Aug 2019 07:00  --------------------------------------------------------  IN: 2000 mL / OUT: 700 mL / NET: 1300 mL    06 Aug 2019 07:01  -  06 Aug 2019 13:16  --------------------------------------------------------  IN: 0 mL / OUT: 500 mL / NET: -500 mL      I&O's Detail    05 Aug 2019 07:01  -  06 Aug 2019 07:00  --------------------------------------------------------  IN:    lactated ringers.: 2000 mL  Total IN: 2000 mL    OUT:    Indwelling Catheter - Urethral: 350 mL    Intermittent Catheterization - Urethral: 350 mL  Total OUT: 700 mL    Total NET: 1300 mL      06 Aug 2019 07:01  -  06 Aug 2019 13:16  --------------------------------------------------------  IN:  Total IN: 0 mL    OUT:    Indwelling Catheter - Urethral: 500 mL  Total OUT: 500 mL    Total NET: -500 mL        MEDICATIONS  (STANDING):  enoxaparin Injectable 40 milliGRAM(s) SubCutaneous daily  finasteride 5 milliGRAM(s) Oral daily  lactated ringers. 1000 milliLiter(s) (75 mL/Hr) IV Continuous <Continuous>  metoprolol succinate ER 50 milliGRAM(s) Oral daily  NIFEdipine XL 60 milliGRAM(s) Oral daily  tamsulosin 0.4 milliGRAM(s) Oral at bedtime    MEDICATIONS  (PRN):  acetaminophen   Tablet .. 650 milliGRAM(s) Oral every 6 hours PRN Mild Pain (1 - 3)  HYDROmorphone  Injectable 0.5 milliGRAM(s) IV Push every 4 hours PRN Severe Pain (7 - 10)  morphine  - Injectable 2 milliGRAM(s) IV Push every 4 hours PRN Moderate Pain (4 - 6)  morphine  - Injectable 1 milliGRAM(s) IV Push every 10 minutes PRN Severe Pain (7 - 10)  morphine  - Injectable 0.5 milliGRAM(s) IV Push every 10 minutes PRN Moderate Pain (4 - 6)  ondansetron Injectable 4 milliGRAM(s) IV Push every 6 hours PRN Nausea    LABS:                        8.7    6.31  )-----------( 165      ( 06 Aug 2019 07:53 )             26.0     08-06    137  |  102  |  19  ----------------------------<  86  4.1   |  28  |  0.65    Ca    8.2<L>      06 Aug 2019 07:53    RADIOLOGY & ADDITIONAL STUDIES:    ASSESSMENT    89y Male POD 3 s/p  ex-lap, SBR and DIONE     PLAN  - Discussed with Dr. Rooney  - mauro reinserted, will leave in for duration of hospital stay and will likely f/u as outpatient with urologist for removal  - pt started on CLD, continue IVF at 75ccs for now  - incentive spirometer  - pain control  - OOB  - serial abdominal exams  - labs in am    Surgical Team Contact Information  Spectralink: Ext: [phone] or [phone]  Pager: [phone]

## 2019-08-06 NOTE — DIETITIAN INITIAL EVALUATION ADULT. - ETIOLOGY
related to altered GI function multiple hospitalizations related to alteration in GI tract function structure

## 2019-08-07 LAB
ANION GAP SERPL CALC-SCNC: 9 MMOL/L — SIGNIFICANT CHANGE UP (ref 5–17)
BUN SERPL-MCNC: 19 MG/DL — SIGNIFICANT CHANGE UP (ref 7–23)
CALCIUM SERPL-MCNC: 8.4 MG/DL — LOW (ref 8.5–10.1)
CHLORIDE SERPL-SCNC: 99 MMOL/L — SIGNIFICANT CHANGE UP (ref 96–108)
CO2 SERPL-SCNC: 28 MMOL/L — SIGNIFICANT CHANGE UP (ref 22–31)
CREAT SERPL-MCNC: 0.59 MG/DL — SIGNIFICANT CHANGE UP (ref 0.5–1.3)
GLUCOSE SERPL-MCNC: 156 MG/DL — HIGH (ref 70–99)
HCT VFR BLD CALC: 27.4 % — LOW (ref 39–50)
HGB BLD-MCNC: 9.7 G/DL — LOW (ref 13–17)
MCHC RBC-ENTMCNC: 33.2 PG — SIGNIFICANT CHANGE UP (ref 27–34)
MCHC RBC-ENTMCNC: 35.4 GM/DL — SIGNIFICANT CHANGE UP (ref 32–36)
MCV RBC AUTO: 93.8 FL — SIGNIFICANT CHANGE UP (ref 80–100)
NRBC # BLD: 0 /100 WBCS — SIGNIFICANT CHANGE UP (ref 0–0)
PLATELET # BLD AUTO: 214 K/UL — SIGNIFICANT CHANGE UP (ref 150–400)
POTASSIUM SERPL-MCNC: 3.4 MMOL/L — LOW (ref 3.5–5.3)
POTASSIUM SERPL-SCNC: 3.4 MMOL/L — LOW (ref 3.5–5.3)
RBC # BLD: 2.92 M/UL — LOW (ref 4.2–5.8)
RBC # FLD: 15.6 % — HIGH (ref 10.3–14.5)
SODIUM SERPL-SCNC: 136 MMOL/L — SIGNIFICANT CHANGE UP (ref 135–145)
WBC # BLD: 5.2 K/UL — SIGNIFICANT CHANGE UP (ref 3.8–10.5)
WBC # FLD AUTO: 5.2 K/UL — SIGNIFICANT CHANGE UP (ref 3.8–10.5)

## 2019-08-07 PROCEDURE — 99024 POSTOP FOLLOW-UP VISIT: CPT

## 2019-08-07 PROCEDURE — 99233 SBSQ HOSP IP/OBS HIGH 50: CPT

## 2019-08-07 PROCEDURE — 74019 RADEX ABDOMEN 2 VIEWS: CPT | Mod: 26

## 2019-08-07 RX ORDER — POTASSIUM CHLORIDE 20 MEQ
10 PACKET (EA) ORAL
Refills: 0 | Status: COMPLETED | OUTPATIENT
Start: 2019-08-07 | End: 2019-08-07

## 2019-08-07 RX ORDER — SODIUM CHLORIDE 9 MG/ML
1000 INJECTION, SOLUTION INTRAVENOUS
Refills: 0 | Status: DISCONTINUED | OUTPATIENT
Start: 2019-08-07 | End: 2019-08-08

## 2019-08-07 RX ORDER — METOCLOPRAMIDE HCL 10 MG
10 TABLET ORAL EVERY 4 HOURS
Refills: 0 | Status: COMPLETED | OUTPATIENT
Start: 2019-08-07 | End: 2019-08-08

## 2019-08-07 RX ORDER — METOCLOPRAMIDE HCL 10 MG
10 TABLET ORAL EVERY 6 HOURS
Refills: 0 | Status: DISCONTINUED | OUTPATIENT
Start: 2019-08-07 | End: 2019-08-07

## 2019-08-07 RX ORDER — POTASSIUM CHLORIDE 20 MEQ
40 PACKET (EA) ORAL ONCE
Refills: 0 | Status: COMPLETED | OUTPATIENT
Start: 2019-08-07 | End: 2019-08-07

## 2019-08-07 RX ADMIN — Medication 10 MILLIGRAM(S): at 09:53

## 2019-08-07 RX ADMIN — Medication 40 MILLIEQUIVALENT(S): at 12:15

## 2019-08-07 RX ADMIN — Medication 50 MILLIGRAM(S): at 05:48

## 2019-08-07 RX ADMIN — FINASTERIDE 5 MILLIGRAM(S): 5 TABLET, FILM COATED ORAL at 12:15

## 2019-08-07 RX ADMIN — TAMSULOSIN HYDROCHLORIDE 0.4 MILLIGRAM(S): 0.4 CAPSULE ORAL at 21:17

## 2019-08-07 RX ADMIN — Medication 10 MILLIGRAM(S): at 21:17

## 2019-08-07 RX ADMIN — SODIUM CHLORIDE 100 MILLILITER(S): 9 INJECTION, SOLUTION INTRAVENOUS at 18:35

## 2019-08-07 RX ADMIN — Medication 100 MILLIEQUIVALENT(S): at 12:21

## 2019-08-07 RX ADMIN — Medication 60 MILLIGRAM(S): at 05:48

## 2019-08-07 RX ADMIN — MORPHINE SULFATE 2 MILLIGRAM(S): 50 CAPSULE, EXTENDED RELEASE ORAL at 20:33

## 2019-08-07 RX ADMIN — ENOXAPARIN SODIUM 40 MILLIGRAM(S): 100 INJECTION SUBCUTANEOUS at 12:19

## 2019-08-07 RX ADMIN — SODIUM CHLORIDE 75 MILLILITER(S): 9 INJECTION, SOLUTION INTRAVENOUS at 06:45

## 2019-08-07 RX ADMIN — Medication 10 MILLIGRAM(S): at 14:44

## 2019-08-07 RX ADMIN — MORPHINE SULFATE 2 MILLIGRAM(S): 50 CAPSULE, EXTENDED RELEASE ORAL at 21:00

## 2019-08-07 RX ADMIN — Medication 10 MILLIGRAM(S): at 18:32

## 2019-08-07 NOTE — PROGRESS NOTE ADULT - SUBJECTIVE AND OBJECTIVE BOX
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 He reports that he gradually developed sharp, severe pain in his abdomen around 1:00PM this afternoon which remains unchanged. The pain is localized to his RUQ and epigastric/umbilical region. He denies radiation of pain, aggravating/alleviating factors. He has been nauseous with a minimal amount of vomiting. He has not passed gas or had a BM since the onset of symptoms. He denies a family history of GI cancer. He denies fevers, chills, recent weight loss, chest pain, shortness of breath, hematochezia, melena, dysuria, HA, blurry vision.    ED Course: Vital Signs: T(F): 97.8, HR: 70 - 85, BP: 184/94 - 209/95, RR: 16, SpO2: 96% - 98%. CT abdomen and pelvis revealed small bowel obstruction with multiple transition points in the central abdomen as well as mesenteric edema and ischemia in the right hemiabdomen. Given NS bolus and odansetron for nausea, and NG tube placed with 100 cc of green fluid decompressed from stomach. Labs notable for WBC count 6.58, lactate 1.2, Na 134, lipase 171, amylase 83. UA negative. EKG nsr. Hypertensive to 209/95, given labetalol and hydralazine. (03 Aug 2019 20:00)      SUBJECTIVE & OBJECTIVE: Pt seen and examined at bedside, seen ambulating with PT.  pain is controlled with pain meds, has mild nausea. Had BM and tolerating po diet.    PHYSICAL EXAM:  Vital Signs Last 24 Hrs  T(C): 36.6 (07 Aug 2019 07:23), Max: 36.8 (06 Aug 2019 15:38)  T(F): 97.9 (07 Aug 2019 07:23), Max: 98.3 (06 Aug 2019 23:23)  HR: 103 (07 Aug 2019 07:23) (94 - 104)  BP: 152/80 (07 Aug 2019 07:23) (99/62 - 152/80)  BP(mean): --  RR: 16 (07 Aug 2019 07:23) (16 - 18)  SpO2: 92% (07 Aug 2019 07:23) (92% - 96%)  Wt(kg): --     GENERAL: NAD, well-groomed, well-developed, morbidly obese  HEAD:  Atraumatic, Normocephalic  EYES: EOMI, PERRLA, conjunctiva and sclera clear  ENMT: Moist mucous membranes  NECK: Supple, No JVD  NERVOUS SYSTEM:  Alert & Oriented X3,   CHEST/LUNG: Clear to auscultation bilaterally; No rales, rhonchi, wheezing, or rubs  HEART: Regular rate and rhythm; No murmurs, rubs, or gallops  ABDOMEN: Soft, Nontender, Nondistended; Bowel sounds present  EXTREMITIES:  2+ Peripheral Pulses, No clubbing, cyanosis, or edema        MEDICATIONS  (STANDING):  enoxaparin Injectable 40 milliGRAM(s) SubCutaneous daily  finasteride 5 milliGRAM(s) Oral daily  lactated ringers. 1000 milliLiter(s) (100 mL/Hr) IV Continuous <Continuous>  metoprolol succinate ER 50 milliGRAM(s) Oral daily  NIFEdipine XL 60 milliGRAM(s) Oral daily  tamsulosin 0.4 milliGRAM(s) Oral at bedtime    MEDICATIONS  (PRN):  acetaminophen   Tablet .. 650 milliGRAM(s) Oral every 6 hours PRN Mild Pain (1 - 3)  HYDROmorphone  Injectable 0.5 milliGRAM(s) IV Push every 4 hours PRN Severe Pain (7 - 10)  morphine  - Injectable 2 milliGRAM(s) IV Push every 4 hours PRN Moderate Pain (4 - 6)  morphine  - Injectable 1 milliGRAM(s) IV Push every 10 minutes PRN Severe Pain (7 - 10)  morphine  - Injectable 0.5 milliGRAM(s) IV Push every 10 minutes PRN Moderate Pain (4 - 6)  ondansetron Injectable 4 milliGRAM(s) IV Push every 6 hours PRN Nausea      LABS:                        9.7    5.20  )-----------( 214      ( 07 Aug 2019 06:58 )             27.4     07 Aug 2019 06:58    136    |  99     |  19     ----------------------------<  156    3.4     |  28     |  0.59     Ca    8.4        07 Aug 2019 06:58          CAPILLARY BLOOD GLUCOSE        UCx       RADIOLOGY & ADDITIONAL TESTS:                  RECENT CULTURES:      RADIOLOGY & ADDITIONAL TESTS:                        DVT/GI ppx  Discussed with pt @ bedside

## 2019-08-07 NOTE — PROGRESS NOTE ADULT - SUBJECTIVE AND OBJECTIVE BOX
STATUS POST:  ex-lap, SBR and DIONE     POST OPERATIVE DAY #: 4    SUBJECTIVE:  Patient seen and examined at bedside.  No overnight events.  Patient with new complaint of nausea at this time, tolerating diet, admits to flatus and bowel movement.  Patient denies any fevers, chills, chest pain, shortness of breath, abdominal pain, nausea, vomiting or diarrhea.    Vital Signs Last 24 Hrs  T(C): 36.6 (07 Aug 2019 07:23), Max: 36.8 (06 Aug 2019 15:38)  T(F): 97.9 (07 Aug 2019 07:23), Max: 98.3 (06 Aug 2019 23:23)  HR: 103 (07 Aug 2019 07:23) (94 - 104)  BP: 152/80 (07 Aug 2019 07:23) (99/62 - 152/80)  BP(mean): --  RR: 16 (07 Aug 2019 07:23) (16 - 18)  SpO2: 92% (07 Aug 2019 07:23) (92% - 96%)    PHYSICAL EXAM:  GENERAL: No acute distress, well-developed  HEAD:  Atraumatic, Normocephalic  CHEST/LUNG: CTAB; No wheezes, rales, or rhonchi  HEART: Regular rate and rhythm; No murmurs, rubs, or gallops  ABDOMEN: Soft, non-tender, non-distended; bowel sounds+  NEUROLOGY: A&O x 3, no focal deficits    I&O's Summary    06 Aug 2019 07:01  -  07 Aug 2019 07:00  --------------------------------------------------------  IN: 1900 mL / OUT: 900 mL / NET: 1000 mL      I&O's Detail    06 Aug 2019 07:01  -  07 Aug 2019 07:00  --------------------------------------------------------  IN:    lactated ringers.: 1900 mL  Total IN: 1900 mL    OUT:    Indwelling Catheter - Urethral: 900 mL  Total OUT: 900 mL    Total NET: 1000 mL        MEDICATIONS  (STANDING):  enoxaparin Injectable 40 milliGRAM(s) SubCutaneous daily  finasteride 5 milliGRAM(s) Oral daily  lactated ringers. 1000 milliLiter(s) (75 mL/Hr) IV Continuous <Continuous>  metoclopramide Injectable 10 milliGRAM(s) IV Push every 4 hours  metoprolol succinate ER 50 milliGRAM(s) Oral daily  NIFEdipine XL 60 milliGRAM(s) Oral daily  tamsulosin 0.4 milliGRAM(s) Oral at bedtime    MEDICATIONS  (PRN):  acetaminophen   Tablet .. 650 milliGRAM(s) Oral every 6 hours PRN Mild Pain (1 - 3)  HYDROmorphone  Injectable 0.5 milliGRAM(s) IV Push every 4 hours PRN Severe Pain (7 - 10)  morphine  - Injectable 2 milliGRAM(s) IV Push every 4 hours PRN Moderate Pain (4 - 6)  morphine  - Injectable 1 milliGRAM(s) IV Push every 10 minutes PRN Severe Pain (7 - 10)  morphine  - Injectable 0.5 milliGRAM(s) IV Push every 10 minutes PRN Moderate Pain (4 - 6)  ondansetron Injectable 4 milliGRAM(s) IV Push every 6 hours PRN Nausea    LABS:                        9.7    5.20  )-----------( 214      ( 07 Aug 2019 06:58 )             27.4     08-07    136  |  99  |  19  ----------------------------<  156<H>  3.4<L>   |  28  |  0.59    Ca    8.4<L>      07 Aug 2019 06:58          RADIOLOGY & ADDITIONAL STUDIES:    ASSESSMENT    89y Male    PLAN  - Will discuss with    - incentive spirometer  - pain control  - OOB  - NPO, IVF   - serial abdominal exams  - labs in am    Surgical Team Contact Information  Spectralink: Ext: [phone] or [phone]  Pager: [phone] STATUS POST:  ex-lap, SBR and DIONE     POST OPERATIVE DAY #: 4    SUBJECTIVE:  Patient seen and examined at bedside.  No overnight events.  Patient with new complaint of nausea at this time, tolerating diet, denies flatus and/or bowel movement.  Patient denies any fevers, chills, chest pain, shortness of breath, vomiting or diarrhea.    Vital Signs Last 24 Hrs  T(C): 36.6 (07 Aug 2019 07:23), Max: 36.8 (06 Aug 2019 15:38)  T(F): 97.9 (07 Aug 2019 07:23), Max: 98.3 (06 Aug 2019 23:23)  HR: 103 (07 Aug 2019 07:23) (94 - 104)  BP: 152/80 (07 Aug 2019 07:23) (99/62 - 152/80)  BP(mean): --  RR: 16 (07 Aug 2019 07:23) (16 - 18)  SpO2: 92% (07 Aug 2019 07:23) (92% - 96%)    PHYSICAL EXAM:  GENERAL: No acute distress, well-developed  HEAD:  Atraumatic, Normocephalic  ABDOMEN: Soft, mildly-tender, mildly-distended; bowel sounds+  NEUROLOGY: A&O x 3, no focal deficits    I&O's Summary    06 Aug 2019 07:01  -  07 Aug 2019 07:00  --------------------------------------------------------  IN: 1900 mL / OUT: 900 mL / NET: 1000 mL      I&O's Detail    06 Aug 2019 07:01  -  07 Aug 2019 07:00  --------------------------------------------------------  IN:    lactated ringers.: 1900 mL  Total IN: 1900 mL    OUT:    Indwelling Catheter - Urethral: 900 mL  Total OUT: 900 mL    Total NET: 1000 mL        MEDICATIONS  (STANDING):  enoxaparin Injectable 40 milliGRAM(s) SubCutaneous daily  finasteride 5 milliGRAM(s) Oral daily  lactated ringers. 1000 milliLiter(s) (75 mL/Hr) IV Continuous <Continuous>  metoclopramide Injectable 10 milliGRAM(s) IV Push every 4 hours  metoprolol succinate ER 50 milliGRAM(s) Oral daily  NIFEdipine XL 60 milliGRAM(s) Oral daily  tamsulosin 0.4 milliGRAM(s) Oral at bedtime    MEDICATIONS  (PRN):  acetaminophen   Tablet .. 650 milliGRAM(s) Oral every 6 hours PRN Mild Pain (1 - 3)  HYDROmorphone  Injectable 0.5 milliGRAM(s) IV Push every 4 hours PRN Severe Pain (7 - 10)  morphine  - Injectable 2 milliGRAM(s) IV Push every 4 hours PRN Moderate Pain (4 - 6)  morphine  - Injectable 1 milliGRAM(s) IV Push every 10 minutes PRN Severe Pain (7 - 10)  morphine  - Injectable 0.5 milliGRAM(s) IV Push every 10 minutes PRN Moderate Pain (4 - 6)  ondansetron Injectable 4 milliGRAM(s) IV Push every 6 hours PRN Nausea    LABS:                        9.7    5.20  )-----------( 214      ( 07 Aug 2019 06:58 )             27.4     08-07    136  |  99  |  19  ----------------------------<  156<H>  3.4<L>   |  28  |  0.59    Ca    8.4<L>      07 Aug 2019 06:58      RADIOLOGY & ADDITIONAL STUDIES:      ASSESSMENT    89y Male POD 4 s/p  ex-lap, SBR and DIONE currently with nausea and likely post-op ileus    PLAN  - Discussed with Dr. Rooney  - AXR for poss ileus, added reglan for promotility and nausea  - Repleated potassium of 3.4 through IV  - incentive spirometer  - pain control  - OOB  - NPO, IVF at 100cc/hr  - serial abdominal exams  - labs in am    Surgical Team Contact Information  Spectralink: Ext: [phone] or [phone]  Pager: [phone]

## 2019-08-08 LAB
ALBUMIN SERPL ELPH-MCNC: 2.4 G/DL — LOW (ref 3.3–5)
ALP SERPL-CCNC: 58 U/L — SIGNIFICANT CHANGE UP (ref 40–120)
ALT FLD-CCNC: 26 U/L — SIGNIFICANT CHANGE UP (ref 12–78)
ANION GAP SERPL CALC-SCNC: 13 MMOL/L — SIGNIFICANT CHANGE UP (ref 5–17)
ANION GAP SERPL CALC-SCNC: 9 MMOL/L — SIGNIFICANT CHANGE UP (ref 5–17)
AST SERPL-CCNC: 134 U/L — HIGH (ref 15–37)
BASE EXCESS BLDA CALC-SCNC: 2 MMOL/L — SIGNIFICANT CHANGE UP (ref -2–2)
BASOPHILS # BLD AUTO: 0 K/UL — SIGNIFICANT CHANGE UP (ref 0–0.2)
BASOPHILS NFR BLD AUTO: 0 % — SIGNIFICANT CHANGE UP (ref 0–2)
BILIRUB SERPL-MCNC: 0.5 MG/DL — SIGNIFICANT CHANGE UP (ref 0.2–1.2)
BLOOD GAS COMMENTS ARTERIAL: SIGNIFICANT CHANGE UP
BUN SERPL-MCNC: 19 MG/DL — SIGNIFICANT CHANGE UP (ref 7–23)
BUN SERPL-MCNC: 23 MG/DL — SIGNIFICANT CHANGE UP (ref 7–23)
CALCIUM SERPL-MCNC: 8.2 MG/DL — LOW (ref 8.5–10.1)
CALCIUM SERPL-MCNC: 8.4 MG/DL — LOW (ref 8.5–10.1)
CHLORIDE SERPL-SCNC: 100 MMOL/L — SIGNIFICANT CHANGE UP (ref 96–108)
CHLORIDE SERPL-SCNC: 100 MMOL/L — SIGNIFICANT CHANGE UP (ref 96–108)
CO2 SERPL-SCNC: 22 MMOL/L — SIGNIFICANT CHANGE UP (ref 22–31)
CO2 SERPL-SCNC: 29 MMOL/L — SIGNIFICANT CHANGE UP (ref 22–31)
CREAT SERPL-MCNC: 0.77 MG/DL — SIGNIFICANT CHANGE UP (ref 0.5–1.3)
CREAT SERPL-MCNC: 1.2 MG/DL — SIGNIFICANT CHANGE UP (ref 0.5–1.3)
EOSINOPHIL # BLD AUTO: 0 K/UL — SIGNIFICANT CHANGE UP (ref 0–0.5)
EOSINOPHIL NFR BLD AUTO: 0 % — SIGNIFICANT CHANGE UP (ref 0–6)
GLUCOSE SERPL-MCNC: 120 MG/DL — HIGH (ref 70–99)
GLUCOSE SERPL-MCNC: 133 MG/DL — HIGH (ref 70–99)
HCO3 BLDA-SCNC: 27 MMOL/L — SIGNIFICANT CHANGE UP (ref 23–27)
HCT VFR BLD CALC: 27.4 % — LOW (ref 39–50)
HCT VFR BLD CALC: 30.9 % — LOW (ref 39–50)
HGB BLD-MCNC: 10.6 G/DL — LOW (ref 13–17)
HGB BLD-MCNC: 9.5 G/DL — LOW (ref 13–17)
HOROWITZ INDEX BLDA+IHG-RTO: SIGNIFICANT CHANGE UP
LACTATE SERPL-SCNC: 6.7 MMOL/L — CRITICAL HIGH (ref 0.7–2)
LYMPHOCYTES # BLD AUTO: 0.58 K/UL — LOW (ref 1–3.3)
LYMPHOCYTES # BLD AUTO: 8 % — LOW (ref 13–44)
MAGNESIUM SERPL-MCNC: 1.9 MG/DL — SIGNIFICANT CHANGE UP (ref 1.6–2.6)
MCHC RBC-ENTMCNC: 31.8 PG — SIGNIFICANT CHANGE UP (ref 27–34)
MCHC RBC-ENTMCNC: 32 PG — SIGNIFICANT CHANGE UP (ref 27–34)
MCHC RBC-ENTMCNC: 34.3 GM/DL — SIGNIFICANT CHANGE UP (ref 32–36)
MCHC RBC-ENTMCNC: 34.7 GM/DL — SIGNIFICANT CHANGE UP (ref 32–36)
MCV RBC AUTO: 92.3 FL — SIGNIFICANT CHANGE UP (ref 80–100)
MCV RBC AUTO: 92.8 FL — SIGNIFICANT CHANGE UP (ref 80–100)
MONOCYTES # BLD AUTO: 0.22 K/UL — SIGNIFICANT CHANGE UP (ref 0–0.9)
MONOCYTES NFR BLD AUTO: 3 % — SIGNIFICANT CHANGE UP (ref 2–14)
NEUTROPHILS # BLD AUTO: 6.32 K/UL — SIGNIFICANT CHANGE UP (ref 1.8–7.4)
NEUTROPHILS NFR BLD AUTO: 77 % — SIGNIFICANT CHANGE UP (ref 43–77)
NRBC # BLD: 0 /100 WBCS — SIGNIFICANT CHANGE UP (ref 0–0)
NRBC # BLD: SIGNIFICANT CHANGE UP /100 WBCS (ref 0–0)
PCO2 BLDA: 34 MMHG — SIGNIFICANT CHANGE UP (ref 32–46)
PH BLDA: 7.49 — HIGH (ref 7.35–7.45)
PHOSPHATE SERPL-MCNC: 3.1 MG/DL — SIGNIFICANT CHANGE UP (ref 2.5–4.5)
PLATELET # BLD AUTO: 248 K/UL — SIGNIFICANT CHANGE UP (ref 150–400)
PLATELET # BLD AUTO: 304 K/UL — SIGNIFICANT CHANGE UP (ref 150–400)
PO2 BLDA: 102 MMHG — SIGNIFICANT CHANGE UP (ref 74–108)
POTASSIUM SERPL-MCNC: 3.7 MMOL/L — SIGNIFICANT CHANGE UP (ref 3.5–5.3)
POTASSIUM SERPL-MCNC: 4.4 MMOL/L — SIGNIFICANT CHANGE UP (ref 3.5–5.3)
POTASSIUM SERPL-SCNC: 3.7 MMOL/L — SIGNIFICANT CHANGE UP (ref 3.5–5.3)
POTASSIUM SERPL-SCNC: 4.4 MMOL/L — SIGNIFICANT CHANGE UP (ref 3.5–5.3)
PROT SERPL-MCNC: 6.1 G/DL — SIGNIFICANT CHANGE UP (ref 6–8.3)
RBC # BLD: 2.97 M/UL — LOW (ref 4.2–5.8)
RBC # BLD: 3.33 M/UL — LOW (ref 4.2–5.8)
RBC # FLD: 15.7 % — HIGH (ref 10.3–14.5)
RBC # FLD: 15.7 % — HIGH (ref 10.3–14.5)
SAO2 % BLDA: 98 % — HIGH (ref 92–96)
SODIUM SERPL-SCNC: 135 MMOL/L — SIGNIFICANT CHANGE UP (ref 135–145)
SODIUM SERPL-SCNC: 138 MMOL/L — SIGNIFICANT CHANGE UP (ref 135–145)
WBC # BLD: 6.66 K/UL — SIGNIFICANT CHANGE UP (ref 3.8–10.5)
WBC # BLD: 7.26 K/UL — SIGNIFICANT CHANGE UP (ref 3.8–10.5)
WBC # FLD AUTO: 6.66 K/UL — SIGNIFICANT CHANGE UP (ref 3.8–10.5)
WBC # FLD AUTO: 7.26 K/UL — SIGNIFICANT CHANGE UP (ref 3.8–10.5)

## 2019-08-08 PROCEDURE — 93306 TTE W/DOPPLER COMPLETE: CPT | Mod: 26

## 2019-08-08 PROCEDURE — 99024 POSTOP FOLLOW-UP VISIT: CPT

## 2019-08-08 PROCEDURE — 71275 CT ANGIOGRAPHY CHEST: CPT | Mod: 26

## 2019-08-08 PROCEDURE — 99291 CRITICAL CARE FIRST HOUR: CPT

## 2019-08-08 PROCEDURE — 74177 CT ABD & PELVIS W/CONTRAST: CPT | Mod: 26

## 2019-08-08 PROCEDURE — 70450 CT HEAD/BRAIN W/O DYE: CPT | Mod: 26

## 2019-08-08 PROCEDURE — 99232 SBSQ HOSP IP/OBS MODERATE 35: CPT

## 2019-08-08 PROCEDURE — 93010 ELECTROCARDIOGRAM REPORT: CPT

## 2019-08-08 RX ORDER — ASPIRIN/CALCIUM CARB/MAGNESIUM 324 MG
81 TABLET ORAL DAILY
Refills: 0 | Status: DISCONTINUED | OUTPATIENT
Start: 2019-08-09 | End: 2019-08-10

## 2019-08-08 RX ORDER — ASPIRIN/CALCIUM CARB/MAGNESIUM 324 MG
325 TABLET ORAL ONCE
Refills: 0 | Status: COMPLETED | OUTPATIENT
Start: 2019-08-08 | End: 2019-08-08

## 2019-08-08 RX ORDER — SODIUM CHLORIDE 9 MG/ML
500 INJECTION, SOLUTION INTRAVENOUS ONCE
Refills: 0 | Status: COMPLETED | OUTPATIENT
Start: 2019-08-08 | End: 2019-08-08

## 2019-08-08 RX ORDER — HEPARIN SODIUM 5000 [USP'U]/ML
3800 INJECTION INTRAVENOUS; SUBCUTANEOUS EVERY 6 HOURS
Refills: 0 | Status: DISCONTINUED | OUTPATIENT
Start: 2019-08-08 | End: 2019-08-10

## 2019-08-08 RX ORDER — LIDOCAINE 4 G/100G
1 CREAM TOPICAL ONCE
Refills: 0 | Status: COMPLETED | OUTPATIENT
Start: 2019-08-08 | End: 2019-08-08

## 2019-08-08 RX ORDER — SODIUM CHLORIDE 9 MG/ML
250 INJECTION, SOLUTION INTRAVENOUS ONCE
Refills: 0 | Status: COMPLETED | OUTPATIENT
Start: 2019-08-08 | End: 2019-08-08

## 2019-08-08 RX ORDER — METOCLOPRAMIDE HCL 10 MG
10 TABLET ORAL EVERY 4 HOURS
Refills: 0 | Status: DISCONTINUED | OUTPATIENT
Start: 2019-08-08 | End: 2019-08-09

## 2019-08-08 RX ORDER — HEPARIN SODIUM 5000 [USP'U]/ML
INJECTION INTRAVENOUS; SUBCUTANEOUS
Qty: 25000 | Refills: 0 | Status: DISCONTINUED | OUTPATIENT
Start: 2019-08-08 | End: 2019-08-10

## 2019-08-08 RX ORDER — SODIUM CHLORIDE 9 MG/ML
1000 INJECTION, SOLUTION INTRAVENOUS
Refills: 0 | Status: DISCONTINUED | OUTPATIENT
Start: 2019-08-08 | End: 2019-08-09

## 2019-08-08 RX ADMIN — MORPHINE SULFATE 2 MILLIGRAM(S): 50 CAPSULE, EXTENDED RELEASE ORAL at 13:03

## 2019-08-08 RX ADMIN — MORPHINE SULFATE 2 MILLIGRAM(S): 50 CAPSULE, EXTENDED RELEASE ORAL at 05:11

## 2019-08-08 RX ADMIN — MORPHINE SULFATE 2 MILLIGRAM(S): 50 CAPSULE, EXTENDED RELEASE ORAL at 20:04

## 2019-08-08 RX ADMIN — MORPHINE SULFATE 2 MILLIGRAM(S): 50 CAPSULE, EXTENDED RELEASE ORAL at 13:19

## 2019-08-08 RX ADMIN — LIDOCAINE 1 APPLICATION(S): 4 CREAM TOPICAL at 03:43

## 2019-08-08 RX ADMIN — Medication 10 MILLIGRAM(S): at 06:46

## 2019-08-08 RX ADMIN — SODIUM CHLORIDE 500 MILLILITER(S): 9 INJECTION, SOLUTION INTRAVENOUS at 17:15

## 2019-08-08 RX ADMIN — Medication 10 MILLIGRAM(S): at 02:22

## 2019-08-08 RX ADMIN — Medication 10 MILLIGRAM(S): at 22:42

## 2019-08-08 RX ADMIN — Medication 50 MILLIGRAM(S): at 05:20

## 2019-08-08 RX ADMIN — MORPHINE SULFATE 2 MILLIGRAM(S): 50 CAPSULE, EXTENDED RELEASE ORAL at 05:41

## 2019-08-08 RX ADMIN — SODIUM CHLORIDE 75 MILLILITER(S): 9 INJECTION, SOLUTION INTRAVENOUS at 15:39

## 2019-08-08 RX ADMIN — ENOXAPARIN SODIUM 40 MILLIGRAM(S): 100 INJECTION SUBCUTANEOUS at 12:14

## 2019-08-08 RX ADMIN — MORPHINE SULFATE 2 MILLIGRAM(S): 50 CAPSULE, EXTENDED RELEASE ORAL at 20:20

## 2019-08-08 RX ADMIN — TAMSULOSIN HYDROCHLORIDE 0.4 MILLIGRAM(S): 0.4 CAPSULE ORAL at 22:43

## 2019-08-08 RX ADMIN — Medication 60 MILLIGRAM(S): at 05:20

## 2019-08-08 RX ADMIN — SODIUM CHLORIDE 500 MILLILITER(S): 9 INJECTION, SOLUTION INTRAVENOUS at 23:00

## 2019-08-08 RX ADMIN — Medication 10 MILLIGRAM(S): at 12:14

## 2019-08-08 RX ADMIN — Medication 10 MILLIGRAM(S): at 15:39

## 2019-08-08 RX ADMIN — FINASTERIDE 5 MILLIGRAM(S): 5 TABLET, FILM COATED ORAL at 12:14

## 2019-08-08 NOTE — PROVIDER CONTACT NOTE (CHANGE IN STATUS NOTIFICATION) - NAME OF MD/NP/PA/DO NOTIFIED:
Dr. Betancourt (Resident); Dr. BRITNI Payne (Cardio), Cody Egan (NP Cardio) Dr. Betancourt (Resident)

## 2019-08-08 NOTE — PROGRESS NOTE ADULT - SUBJECTIVE AND OBJECTIVE BOX
STATUS POST:  ex-lap, SBR and DIONE     POST OPERATIVE DAY #: 5    SUBJECTIVE:  Patient seen and examined at bedside.  No overnight events.  Patient with complaint of mild nausea in AM that resolved with zofran, tolerating diet, admits to flatus, denies bowel movement. Currently with mild cough  Patient denies any fevers, chills, chest pain, shortness of breath, vomiting or diarrhea.    Vital Signs Last 24 Hrs  T(C): 36.8 (08 Aug 2019 07:20), Max: 37.7 (08 Aug 2019 00:18)  T(F): 98.2 (08 Aug 2019 07:20), Max: 99.9 (08 Aug 2019 00:18)  HR: 102 (08 Aug 2019 07:20) (102 - 116)  BP: 124/65 (08 Aug 2019 07:20) (102/65 - 148/89)  BP(mean): --  RR: 16 (08 Aug 2019 07:20) (16 - 17)  SpO2: 96% (08 Aug 2019 07:20) (93% - 97%)    PHYSICAL EXAM:  GENERAL: No acute distress, well-developed  HEAD:  Atraumatic, Normocephalic  CHEST/LUNG: decreased breath sound in lower lungs. No wheezes, rales, or rhonchi  HEART: Regular rate and rhythm; No murmurs, rubs, or gallops  ABDOMEN: Soft, mildly-tender, mildly-distended; hypoactiveBS  NEUROLOGY: A&O x 3, no focal deficits    I&O's Summary    07 Aug 2019 07:01  -  08 Aug 2019 07:00  --------------------------------------------------------  IN: 2420 mL / OUT: 1450 mL / NET: 970 mL    08 Aug 2019 07:01  -  08 Aug 2019 10:46  --------------------------------------------------------  IN: 300 mL / OUT: 0 mL / NET: 300 mL      I&O's Detail    07 Aug 2019 07:01  -  08 Aug 2019 07:00  --------------------------------------------------------  IN:    lactated ringers.: 300 mL    lactated ringers.: 2000 mL    Oral Fluid: 120 mL  Total IN: 2420 mL    OUT:    Indwelling Catheter - Urethral: 1450 mL  Total OUT: 1450 mL    Total NET: 970 mL      08 Aug 2019 07:01  -  08 Aug 2019 10:46  --------------------------------------------------------  IN:    lactated ringers.: 300 mL  Total IN: 300 mL    OUT:  Total OUT: 0 mL    Total NET: 300 mL        MEDICATIONS  (STANDING):  enoxaparin Injectable 40 milliGRAM(s) SubCutaneous daily  finasteride 5 milliGRAM(s) Oral daily  lactated ringers. 1000 milliLiter(s) (100 mL/Hr) IV Continuous <Continuous>  metoprolol succinate ER 50 milliGRAM(s) Oral daily  NIFEdipine XL 60 milliGRAM(s) Oral daily  tamsulosin 0.4 milliGRAM(s) Oral at bedtime    MEDICATIONS  (PRN):  acetaminophen   Tablet .. 650 milliGRAM(s) Oral every 6 hours PRN Mild Pain (1 - 3)  HYDROmorphone  Injectable 0.5 milliGRAM(s) IV Push every 4 hours PRN Severe Pain (7 - 10)  morphine  - Injectable 2 milliGRAM(s) IV Push every 4 hours PRN Moderate Pain (4 - 6)  morphine  - Injectable 1 milliGRAM(s) IV Push every 10 minutes PRN Severe Pain (7 - 10)  morphine  - Injectable 0.5 milliGRAM(s) IV Push every 10 minutes PRN Moderate Pain (4 - 6)  ondansetron Injectable 4 milliGRAM(s) IV Push every 6 hours PRN Nausea    LABS:                        9.5    6.66  )-----------( 248      ( 08 Aug 2019 05:59 )             27.4     08-08    138  |  100  |  19  ----------------------------<  120<H>  3.7   |  29  |  0.77    Ca    8.2<L>      08 Aug 2019 05:59      ASSESSMENT    89y Male POD 5 s/p  ex-lap, SBR and DIONE currently with mild nausea and post-op ileus, now passing flatus    PLAN  - Discussed with Dr. Rooney  - continue reglan for promotility and nausea  - incentive spirometer  - pain control  - OOB  - CLD, drop IVF to 75cc/hr  - serial abdominal exams  - labs in am    Surgical Team Contact Information  Spectralink: Ext: [phone] or [phone]  Pager: [phone]

## 2019-08-08 NOTE — PROVIDER CONTACT NOTE (CHANGE IN STATUS NOTIFICATION) - ACTION/TREATMENT ORDERED:
Rapid Response initiated. 250ml Bolus of LR administered. CT Scan of the Head. Pt. transferred to ICU for elevated cardiac enzymes.

## 2019-08-08 NOTE — CONSULT NOTE ADULT - ATTENDING COMMENTS
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  Today had an episode of presyncope/syncope earlier today, now found to have NSTEMI with rising lactic acidosis.  Unclear if NSTEMI is the primary event of if this is secondary to an underlying cause which is causing the lactic acidosis.      He is tachycardic with tachypnea and increased work of breathing.    Slight expiratory wheeze on L but otherwise clear lungs  abd is mildly distended, mild diffuse tenderness to palpation, moderate tenderness in RLQ, surgical site healing well    Bedside echo shows reduced LV function, A line predominant, with trace effusions b/l and a fluid filled stomach.      --check CTA c/a/p to eval for PE or abdominal source of lactic acidosis  --NSTEMI  trend cardiac enzymes and EKG, echo done tonight  has received BB earlier today  ASA and statin if able to take PO  no heparin gtt at this point given postop status  --tenuous respiratory status with mild hypoxemia, and increased work of breathing  currently on FM, possible high flow NC   --mild Cr bump, will hydrate to prevent SARA  --keep NPO for now pending CT  --check BCx, hold on Abx for now  --plan discussed with pt and wife, cardiology.  Dr. Rooney updated.
I saw and examined the patient personally. Spoke with above provider regarding this case. I reviewed the above findings completely.  I agree with the above history, physical, and plan which I have edited where appropriate.   Pt has a large NSTEMI. Unclear if this is the primary issue that is causing pts tachycardia or it is secondary to another underlying process.   Unable to AC 2/2 recent sx  check 2d echo  stat abg  get cta c/a/p  Pt to go to ICU

## 2019-08-08 NOTE — PROVIDER CONTACT NOTE (CHANGE IN STATUS NOTIFICATION) - ASSESSMENT
Pt. was being moved OOB to chair and possibly having syncope episode with Heart rate reported by Telemetry Tech in the 130s. Received call from lab for abnormal cardiac enzymes & lactate Pt. was being moved OOB to chair and possibly having syncope episode with Heart rate reported by Telemetry Tech of sinus tachycardia sustaining in the 130s and Shortness of breath. Received call from lab at 1800 for abnormal cardiac enzymes & lactate Pt. was being moved OOB to chair and change in mental status with Heart rate reported by Telemetry Tech of sinus tachycardia sustaining in the 130s and Shortness of breath. Received call from lab at 1800 for abnormal cardiac enzymes & lactate

## 2019-08-08 NOTE — CHART NOTE - NSCHARTNOTEFT_GEN_A_CORE
Resident Rapid Response Note    Rapid response was called at 17:00 for fall and change in mental status.     Events leading up to Rapid Response:    Patient was being transferred from chair to bed per nursing staff, and patient fell. Per patient, no LOC. No head trauma. Patient was in 130's per tele tech. Patient was seen and examined at the bedside by the rapid response team and resident medicine team. Dr. Tipton and ICU PA Cody present.     Rapid Response Vital Signs:  BP: 117/75  HR: 126  RR: 32  SpO2: 92% on 2L NC  Temp: 98.3  FS: 128      Physical Exam:  General: Well developed, well nourished, in no acute distress  HEENT: NCAT, PERRLA, EOMI bl, moist mucous membranes   Neurology: A&Ox3, nonfocal, CN II-XII grossly intact, sensation intact, UE 5/5 MMS and LE 5/5 MMS, L sided facial droop  Respiratory: CTA B/L, No wheezing, rales, or rhonchi  CV: RRR, S1/S2 present, no murmurs, rubs, or gallops  : Wade catheter in place, tea colored urine in wade bag, clear in wade tube  Abdominal: Soft, diffuse tenderness, decreased bowel sounds, mildly distended, Midline staples intact  Skin: warm, dry, normal color, no obvious rash or abnormal lesions      Assessment/Plan:  -89 year old male with a past medical history of abdominal surgery (right hemicolectomy, June 2019, bilateral inguinal hernia repair), CAD s/p CABG (>20 years ago), BPH, HTN and recent hospitalization for 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     -Fall likely due to deconditioning with component of dehydration vs. arrhythmia vs. unlikely underlying ACS or CVA  -Patient with facial droop appreciated on L, per wife this is new. Facial droop disappears on neuro exam, will obtain CT head given incident  -EKG obtained, tachycardia  no concerns for ST elevation/depression, no QT prolongation, LAD.   -NMote, patient has been receiving metoclopramide Q4H PRN, but no QT prolongation as above  -Patient is s/p 250 ML Bolus, will consider another 250 mL bolus if BP does not elevate and if HR persists  -May consider 5 mg IV metoprolol vs. Cardizem push if patient sustained tachycardia   -Patient without known history of CHF, no records in system or via HIE but given cardiac history will be cautions with fluid repletion  -STAT CMP, CBC w diff, Mg, Phos, Cardiac Enzymes, Lactate  -Attending Physician Dr. Mccain made aware, and Dr. Rooney aware and at bedside.  -Will assess orthostatics Resident Rapid Response Note    Rapid response was called at 17:00 for fall and change in mental status.     Events leading up to Rapid Response:    Patient was being transferred from chair to bed per nursing staff, and patient fell. Per patient, no LOC. No head trauma. Upon transferring to bed, patient became more responsive, answering questions. Denied pain. Patient was in 130's per tele tech. Patient was seen and examined at the bedside by the rapid response team and resident medicine team. Dr. Tipton and ICU PA Cody present.     Rapid Response Vital Signs:  BP: 117/75  HR: 126  RR: 32  SpO2: 92% on 2L NC  Temp: 98.3  FS: 128      Physical Exam:  General: Well developed, well nourished, in no acute distress  HEENT: NCAT, PERRLA, EOMI bl, moist mucous membranes   Neurology: A&Ox3, nonfocal, CN II-XII grossly intact, sensation intact, UE 5/5 MMS and LE 5/5 MMS, L sided facial droop  Respiratory: CTA B/L, No wheezing, rales, or rhonchi  CV: RRR, S1/S2 present, no murmurs, rubs, or gallops  : Wade catheter in place, tea colored urine in wade bag, clear in wade tube  Abdominal: Soft, diffuse tenderness, decreased bowel sounds, mildly distended, Midline staples intact  Skin: warm, dry, normal color, no obvious rash or abnormal lesions      Assessment/Plan: 89 year old male with a past medical history of abdominal surgery (right hemicolectomy, June 2019, bilateral inguinal hernia repair), CAD s/p CABG (>20 years ago), BPH, HTN and recent hospitalization for 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 RRT called for change in mental status.    -Fall likely due to deconditioning with component of dehydration vs. arrhythmia vs. unlikely underlying ACS or CVA  -Patient with facial droop appreciated on L, per wife this is new. Facial droop disappears on neuro exam, will obtain CT head given incident  -EKG obtained, tachycardia  no concerns for ST elevation/depression, no QT prolongation, LAD.   -NMote, patient has been receiving metoclopramide Q4H PRN, but no QT prolongation as above  -Patient is s/p 250 ML Bolus, will consider another 250 mL bolus if BP does not elevate and if HR persists  -May consider 5 mg IV metoprolol vs. Cardizem push if patient sustained tachycardia   -Patient without known history of CHF, no records in system or via HIE but given cardiac history will be cautions with fluid repletion  -STAT CMP, CBC w diff, Mg, Phos, Cardiac Enzymes, Lactate  -Attending Physician Dr. Mccain made aware, and Dr. Rooney aware and at bedside.  -Will assess orthostatics Resident Rapid Response Note    Rapid response was called at 17:00 for fall and change in mental status.     Events leading up to Rapid Response:    Patient was being transferred from chair to bed per nursing staff, and patient fell. Per patient, no LOC. No head trauma. Upon transferring to bed, patient became more responsive, answering questions. Denied pain. Patient was in 130's per tele tech. Patient was seen and examined at the bedside by the rapid response team and resident medicine team. Dr. Tipton and ICU PA Cody present.     Rapid Response Vital Signs:  BP: 117/75  HR: 126  RR: 32  SpO2: 92% on 2L NC  Temp: 98.3  FS: 128      Physical Exam:  General: Well developed, well nourished, in no acute distress  HEENT: NCAT, PERRLA, EOMI bl, moist mucous membranes   Neurology: A&Ox3, nonfocal, CN II-XII grossly intact, sensation intact, UE 5/5 MMS and LE 5/5 MMS, L sided facial droop  Respiratory: CTA B/L, No wheezing, rales, or rhonchi  CV: RRR, S1/S2 present, no murmurs, rubs, or gallops  : Wade catheter in place, tea colored urine in wade bag, clear in wade tube  Abdominal: Soft, diffuse tenderness, decreased bowel sounds, mildly distended, Midline staples intact  Skin: warm, dry, normal color, no obvious rash or abnormal lesions      Assessment/Plan: 89 year old male with a past medical history of abdominal surgery (right hemicolectomy, June 2019, bilateral inguinal hernia repair), CAD s/p CABG (>20 years ago), BPH, HTN and recent hospitalization for 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 RRT called for change in mental status.    -Fall likely due to deconditioning with component of dehydration vs. arrhythmia vs. unlikely underlying ACS or CVA  -Patient with facial droop appreciated on L, per wife this is new. Facial droop disappears on neuro exam, will obtain CT head given incident  -EKG obtained, tachycardia  no concerns for ST elevation/depression, no QT prolongation, LAD.   -NMote, patient has been receiving metoclopramide Q4H PRN, but no QT prolongation as above  -Patient is s/p 250 ML Bolus, will consider another 250 mL bolus if BP does not elevate and if HR persists  -May consider 5 mg IV metoprolol vs. Cardizem push if patient sustained tachycardia   -Patient without known history of CHF, no records in system or via HIE but given cardiac history will be cautions with fluid repletion  -STAT CMP, CBC w diff, Mg, Phos, Cardiac Enzymes, Lactate  -Attending Physician Dr. Mccain made aware, and Dr. Rooney aware and at bedside.  -Will assess orthostatics  - RN to call for any changes    Addendum: 8/8/19 at 18:02- Received a call back from Radiologist, Dr. Weldon regarding CT Brain. Reports that there is no evidence of abnormality of brain. Will sign out to night team for follow up.

## 2019-08-08 NOTE — CONSULT NOTE ADULT - ASSESSMENT
89 year old male with a past medical history of abdominal surgery (right hemicolectomy, June 2019, bilateral inguinal hernia repair), CAD s/p CABG (>20 years ago), BPH, HTN and recent hospitalization for SBO 2/2 cecal volvulus requiring surgical intervention, who s/p  ex-lap, SBR and DIONE presents with abdominal pain, nausea, vomiting. Today he is S/P RRT for witnessed syncopal event.  He was in ST @ 130s on monitor.  CT head - for CVA     NSTEMI  -Pt with elevated troponins 26  - Bedside TTE done by DR. Payne though a limited study showed moderate systolic dysfunction   -Repeat TTE  - No anticoagulation as pt is POD 5 from ex lap will clarify with surgery regarding antiplatelet therapy  - Pt has had an elevation of lactate beginning 8/3 and creatinine has doubled in the past day at this point it is unclear if the NSTEMI is reactive to an underlying process in the abdomin/ chest  - He is currently warm and perfused   - Stat ABG  - CW metoprolol  - would be cautious with fluid administration to prevent volume overload  -Monitor strict I&O's Please  -Monitor Daily weights      -there is no evidence of significant arrhythmia.  SR on monitor   -ICU consult for a higher level of care  -there is no evidence for meaningful  volume overload.  There is evedience of volume overload on his physical exam   -Repeat TTE  -Would get a noncontrast CT of chest to r/o pulmonary process  - Pt has recently started on PO intake and in complaining of Nausea and abdominal pain which he was attributing to his recent surgery    -Pt is hemodynamically stable at this time   -Monitor and replete lytes, keep K>4 and Mg >2  -other care as per ICU team  Thank you for the consult  Will continue to follow  Cody JOHNSON 89 year old male with a past medical history of abdominal surgery (right hemicolectomy, June 2019, bilateral inguinal hernia repair), CAD s/p CABG (>20 years ago), BPH, HTN and recent hospitalization for 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         -Pt with elevated troponins 26  - Bedside TTE done by DR. Payne though a limited study showed moderate systolic dysfunction   -Repeat official  TTE  - No anticoagulation as pt is POD 5 from ex lap will clarify with surgery regarding antiplatelet therapy  - Pt has had an elevation of lactate beginning 8/3 and creatinine has doubled in the past day at this point it is unclear if the NSTEMI is reactive to an underlying process in the abdomen\chest  - Would consider CTA of c/a/p. r/o PE    - Stat ABG  - CW metoprolol  - would be cautious with fluid administration to prevent volume overload  - Please continue to maintain strict I/Os, monitor daily weights, Cr, and K.      -there is no evidence of significant arrhythmia. now with ST likely reactive.   -ICU consult for a higher level of care        - Pt has recently started on PO intake and in complaining of Nausea and abdominal pain which he was attributing to his recent surgery, ?aspiration    -Pt is hemodynamically stable at this time   -Monitor and replete lytes, keep K>4 and Mg >2  -other care as per ICU team  Thank you for the consult  Will continue to follow  Cody JOHNSON

## 2019-08-08 NOTE — CONSULT NOTE ADULT - SUBJECTIVE AND OBJECTIVE BOX
CHIEF COMPLAINT: Patient is a 89y old  Male who presents with a chief complaint of SBO (08 Aug 2019 10:43)      HPI:  89 year old male with a past medical history of abdominal surgery (right hemicolectomy, June 2019, bilateral inguinal hernia repair), CAD s/p CABG (>20 years ago), BPH, HTN and recent hospitalization for 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 He reports that he gradually developed sharp, severe pain in his abdomen around 1:00PM this afternoon which remains unchanged. The pain is localized to his RUQ and epigastric/umbilical region. He denies radiation of pain, aggravating/alleviating factors. He has been nauseous with a minimal amount of vomiting. He has not passed gas or had a BM since the onset of symptoms. He denies a family history of GI cancer. He denies fevers, chills, recent weight loss, chest pain, shortness of breath, hematochezia, melena, dysuria, HA, blurry vision.    ED Course: Vital Signs: T(F): 97.8, HR: 70 - 85, BP: 184/94 - 209/95, RR: 16, SpO2: 96% - 98%. CT abdomen and pelvis revealed small bowel obstruction with multiple transition points in the central abdomen as well as mesenteric edema and ischemia in the right hemiabdomen. Given NS bolus and odansetron for nausea, and NG tube placed with 100 cc of green fluid decompressed from stomach. Labs notable for WBC count 6.58, lactate 1.2, Na 134, lipase 171, amylase 83. UA negative. EKG nsr. Hypertensive to 209/95, given labetalol and hydralazine. (03 Aug 2019 20:00)      PAST MEDICAL & SURGICAL HISTORY:  CAD (coronary artery disease): s/p CABG, 1995  Anginal pain  HTN (hypertension)  Enlarged prostate  H/O hemicolectomy  H/O inguinal hernia repair: bilateral  S/P TURP: x2      SOCIAL HISTORY:  No tobacco, ethanol, or drug abuse.    FAMILY HISTORY:  No pertinent family history in first degree relatives    No family history of acute MI or sudden cardiac death.    MEDICATIONS  (STANDING):  enoxaparin Injectable 40 milliGRAM(s) SubCutaneous daily  finasteride 5 milliGRAM(s) Oral daily  lactated ringers Bolus 250 milliLiter(s) IV Bolus once  lactated ringers. 1000 milliLiter(s) (75 mL/Hr) IV Continuous <Continuous>  metoclopramide 10 milliGRAM(s) Oral every 4 hours  metoprolol succinate ER 50 milliGRAM(s) Oral daily  NIFEdipine XL 60 milliGRAM(s) Oral daily  tamsulosin 0.4 milliGRAM(s) Oral at bedtime    MEDICATIONS  (PRN):  acetaminophen   Tablet .. 650 milliGRAM(s) Oral every 6 hours PRN Mild Pain (1 - 3)  HYDROmorphone  Injectable 0.5 milliGRAM(s) IV Push every 4 hours PRN Severe Pain (7 - 10)  morphine  - Injectable 2 milliGRAM(s) IV Push every 4 hours PRN Moderate Pain (4 - 6)  morphine  - Injectable 1 milliGRAM(s) IV Push every 10 minutes PRN Severe Pain (7 - 10)  morphine  - Injectable 0.5 milliGRAM(s) IV Push every 10 minutes PRN Moderate Pain (4 - 6)  ondansetron Injectable 4 milliGRAM(s) IV Push every 6 hours PRN Nausea      Allergies    No Known Allergies    Intolerances        REVIEW OF SYSTEMS:    CONSTITUTIONAL: No weakness, fevers or chills  EYES/ENT: No visual changes;  No vertigo or throat pain   NECK: No pain or stiffness  RESPIRATORY: No cough, wheezing, hemoptysis; No shortness of breath  CARDIOVASCULAR: No chest pain or palpitations  GASTROINTESTINAL: No abdominal pain. No nausea, vomiting, or hematemesis; No diarrhea or constipation. No melena or hematochezia.  GENITOURINARY: No dysuria, frequency or hematuria  NEUROLOGICAL: No numbness or weakness  SKIN: No itching or rash  All other review of systems is negative unless indicated above    VITAL SIGNS:   Vital Signs Last 24 Hrs  T(C): 36.5 (08 Aug 2019 16:23), Max: 37.7 (08 Aug 2019 00:18)  T(F): 97.7 (08 Aug 2019 16:23), Max: 99.9 (08 Aug 2019 00:18)  HR: 120 (08 Aug 2019 16:23) (102 - 120)  BP: 113/68 (08 Aug 2019 16:23) (113/68 - 148/89)  BP(mean): --  RR: 17 (08 Aug 2019 16:23) (16 - 17)  SpO2: 93% (08 Aug 2019 16:23) (93% - 97%)    I&O's Summary    07 Aug 2019 07:01  -  08 Aug 2019 07:00  --------------------------------------------------------  IN: 2420 mL / OUT: 1450 mL / NET: 970 mL    08 Aug 2019 07:01  -  08 Aug 2019 18:02  --------------------------------------------------------  IN: 300 mL / OUT: 0 mL / NET: 300 mL        On Exam:    Constitutional: NAD, alert and oriented x 3  Lungs:  Non-labored, breath sounds are clear bilaterally, No wheezing, rales or rhonchi  Cardiovascular: RRR.  S1 and S2 positive.  No murmurs, rubs, gallops or clicks  Gastrointestinal: Bowel Sounds present, soft, nontender.   Lymph: No peripheral edema. No cervical lymphadenopathy.  Neurological: Alert, no focal deficits  Skin: No rashes or ulcers   Psych:  Mood & affect appropriate.    LABS: All Labs Reviewed:                        10.6   7.26  )-----------( 304      ( 08 Aug 2019 17:21 )             30.9                         9.5    6.66  )-----------( 248      ( 08 Aug 2019 05:59 )             27.4                         9.7    5.20  )-----------( 214      ( 07 Aug 2019 06:58 )             27.4     08 Aug 2019 17:21    135    |  100    |  23     ----------------------------<  133    4.4     |  22     |  1.20   08 Aug 2019 05:59    138    |  100    |  19     ----------------------------<  120    3.7     |  29     |  0.77   07 Aug 2019 06:58    136    |  99     |  19     ----------------------------<  156    3.4     |  28     |  0.59     Ca    8.4        08 Aug 2019 17:21  Ca    8.2        08 Aug 2019 05:59  Ca    8.4        07 Aug 2019 06:58  Phos  3.1       08 Aug 2019 17:21  Mg     1.9       08 Aug 2019 17:21    TPro  6.1    /  Alb  2.4    /  TBili  0.5    /  DBili  x      /  AST  134    /  ALT  26     /  AlkPhos  58     08 Aug 2019 17:21      CARDIAC MARKERS ( 08 Aug 2019 17:21 )  26.800 ng/mL / x     / 597 U/L / x     / 77.0 ng/mL      Blood Culture: Organism --  Gram Stain Blood -- Gram Stain --  Specimen Source .Blood Blood-Peripheral  Culture-Blood --    Organism --  Gram Stain Blood -- Gram Stain --  Specimen Source .Urine Clean Catch (Midstream)  Culture-Blood --            RADIOLOGY:    EKG:    Assessment/Plan:  89y Male CHIEF COMPLAINT: Patient is a 89y old  Male who s/p  ex-lap, SBR and DIONE for SBO now S?P RRT      HPI:  89 year old male with a past medical history of abdominal surgery (right hemicolectomy, June 2019, bilateral inguinal hernia repair), CAD s/p CABG (>20 years ago), BPH, HTN and recent hospitalization for SBO 2/2 cecal volvulus requiring surgical intervention, who s/p  ex-lap, SBR and DIONE presents with abdominal pain, nausea, vomiting. Today he is S/P RRT for witnessed syncopal event.  He was in ST @ 130s on monitor.  CT head - for CVA. We were called for consult and pt found to have elevated CE, lactate and creatinine.  W/ labored breathing and tachycardic to 120s.    PAST MEDICAL & SURGICAL HISTORY:  CAD (coronary artery disease): s/p CABG, 1995  Anginal pain  HTN (hypertension)  Enlarged prostate  H/O hemicolectomy  H/O inguinal hernia repair: bilateral  S/P TURP: x2      SOCIAL HISTORY:  No tobacco, ethanol, or drug abuse.    FAMILY HISTORY:  No pertinent family history in first degree relatives    No family history of acute MI or sudden cardiac death.    MEDICATIONS  (STANDING):  enoxaparin Injectable 40 milliGRAM(s) SubCutaneous daily  finasteride 5 milliGRAM(s) Oral daily  lactated ringers Bolus 250 milliLiter(s) IV Bolus once  lactated ringers. 1000 milliLiter(s) (75 mL/Hr) IV Continuous <Continuous>  metoclopramide 10 milliGRAM(s) Oral every 4 hours  metoprolol succinate ER 50 milliGRAM(s) Oral daily  NIFEdipine XL 60 milliGRAM(s) Oral daily  tamsulosin 0.4 milliGRAM(s) Oral at bedtime    MEDICATIONS  (PRN):  acetaminophen   Tablet .. 650 milliGRAM(s) Oral every 6 hours PRN Mild Pain (1 - 3)  HYDROmorphone  Injectable 0.5 milliGRAM(s) IV Push every 4 hours PRN Severe Pain (7 - 10)  morphine  - Injectable 2 milliGRAM(s) IV Push every 4 hours PRN Moderate Pain (4 - 6)  morphine  - Injectable 1 milliGRAM(s) IV Push every 10 minutes PRN Severe Pain (7 - 10)  morphine  - Injectable 0.5 milliGRAM(s) IV Push every 10 minutes PRN Moderate Pain (4 - 6)  ondansetron Injectable 4 milliGRAM(s) IV Push every 6 hours PRN Nausea      Allergies    No Known Allergies    Intolerances        REVIEW OF SYSTEMS:    CONSTITUTIONAL: No weakness, fevers or chills  EYES/ENT: No visual changes;  No vertigo or throat pain   NECK: No pain or stiffness  RESPIRATORY: No cough, wheezing, hemoptysis; No shortness of breath  CARDIOVASCULAR: No chest pain or palpitations  GASTROINTESTINAL: No abdominal pain. No nausea, vomiting, or hematemesis; No diarrhea or constipation. No melena or hematochezia.  GENITOURINARY: No dysuria, frequency or hematuria  NEUROLOGICAL: No numbness or weakness  SKIN: No itching or rash  All other review of systems is negative unless indicated above    VITAL SIGNS:   Vital Signs Last 24 Hrs  T(C): 36.5 (08 Aug 2019 16:23), Max: 37.7 (08 Aug 2019 00:18)  T(F): 97.7 (08 Aug 2019 16:23), Max: 99.9 (08 Aug 2019 00:18)  HR: 120 (08 Aug 2019 16:23) (102 - 120)  BP: 113/68 (08 Aug 2019 16:23) (113/68 - 148/89)  BP(mean): --  RR: 17 (08 Aug 2019 16:23) (16 - 17)  SpO2: 93% (08 Aug 2019 16:23) (93% - 97%)    I&O's Summary    07 Aug 2019 07:01  -  08 Aug 2019 07:00  --------------------------------------------------------  IN: 2420 mL / OUT: 1450 mL / NET: 970 mL    08 Aug 2019 07:01  -  08 Aug 2019 18:02  --------------------------------------------------------  IN: 300 mL / OUT: 0 mL / NET: 300 mL        On Exam:  Tele: -130  Constitutional: NAD, alert and oriented x 3  Lungs:  labored, breath sounds with crackles Bilaterally diminished at bases  No  wheezes, or rhonchi   Cardiovascular: RRR.  S1 and S2 positive.  No murmurs, rubs, gallops or clicks  Gastrointestinal: Bowel Sounds present, soft, nontender.   Lymph: No peripheral edema. No cervical lymphadenopathy.  Neurological: Alert, no focal deficits  Skin: No rashes or ulcers   Psych:  Mood & affect appropriate.    LABS: All Labs Reviewed:                        10.6   7.26  )-----------( 304      ( 08 Aug 2019 17:21 )             30.9                         9.5    6.66  )-----------( 248      ( 08 Aug 2019 05:59 )             27.4                         9.7    5.20  )-----------( 214      ( 07 Aug 2019 06:58 )             27.4     08 Aug 2019 17:21    135    |  100    |  23     ----------------------------<  133    4.4     |  22     |  1.20   08 Aug 2019 05:59    138    |  100    |  19     ----------------------------<  120    3.7     |  29     |  0.77   07 Aug 2019 06:58    136    |  99     |  19     ----------------------------<  156    3.4     |  28     |  0.59     Ca    8.4        08 Aug 2019 17:21  Ca    8.2        08 Aug 2019 05:59  Ca    8.4        07 Aug 2019 06:58  Phos  3.1       08 Aug 2019 17:21  Mg     1.9       08 Aug 2019 17:21    TPro  6.1    /  Alb  2.4    /  TBili  0.5    /  DBili  x      /  AST  134    /  ALT  26     /  AlkPhos  58     08 Aug 2019 17:21      CARDIAC MARKERS ( 08 Aug 2019 17:21 )  26.800 ng/mL / x     / 597 U/L / x     / 77.0 ng/mL      Blood Culture: Organism --  Gram Stain Blood -- Gram Stain --  Specimen Source .Blood Blood-Peripheral  Culture-Blood --    Organism --  Gram Stain Blood -- Gram Stain --  Specimen Source .Urine Clean Catch (Midstream)  Culture-Blood --            RADIOLOGY:  < from: CT Head No Cont (08.08.19 @ 17:49) >  EXAM:  CT BRAIN                            PROCEDURE DATE:  08/08/2019          INTERPRETATION:  CLINICAL STATEMENT: Hypertension and fall    TECHNIQUE: CT of the head was performed without IV contrast.    COMPARISON: 10/2/2017    FINDINGS: The study is slightly degraded by motion. Imaging was repeated   due to motion    There is diffuse parenchymal volume loss. There are areas of low   attenuation in the periventricular white matter likely related to chronic   microvascular ischemic changes.    There is no acute parenchymal hemorrhage, parenchymal mass, mass effect   or midline shift. There is no extra-axial fluid collection. There is no   acute territorial infarct. Cavum septum lucidum and vergae again noted    The ventricles are prominent likely secondary to central atrophy. There   is intracranial atherosclerosis    The calvarium is intact. The visualized paranasal sinuses are well   aerated. The visualized orbits are remarkable for postoperative changes   of the globes. Incidental note is made of an incomplete posterior arch of   C1, a normal variant    IMPRESSION:    No acute intracranial hemorrhage or acute territorial infarct. The   results were discussed with the resident Lexie Betancourt at 6:00 PM on August 8, 2019.                TAVIA MARRERO M.D.,ATTENDING RADIOLOGIST  This document has been electronically signed. Aug  8 2019  6:02PM    < end of copied text >    EKG:  Sinus tach CHIEF COMPLAINT: Patient is a 89y old  Male who s/p  ex-lap, SBR and DIONE for SBO now S?P RRT      HPI:  89 year old male with a past medical history of abdominal surgery (right hemicolectomy, June 2019, bilateral inguinal hernia repair), CAD s/p CABG (>20 years ago), BPH, HTN and recent hospitalization for SBO 2/2 cecal volvulus requiring surgical intervention, who s/p  ex-lap, SBR and DIONE presents with abdominal pain, nausea, vomiting. Today he is S/P RRT for witnessed syncopal event.  He was in ST @ 130s on monitor.  CT head - for CVA. We were called for consult and pt found to have elevated CE, lactate and creatinine.  W/ labored breathing and tachycardic to 120s.    Currently pt is tachypneic. he feels dyspneic. c/o abd pain at incision.   Denies any chest pain,  palpitations, PND,  near syncope, syncope, lower extremity edema, stroke like symptoms.       ROS as listed otherwise negative     PAST MEDICAL & SURGICAL HISTORY:  CAD (coronary artery disease): s/p CABG, 1995  Anginal pain  HTN (hypertension)  Enlarged prostate  H/O hemicolectomy  H/O inguinal hernia repair: bilateral  S/P TURP: x2      SOCIAL HISTORY:  No tobacco, ethanol, or drug abuse.    FAMILY HISTORY:  No pertinent family history in first degree relatives    No family history of acute MI or sudden cardiac death.    MEDICATIONS  (STANDING):  enoxaparin Injectable 40 milliGRAM(s) SubCutaneous daily  finasteride 5 milliGRAM(s) Oral daily  lactated ringers Bolus 250 milliLiter(s) IV Bolus once  lactated ringers. 1000 milliLiter(s) (75 mL/Hr) IV Continuous <Continuous>  metoclopramide 10 milliGRAM(s) Oral every 4 hours  metoprolol succinate ER 50 milliGRAM(s) Oral daily  NIFEdipine XL 60 milliGRAM(s) Oral daily  tamsulosin 0.4 milliGRAM(s) Oral at bedtime    MEDICATIONS  (PRN):  acetaminophen   Tablet .. 650 milliGRAM(s) Oral every 6 hours PRN Mild Pain (1 - 3)  HYDROmorphone  Injectable 0.5 milliGRAM(s) IV Push every 4 hours PRN Severe Pain (7 - 10)  morphine  - Injectable 2 milliGRAM(s) IV Push every 4 hours PRN Moderate Pain (4 - 6)  morphine  - Injectable 1 milliGRAM(s) IV Push every 10 minutes PRN Severe Pain (7 - 10)  morphine  - Injectable 0.5 milliGRAM(s) IV Push every 10 minutes PRN Moderate Pain (4 - 6)  ondansetron Injectable 4 milliGRAM(s) IV Push every 6 hours PRN Nausea      Allergies    No Known Allergies    Intolerances              VITAL SIGNS:   Vital Signs Last 24 Hrs  T(C): 36.5 (08 Aug 2019 16:23), Max: 37.7 (08 Aug 2019 00:18)  T(F): 97.7 (08 Aug 2019 16:23), Max: 99.9 (08 Aug 2019 00:18)  HR: 120 (08 Aug 2019 16:23) (102 - 120)  BP: 113/68 (08 Aug 2019 16:23) (113/68 - 148/89)  BP(mean): --  RR: 17 (08 Aug 2019 16:23) (16 - 17)  SpO2: 93% (08 Aug 2019 16:23) (93% - 97%)    I&O's Summary    07 Aug 2019 07:01  -  08 Aug 2019 07:00  --------------------------------------------------------  IN: 2420 mL / OUT: 1450 mL / NET: 970 mL    08 Aug 2019 07:01  -  08 Aug 2019 18:02  --------------------------------------------------------  IN: 300 mL / OUT: 0 mL / NET: 300 mL        On Exam:  Tele: -130  Constitutional: NAD, alert and oriented x 3  Lungs:  labored, breath sounds with crackles Bilaterally diminished at bases  No  wheezes, or rhonchi   Cardiovascular: tachy.  S1 and S2 positive.  No murmurs, rubs, gallops or clicks  Gastrointestinal: Bowel Sounds present, soft, mildly tender, distended   Lymph: trace peripheral edema. No cervical lymphadenopathy.  Neurological: Alert, no focal deficits  Skin: + healing abd incision   Psych:  Mood & affect appropriate.    LABS: All Labs Reviewed:                        10.6 7.26  )-----------( 304      ( 08 Aug 2019 17:21 )             30.9                         9.5    6.66  )-----------( 248      ( 08 Aug 2019 05:59 )             27.4                         9.7    5.20  )-----------( 214      ( 07 Aug 2019 06:58 )             27.4     08 Aug 2019 17:21    135    |  100    |  23     ----------------------------<  133    4.4     |  22     |  1.20   08 Aug 2019 05:59    138    |  100    |  19     ----------------------------<  120    3.7     |  29     |  0.77   07 Aug 2019 06:58    136    |  99     |  19     ----------------------------<  156    3.4     |  28     |  0.59     Ca    8.4        08 Aug 2019 17:21  Ca    8.2        08 Aug 2019 05:59  Ca    8.4        07 Aug 2019 06:58  Phos  3.1       08 Aug 2019 17:21  Mg     1.9       08 Aug 2019 17:21    TPro  6.1    /  Alb  2.4    /  TBili  0.5    /  DBili  x      /  AST  134    /  ALT  26     /  AlkPhos  58     08 Aug 2019 17:21      CARDIAC MARKERS ( 08 Aug 2019 17:21 )  26.800 ng/mL / x     / 597 U/L / x     / 77.0 ng/mL      Blood Culture: Organism --  Gram Stain Blood -- Gram Stain --  Specimen Source .Blood Blood-Peripheral  Culture-Blood --    Organism --  Gram Stain Blood -- Gram Stain --  Specimen Source .Urine Clean Catch (Midstream)  Culture-Blood --            RADIOLOGY:  < from: CT Head No Cont (08.08.19 @ 17:49) >  EXAM:  CT BRAIN                            PROCEDURE DATE:  08/08/2019          INTERPRETATION:  CLINICAL STATEMENT: Hypertension and fall    TECHNIQUE: CT of the head was performed without IV contrast.    COMPARISON: 10/2/2017    FINDINGS: The study is slightly degraded by motion. Imaging was repeated   due to motion    There is diffuse parenchymal volume loss. There are areas of low   attenuation in the periventricular white matter likely related to chronic   microvascular ischemic changes.    There is no acute parenchymal hemorrhage, parenchymal mass, mass effect   or midline shift. There is no extra-axial fluid collection. There is no   acute territorial infarct. Cavum septum lucidum and vergae again noted    The ventricles are prominent likely secondary to central atrophy. There   is intracranial atherosclerosis    The calvarium is intact. The visualized paranasal sinuses are well   aerated. The visualized orbits are remarkable for postoperative changes   of the globes. Incidental note is made of an incomplete posterior arch of   C1, a normal variant    IMPRESSION:    No acute intracranial hemorrhage or acute territorial infarct. The   results were discussed with the resident Lexie Betancourt at 6:00 PM on August 8, 2019.                TAVIA MARRERO M.D.,ATTENDING RADIOLOGIST  This document has been electronically signed. Aug  8 2019  6:02PM    < end of copied text >    EKG:  Sinus tach

## 2019-08-08 NOTE — CHART NOTE - NSCHARTNOTEFT_GEN_A_CORE
Resident Rapid Response Note    Rapid response was called at 17:00 for fall and change in mental status.     Events leading up to Rapid Response:    Patient was being transferred from chair to bed per nursing staff, and patient fell. Per patient, no LOC. No head trauma. Upon transferring to bed, patient became more responsive, answering questions. Denied pain. Patient was in 130's per tele tech. Patient was seen and examined at the bedside by the rapid response team and resident medicine team. Dr. Tipton and ICU KASEY Ross present.     Interval history:  Patient was evaluated by Cardiology.  Given Troponin I 26.8 patient transferred to ICU for further management.    Vital Signs Last 24 Hrs  T(C): 36.5 (08 Aug 2019 16:23), Max: 37.7 (08 Aug 2019 00:18)  T(F): 97.7 (08 Aug 2019 16:23), Max: 99.9 (08 Aug 2019 00:18)  HR: 120 (08 Aug 2019 16:23) (102 - 120)  BP: 113/68 (08 Aug 2019 16:23) (113/68 - 148/89)  BP(mean): --  RR: 17 (08 Aug 2019 16:23) (16 - 17)  SpO2: 93% (08 Aug 2019 16:23) (93% - 97%)      Physical Exam:  General: Well developed, well nourished, in no acute distress  HEENT: NCAT, PERRLA, EOMI bl, moist mucous membranes   Neurology: A&Ox3, nonfocal, CN II-XII grossly intact, L sided facial droop  Respiratory: CTA B/L, No wheezing, rales, or rhonchi  CV: fast, S1/S2 present, no murmurs, rubs, or gallops  : Wade catheter in place, tea colored urine in wade bag, clear in wade tube  Abdominal: Soft, diffuse tenderness, decreased bowel sounds, mildly distended, Midline staples intact  Skin: warm, dry, normal color, no obvious rash or abnormal lesions                            10.6   7.26  )-----------( 304      ( 08 Aug 2019 17:21 )             30.9     08 Aug 2019 17:21    135    |  100    |  23     ----------------------------<  133    4.4     |  22     |  1.20     Ca    8.4        08 Aug 2019 17:21  Phos  3.1       08 Aug 2019 17:21  Mg     1.9       08 Aug 2019 17:21    TPro  6.1    /  Alb  2.4    /  TBili  0.5    /  DBili  x      /  AST  134    /  ALT  26     /  AlkPhos  58     08 Aug 2019 17:21    CARDIAC MARKERS ( 08 Aug 2019 17:21 )  26.800 ng/mL / x     / 597 U/L / x     / 77.0 ng/mL      CAPILLARY BLOOD GLUCOSE      POCT Blood Glucose.: 128 mg/dL (08 Aug 2019 17:00)        Assessment/Plan: 89 year old male with a past medical history of abdominal surgery (right hemicolectomy, June 2019, bilateral inguinal hernia repair), CAD s/p CABG (>20 years ago), BPH, HTN and recent hospitalization for 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 RRT called for change in mental status. Patient now in ICU with NSTEMI.    - Fall likely due to unclear etiology  - labs with trop 26.8 & 40, lactate 6.8, bandemia 10% normal wbc,   - Patient with facial droop appreciated on L, per wife this is new. Facial droop disappears on neuro exam, CT head negative for acute abnormality  - NSTEMI with tachycardia  no concerns for ST elevation/depression, no QT prolongation, LAD. As per cardio unclear if the NSTEMI is reactive to an underlying process in the abdomin/ chest  -NMote, patient has been receiving metoclopramide Q4H PRN, but no QT prolongation as above  -Patient is s/p 250 ML Bolus, will consider another 250 mL bolus if BP does not elevate and if HR persists  -Patient without known history of CHF, no records in system or via HIE but given cardiac history will be cautions with fluid repletion  -Attending Physician Dr. Mccain made aware, and Dr. Rooney aware and at bedside. Resident Rapid Response Note    Rapid response was called at 17:00 for fall and change in mental status.     Events leading up to Rapid Response:    Patient was being transferred from chair to bed per nursing staff, and patient fell. Per patient, no LOC. No head trauma. Upon transferring to bed, patient became more responsive, answering questions. Denied pain. Patient was in 130's per tele tech. Patient was seen and examined at the bedside by the rapid response team and resident medicine team. Dr. Tipton and ICU KASEY Ross present.     Interval history:  Patient was evaluated by Cardiology.  Given Troponin I 26.8 patient transferred to ICU for further management.    Vital Signs Last 24 Hrs  T(C): 36.5 (08 Aug 2019 16:23), Max: 37.7 (08 Aug 2019 00:18)  T(F): 97.7 (08 Aug 2019 16:23), Max: 99.9 (08 Aug 2019 00:18)  HR: 120 (08 Aug 2019 16:23) (102 - 120)  BP: 113/68 (08 Aug 2019 16:23) (113/68 - 148/89)  BP(mean): --  RR: 17 (08 Aug 2019 16:23) (16 - 17)  SpO2: 93% (08 Aug 2019 16:23) (93% - 97%)      Physical Exam:  General: Well developed, well nourished, in no acute distress  HEENT: NCAT, PERRLA, EOMI bl, moist mucous membranes   Neurology: A&Ox3, nonfocal, CN II-XII grossly intact, L sided facial droop  Respiratory: CTA B/L, No wheezing, rales, or rhonchi  CV: fast, S1/S2 present, no murmurs, rubs, or gallops  : Wade catheter in place, tea colored urine in wade bag, clear in wade tube  Abdominal: Soft, diffuse tenderness, decreased bowel sounds, mildly distended, Midline staples intact  Skin: warm, dry, normal color, no obvious rash or abnormal lesions                            10.6   7.26  )-----------( 304      ( 08 Aug 2019 17:21 )             30.9     08 Aug 2019 17:21    135    |  100    |  23     ----------------------------<  133    4.4     |  22     |  1.20     Ca    8.4        08 Aug 2019 17:21  Phos  3.1       08 Aug 2019 17:21  Mg     1.9       08 Aug 2019 17:21    TPro  6.1    /  Alb  2.4    /  TBili  0.5    /  DBili  x      /  AST  134    /  ALT  26     /  AlkPhos  58     08 Aug 2019 17:21    CARDIAC MARKERS ( 08 Aug 2019 17:21 )  26.800 ng/mL / x     / 597 U/L / x     / 77.0 ng/mL      CAPILLARY BLOOD GLUCOSE      POCT Blood Glucose.: 128 mg/dL (08 Aug 2019 17:00)        Assessment/Plan: 89 year old male with a past medical history of abdominal surgery (right hemicolectomy, June 2019, bilateral inguinal hernia repair), CAD s/p CABG (>20 years ago), BPH, HTN and recent hospitalization for 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 RRT called for change in mental status. Patient now in ICU with NSTEMI.    - Fall likely due to unclear etiology  - labs with trop 26.8 & 34, lactate 6.8, bandemia 10% normal wbc,   - Patient with facial droop appreciated on L, per wife this is new. Facial droop disappears on neuro exam, CT head negative for acute abnormality  - NSTEMI with tachycardia  no concerns for ST elevation/depression, no QT prolongation, LAD. As per cardio unclear if the NSTEMI is reactive to an underlying process in the abdomin/ chest  -NMote, patient has been receiving metoclopramide Q4H PRN, but no QT prolongation as above  -Patient is s/p 250 ML Bolus, will consider another 250 mL bolus if BP does not elevate and if HR persists  -Patient without known history of CHF, no records in system or via HIE but given cardiac history will be cautions with fluid repletion  -Attending Physician Dr. Mccain made aware, and Dr. Rooney aware and at bedside.

## 2019-08-08 NOTE — CONSULT NOTE ADULT - ASSESSMENT
Assessment:  89 year old male with a past medical history of abdominal surgery (right hemicolectomy, June 2019, bilateral inguinal hernia repair), CAD s/p CABG (>20 years ago), BPH, HTN and recent hospitalization for SBO 2/2 cecal volvulus requiring surgical intervention, who s/p  ex-lap, SBR and DIONE presents with abdominal pain, nausea, vomiting and now is S/P RRT for witnessed syncopal event and now transferred to ICU for NSTEMI        Plan:    #Neuro:  -neuro checks q 4 hrs and prn for changes  -bedrest  -physical therapy consult when stable  -Pt control with morphine 2 mg IVP q 4 hrs prn pain 4-6/10    #Pulm:  -supplemental O2 to maintain SPO2 > 92%  -consider high flow N/C to help decrease WOB as needed  -HOB >/+ 30 degree angle    #CV:  -ecg now and q am x 3  -cardiac enzymes q 8 hrs x 3  -obtain TTE to eval LVFx/RVFx   -pt with sinus tachycardia  -in light of NSTEMI would continue with metoprolol     #GI/:  -NPO  -with elevated lactate and continued abd pain would obtain CT C/A/P with contrast  -Pt with MARIO (doubled sCr over past 24 hrs) - most likely pre renal  -IV fluid with LR  -strict I & O's - keep even    #ID:  -no issues at present    #FEN/ENDO/HEME:  -Hydrate with LR @ 75cc/hr prior to and after CT  -cmp/mg++/po--4/cbc with diff/coags now and q am  -trend lactate  -abg prn        Critical Care Time: 40minutes   Reviewing data, imaging, discussing with multidisciplinary team, not inclusive of procedures, discussing goals of care with family

## 2019-08-08 NOTE — PROGRESS NOTE ADULT - SUBJECTIVE AND OBJECTIVE BOX
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 He reports that he gradually developed sharp, severe pain in his abdomen around 1:00PM this afternoon which remains unchanged. The pain is localized to his RUQ and epigastric/umbilical region. He denies radiation of pain, aggravating/alleviating factors. He has been nauseous with a minimal amount of vomiting. He has not passed gas or had a BM since the onset of symptoms. He denies a family history of GI cancer. He denies fevers, chills, recent weight loss, chest pain, shortness of breath, hematochezia, melena, dysuria, HA, blurry vision.    ED Course: Vital Signs: T(F): 97.8, HR: 70 - 85, BP: 184/94 - 209/95, RR: 16, SpO2: 96% - 98%. CT abdomen and pelvis revealed small bowel obstruction with multiple transition points in the central abdomen as well as mesenteric edema and ischemia in the right hemiabdomen. Given NS bolus and odansetron for nausea, and NG tube placed with 100 cc of green fluid decompressed from stomach. Labs notable for WBC count 6.58, lactate 1.2, Na 134, lipase 171, amylase 83. UA negative. EKG nsr. Hypertensive to 209/95, given labetalol and hydralazine. (03 Aug 2019 20:00)      SUBJECTIVE & OBJECTIVE: Pt seen and examined at bedside, OOB to chair.  pain is controlled with pain meds, still has nausea. c/o discomfort at the Weaver site. on clear liq diet.  PHYSICAL EXAM:  Vital Signs Last 24 Hrs  T(C): 36.8 (08 Aug 2019 07:20), Max: 37.7 (08 Aug 2019 00:18)  T(F): 98.2 (08 Aug 2019 07:20), Max: 99.9 (08 Aug 2019 00:18)  HR: 102 (08 Aug 2019 07:20) (102 - 116)  BP: 124/65 (08 Aug 2019 07:20) (102/65 - 148/89)  BP(mean): --  RR: 16 (08 Aug 2019 07:20) (16 - 17)  SpO2: 96% (08 Aug 2019 07:20) (93% - 97%)    GENERAL: NAD, well-groomed, well-developed, morbidly obese  HEAD:  Atraumatic, Normocephalic  EYES: EOMI, PERRLA, conjunctiva and sclera clear  ENMT: Moist mucous membranes  NECK: Supple, No JVD  NERVOUS SYSTEM:  Alert & Oriented X3,   CHEST/LUNG: Clear to auscultation bilaterally; No rales, rhonchi, wheezing, or rubs  HEART: Regular rate and rhythm; No murmurs, rubs, or gallops  ABDOMEN: Soft, mildly tender, Nondistended; Bowel sounds present,   EXTREMITIES:  2+ Peripheral Pulses, No clubbing, cyanosis, or edema        MEDICATIONS  (STANDING):  enoxaparin Injectable 40 milliGRAM(s) SubCutaneous daily  finasteride 5 milliGRAM(s) Oral daily  lactated ringers. 1000 milliLiter(s) (100 mL/Hr) IV Continuous <Continuous>  metoprolol succinate ER 50 milliGRAM(s) Oral daily  NIFEdipine XL 60 milliGRAM(s) Oral daily  tamsulosin 0.4 milliGRAM(s) Oral at bedtime    MEDICATIONS  (PRN):  acetaminophen   Tablet .. 650 milliGRAM(s) Oral every 6 hours PRN Mild Pain (1 - 3)  HYDROmorphone  Injectable 0.5 milliGRAM(s) IV Push every 4 hours PRN Severe Pain (7 - 10)  morphine  - Injectable 2 milliGRAM(s) IV Push every 4 hours PRN Moderate Pain (4 - 6)  morphine  - Injectable 1 milliGRAM(s) IV Push every 10 minutes PRN Severe Pain (7 - 10)  morphine  - Injectable 0.5 milliGRAM(s) IV Push every 10 minutes PRN Moderate Pain (4 - 6)  ondansetron Injectable 4 milliGRAM(s) IV Push every 6 hours PRN Nausea      LABS:                        9.5    6.66  )-----------( 248      ( 08 Aug 2019 05:59 )             27.4     08 Aug 2019 05:59    138    |  100    |  19     ----------------------------<  120    3.7     |  29     |  0.77     Ca    8.2        08 Aug 2019 05:59          CAPILLARY BLOOD GLUCOSE        UCx       RADIOLOGY & ADDITIONAL TESTS:                          DVT/GI ppx  Discussed with pt @ bedside

## 2019-08-08 NOTE — PROVIDER CONTACT NOTE (CRITICAL VALUE NOTIFICATION) - ACTION/TREATMENT ORDERED:
No action.
Will evaluate chart as patient is post op from ischemic bowel surgery. No new orders received st this time
treatment in progress
Pt. transferred to ICU for further monitoring

## 2019-08-08 NOTE — CONSULT NOTE ADULT - SUBJECTIVE AND OBJECTIVE BOX
CHIEF COMPLAINT:    HPI:    PAST MEDICAL & SURGICAL HISTORY:  CAD (coronary artery disease): s/p CABG, 1995  Anginal pain  HTN (hypertension)  Enlarged prostate  H/O hemicolectomy  H/O inguinal hernia repair: bilateral  S/P TURP: x2      FAMILY HISTORY:  No pertinent family history in first degree relatives      SOCIAL HISTORY:  Smoking: [ ] Never Smoked [ ] Former Smoker (__ packs x ___ years) [ ] Current Smoker  (__ packs x ___ years)  Substance Use: [ ] Never Used [ ] Used ____  EtOH Use:  Marital Status: [ ] Single [ ]  [ ]  [ ]   Sexual History:   Occupation:  Recent Travel:  Country of Birth:  Advance Directives:    Allergies    No Known Allergies    Intolerances        HOME MEDICATIONS:    REVIEW OF SYSTEMS:  CONSTITUTIONAL: +weakness,without fevers or chills  EYES/ENT: No visual changes;  No vertigo or throat pain   NECK: No pain or stiffness  RESPIRATORY: No cough, wheezing, hemoptysis; No shortness of breath  CARDIOVASCULAR: No chest pain or palpitations  GASTROINTESTINAL: + abdominal or epigastric pain. s/p surgery No nausea, vomiting, or hematemesis; No diarrhea or constipation. No melena or hematochezia.  GENITOURINARY: No dysuria, frequency or hematuria  NEUROLOGICAL: No numbness + weakness  SKIN: No itching, rashes      OBJECTIVE:  ICU Vital Signs Last 24 Hrs  T(C): 37.2 (08 Aug 2019 19:17), Max: 37.7 (08 Aug 2019 00:18)  T(F): 99 (08 Aug 2019 19:17), Max: 99.9 (08 Aug 2019 00:18)  HR: 120 (08 Aug 2019 16:23) (102 - 120)  BP: 113/68 (08 Aug 2019 16:23) (113/68 - 148/89)  BP(mean): --  ABP: --  ABP(mean): --  RR: 17 (08 Aug 2019 16:23) (16 - 17)  SpO2: 93% (08 Aug 2019 16:23) (93% - 97%)        08-07 @ 07:01  -  08-08 @ 07:00  --------------------------------------------------------  IN: 2420 mL / OUT: 1450 mL / NET: 970 mL    08-08 @ 07:01  -  08-08 @ 19:25  --------------------------------------------------------  IN: 750 mL / OUT: 250 mL / NET: 500 mL      CAPILLARY BLOOD GLUCOSE      POCT Blood Glucose.: 128 mg/dL (08 Aug 2019 17:00)    VITAL SIGNS AT TIME OF CONSULT:  BP: 117/66; HR: 132 (sinus tach); RR: 28; SPO2 95 on 50% VM; Temp: 37.4 (PO)    PHYSICAL EXAM:  GENERAL: mild resp distress, tachycardic although denies sob or c/p, well-groomed, well-developed  HEAD:  Atraumatic, Normocephalic  EYES: PERRLA 3mm,  conjunctiva and sclera clear  ENMT: No oropharyngeal exudates, erythema or lesions,  Moist mucous membranes  NECK: Supple, no cervical lymphadenopathy, no JVD  NERVOUS SYSTEM: Alert & Oriented X3, CN II-XII intact, Moves all extremities  ; Upper extremities  4/5; Lower extremities 3-4/5, full sensation to light touch   CHEST/LUNG: Breath sounds bilaterally, able to take deep breaths spontaneously and upon command, diminished in lower lung fields, few bibasilar crackles,without rhonchi, wheezing, or rubs. POCUS anteriorly A line predominant with focal B lines, minimal bilat pleural effusion.  HEART: cardiac monitor Sinus tach with ventricular response of 130's  ; S1/S2 No murmurs, rubs, or gallops, POCUS with small pericardial effusion, diminished LVFx  ABDOMEN:  soft, tender in lower abd quads Rt>Lt, distended; Bowel sounds present, Bladder non distended, non palpable  EXTREMITIES: Pulses palpable radial pulses 2+ bilat, DP/PT 1+/1+ bilat, without clubbing, cyanosis. Digits warm to touch with good cap refill < 3 secs  SKIN: warm, dry, intact, normal color, no rash or abnormal lesions, without palpable nodes. lower extremities with 2 + edema      HOSPITAL MEDICATIONS:  MEDICATIONS  (STANDING):  enoxaparin Injectable 40 milliGRAM(s) SubCutaneous daily  finasteride 5 milliGRAM(s) Oral daily  lactated ringers Bolus 250 milliLiter(s) IV Bolus once  lactated ringers. 1000 milliLiter(s) (75 mL/Hr) IV Continuous <Continuous>  metoclopramide 10 milliGRAM(s) Oral every 4 hours  metoprolol succinate ER 50 milliGRAM(s) Oral daily  NIFEdipine XL 60 milliGRAM(s) Oral daily  tamsulosin 0.4 milliGRAM(s) Oral at bedtime    MEDICATIONS  (PRN):  acetaminophen   Tablet .. 650 milliGRAM(s) Oral every 6 hours PRN Mild Pain (1 - 3)  HYDROmorphone  Injectable 0.5 milliGRAM(s) IV Push every 4 hours PRN Severe Pain (7 - 10)  morphine  - Injectable 2 milliGRAM(s) IV Push every 4 hours PRN Moderate Pain (4 - 6)  morphine  - Injectable 1 milliGRAM(s) IV Push every 10 minutes PRN Severe Pain (7 - 10)  morphine  - Injectable 0.5 milliGRAM(s) IV Push every 10 minutes PRN Moderate Pain (4 - 6)  ondansetron Injectable 4 milliGRAM(s) IV Push every 6 hours PRN Nausea      LABS:                        10.6   7.26  )-----------( 304      ( 08 Aug 2019 17:21 )             30.9     Hgb Trend: 10.6<--, 9.5<--, 9.7<--, 8.7<--, 9.1<--  08-08    135  |  100  |  23  ----------------------------<  133<H>  4.4   |  22  |  1.20    Ca    8.4<L>      08 Aug 2019 17:21  Phos  3.1     08-08  Mg     1.9     08-08    TPro  6.1  /  Alb  2.4<L>  /  TBili  0.5  /  DBili  x   /  AST  134<H>  /  ALT  26  /  AlkPhos  58  08-08    Creatinine Trend: 1.20<--, 0.77<--, 0.59<--, 0.65<--, 0.79<--, 0.99<--            MICROBIOLOGY:     RADIOLOGY:  [ ] Reviewed and interpreted by me    EKG:        Rashi [facility] (ext. [id number]) CHIEF COMPLAINT:    HPI:  89 yr old male with PMHx CAD s/p CABG (1995) , BPH, HTN, bilat inguinal hernia repair, recent hospitalization at [facility] 7/2019 with cecal volvulus and found to have at that time ischemic and obstructed right cecum and colon requiring Rt hemicolectomy with rmoval of terminal ileum and leocolostomy                PAST MEDICAL & SURGICAL HISTORY:  CAD (coronary artery disease): s/p CABG, 1995  Anginal pain  HTN (hypertension)  Enlarged prostate  H/O hemicolectomy  H/O inguinal hernia repair: bilateral  S/P TURP: x2      FAMILY HISTORY:  No pertinent family history in first degree relatives      SOCIAL HISTORY:  Smoking: [ ] Never Smoked [ ] Former Smoker (__ packs x ___ years) [ ] Current Smoker  (__ packs x ___ years)  Substance Use: [ ] Never Used [ ] Used ____  EtOH Use:  Marital Status: [ ] Single [ ]  [ ]  [ ]   Sexual History:   Occupation:  Recent Travel:  Country of Birth:  Advance Directives:    Allergies    No Known Allergies    Intolerances        HOME MEDICATIONS:    REVIEW OF SYSTEMS:  CONSTITUTIONAL: +weakness,without fevers or chills  EYES/ENT: No visual changes;  No vertigo or throat pain   NECK: No pain or stiffness  RESPIRATORY: No cough, wheezing, hemoptysis; No shortness of breath  CARDIOVASCULAR: No chest pain or palpitations  GASTROINTESTINAL: + abdominal or epigastric pain. s/p surgery No nausea, vomiting, or hematemesis; No diarrhea or constipation. No melena or hematochezia.  GENITOURINARY: No dysuria, frequency or hematuria  NEUROLOGICAL: No numbness + weakness  SKIN: No itching, rashes      OBJECTIVE:  ICU Vital Signs Last 24 Hrs  T(C): 37.2 (08 Aug 2019 19:17), Max: 37.7 (08 Aug 2019 00:18)  T(F): 99 (08 Aug 2019 19:17), Max: 99.9 (08 Aug 2019 00:18)  HR: 120 (08 Aug 2019 16:23) (102 - 120)  BP: 113/68 (08 Aug 2019 16:23) (113/68 - 148/89)  BP(mean): --  ABP: --  ABP(mean): --  RR: 17 (08 Aug 2019 16:23) (16 - 17)  SpO2: 93% (08 Aug 2019 16:23) (93% - 97%)        08-07 @ 07:01 - 08-08 @ 07:00  --------------------------------------------------------  IN: 2420 mL / OUT: 1450 mL / NET: 970 mL    08-08 @ 07:01 - 08-08 @ 19:25  --------------------------------------------------------  IN: 750 mL / OUT: 250 mL / NET: 500 mL      CAPILLARY BLOOD GLUCOSE      POCT Blood Glucose.: 128 mg/dL (08 Aug 2019 17:00)    VITAL SIGNS AT TIME OF CONSULT:  BP: 117/66; HR: 132 (sinus tach); RR: 28; SPO2 95 on 50% VM; Temp: 37.4 (PO)    PHYSICAL EXAM:  GENERAL: mild resp distress, tachycardic although denies sob or c/p, well-groomed, well-developed  HEAD:  Atraumatic, Normocephalic  EYES: PERRLA 3mm,  conjunctiva and sclera clear  ENMT: No oropharyngeal exudates, erythema or lesions,  Moist mucous membranes  NECK: Supple, no cervical lymphadenopathy, no JVD  NERVOUS SYSTEM: Alert & Oriented X3, CN II-XII intact, Moves all extremities  ; Upper extremities  4/5; Lower extremities 3-4/5, full sensation to light touch   CHEST/LUNG: Breath sounds bilaterally, able to take deep breaths spontaneously and upon command, diminished in lower lung fields, few bibasilar crackles,without rhonchi, wheezing, or rubs. POCUS anteriorly A line predominant with focal B lines, minimal bilat pleural effusion.  HEART: cardiac monitor Sinus tach with ventricular response of 130's  ; S1/S2 No murmurs, rubs, or gallops, POCUS with small pericardial effusion, diminished LVFx  ABDOMEN:  soft, tender in lower abd quads Rt>Lt, distended; Bowel sounds present, Bladder non distended, non palpable  EXTREMITIES: Pulses palpable radial pulses 2+ bilat, DP/PT 1+/1+ bilat, without clubbing, cyanosis. Digits warm to touch with good cap refill < 3 secs  SKIN: warm, dry, intact, normal color, no rash or abnormal lesions, without palpable nodes. lower extremities with 2 + edema      HOSPITAL MEDICATIONS:  MEDICATIONS  (STANDING):  enoxaparin Injectable 40 milliGRAM(s) SubCutaneous daily  finasteride 5 milliGRAM(s) Oral daily  lactated ringers Bolus 250 milliLiter(s) IV Bolus once  lactated ringers. 1000 milliLiter(s) (75 mL/Hr) IV Continuous <Continuous>  metoclopramide 10 milliGRAM(s) Oral every 4 hours  metoprolol succinate ER 50 milliGRAM(s) Oral daily  NIFEdipine XL 60 milliGRAM(s) Oral daily  tamsulosin 0.4 milliGRAM(s) Oral at bedtime    MEDICATIONS  (PRN):  acetaminophen   Tablet .. 650 milliGRAM(s) Oral every 6 hours PRN Mild Pain (1 - 3)  HYDROmorphone  Injectable 0.5 milliGRAM(s) IV Push every 4 hours PRN Severe Pain (7 - 10)  morphine  - Injectable 2 milliGRAM(s) IV Push every 4 hours PRN Moderate Pain (4 - 6)  morphine  - Injectable 1 milliGRAM(s) IV Push every 10 minutes PRN Severe Pain (7 - 10)  morphine  - Injectable 0.5 milliGRAM(s) IV Push every 10 minutes PRN Moderate Pain (4 - 6)  ondansetron Injectable 4 milliGRAM(s) IV Push every 6 hours PRN Nausea      LABS:                        10.6   7.26  )-----------( 304      ( 08 Aug 2019 17:21 )             30.9     Hgb Trend: 10.6<--, 9.5<--, 9.7<--, 8.7<--, 9.1<--  08-08    135  |  100  |  23  ----------------------------<  133<H>  4.4   |  22  |  1.20    Ca    8.4<L>      08 Aug 2019 17:21  Phos  3.1     08-08  Mg     1.9     08-08    TPro  6.1  /  Alb  2.4<L>  /  TBili  0.5  /  DBili  x   /  AST  134<H>  /  ALT  26  /  AlkPhos  58  08-08    Creatinine Trend: 1.20<--, 0.77<--, 0.59<--, 0.65<--, 0.79<--, 0.99<--            MICROBIOLOGY:     RADIOLOGY:  [ ] Reviewed and interpreted by me    EKG:        Rashi [facility]-BC (ext. [id number]) CHIEF COMPLAINT:    HPI:  89 yr old male with PMHx CAD s/p CABG (1995) , BPH, HTN, bilat inguinal hernia repair, recent hospitalization at [facility] 7/2019 with cecal volvulus and found to have at that time ischemic and obstructed right cecum and colon requiring Rt hemicolectomy with removal of terminal ileum and ileocolostomy who originally presented this hospitalization 8/3/19 with abd pain, N/V found on CT A/P SBO with extensive mesenteric edema and ischemic bowel, pt received on 8/4/19 exlap for lysis of adhesions, reduction of internal hernia with segmental small bowel resection. (now POD 5)     This hospital course c/b development of MARIO with sCr 1.2 from 0.59 (8/7/19), Scr 1.3(8/3/19), increasing lactatemia to 6.7 today (8/8/19) from 4.9 on 8/4/19 in addition to experiencing an RRT today (8/8/19) for near syncope while getting out of bed with improvement of LOC when placed in bed oriented x3 TREVIÑO well, however with slight Left facial droop. CT head did not demonstrate any acute change. Course further c/b  NSTEMI as demonstrated by Lab work from RRT with elevated troponin 22.8, pt without acute ECG changes.       Consult and admission to ICU for NSTEMI and possible recurrent ischemic bowel as demonstrated by elevated lactate        PAST MEDICAL & SURGICAL HISTORY:  CAD (coronary artery disease): s/p CABG, 1995  Anginal pain  HTN (hypertension)  Enlarged prostate  H/O hemicolectomy  H/O inguinal hernia repair: bilateral  S/P TURP: x2      FAMILY HISTORY:  No pertinent family history in first degree relatives      SOCIAL HISTORY:  Smoking: [ ] Never Smoked [ ] Former Smoker (__ packs x ___ years) [ ] Current Smoker  (__ packs x ___ years)  Substance Use: [ ] Never Used [ ] Used ____  EtOH Use:  Marital Status: [ ] Single [ ]  [ ]  [ ]   Sexual History:   Occupation:  Recent Travel:  Country of Birth:  Advance Directives:    Allergies    No Known Allergies    Intolerances        HOME MEDICATIONS:    REVIEW OF SYSTEMS:  CONSTITUTIONAL: +weakness,without fevers or chills  EYES/ENT: No visual changes;  No vertigo or throat pain   NECK: No pain or stiffness  RESPIRATORY: No cough, wheezing, hemoptysis; No shortness of breath  CARDIOVASCULAR: No chest pain or palpitations  GASTROINTESTINAL: + abdominal or epigastric pain. s/p surgery No nausea, vomiting, or hematemesis; No diarrhea or constipation. No melena or hematochezia.  GENITOURINARY: No dysuria, frequency or hematuria  NEUROLOGICAL: No numbness + weakness  SKIN: No itching, rashes      OBJECTIVE:  ICU Vital Signs Last 24 Hrs  T(C): 37.2 (08 Aug 2019 19:17), Max: 37.7 (08 Aug 2019 00:18)  T(F): 99 (08 Aug 2019 19:17), Max: 99.9 (08 Aug 2019 00:18)  HR: 120 (08 Aug 2019 16:23) (102 - 120)  BP: 113/68 (08 Aug 2019 16:23) (113/68 - 148/89)  BP(mean): --  ABP: --  ABP(mean): --  RR: 17 (08 Aug 2019 16:23) (16 - 17)  SpO2: 93% (08 Aug 2019 16:23) (93% - 97%)        08-07 @ 07:01  -  08-08 @ 07:00  --------------------------------------------------------  IN: 2420 mL / OUT: 1450 mL / NET: 970 mL    08-08 @ 07:01 - 08-08 @ 19:25  --------------------------------------------------------  IN: 750 mL / OUT: 250 mL / NET: 500 mL      CAPILLARY BLOOD GLUCOSE      POCT Blood Glucose.: 128 mg/dL (08 Aug 2019 17:00)    VITAL SIGNS AT TIME OF CONSULT:  BP: 117/66; HR: 132 (sinus tach); RR: 28; SPO2 95 on 50% VM; Temp: 37.4 (PO)    PHYSICAL EXAM:  GENERAL: mild resp distress, tachycardic although denies sob or c/p, well-groomed, well-developed  HEAD:  Atraumatic, Normocephalic  EYES: PERRLA 3mm,  conjunctiva and sclera clear  ENMT: No oropharyngeal exudates, erythema or lesions,  Moist mucous membranes  NECK: Supple, no cervical lymphadenopathy, no JVD  NERVOUS SYSTEM: Alert & Oriented X3, CN II-XII intact, Moves all extremities  ; Upper extremities  4/5; Lower extremities 3-4/5, full sensation to light touch   CHEST/LUNG: Breath sounds bilaterally, able to take deep breaths spontaneously and upon command, diminished in lower lung fields, few bibasilar crackles,without rhonchi, wheezing, or rubs. POCUS anteriorly A line predominant with focal B lines, minimal bilat pleural effusion.  HEART: cardiac monitor Sinus tach with ventricular response of 130's  ; S1/S2 No murmurs, rubs, or gallops, POCUS with small pericardial effusion, diminished LVFx  ABDOMEN:  soft, tender in lower abd quads Rt>Lt, distended; Bowel sounds present, Bladder non distended, non palpable  EXTREMITIES: Pulses palpable radial pulses 2+ bilat, DP/PT 1+/1+ bilat, without clubbing, cyanosis. Digits warm to touch with good cap refill < 3 secs  SKIN: warm, dry, intact, normal color, no rash or abnormal lesions, without palpable nodes. lower extremities with 2 + edema      HOSPITAL MEDICATIONS:  MEDICATIONS  (STANDING):  enoxaparin Injectable 40 milliGRAM(s) SubCutaneous daily  finasteride 5 milliGRAM(s) Oral daily  lactated ringers Bolus 250 milliLiter(s) IV Bolus once  lactated ringers. 1000 milliLiter(s) (75 mL/Hr) IV Continuous <Continuous>  metoclopramide 10 milliGRAM(s) Oral every 4 hours  metoprolol succinate ER 50 milliGRAM(s) Oral daily  NIFEdipine XL 60 milliGRAM(s) Oral daily  tamsulosin 0.4 milliGRAM(s) Oral at bedtime    MEDICATIONS  (PRN):  acetaminophen   Tablet .. 650 milliGRAM(s) Oral every 6 hours PRN Mild Pain (1 - 3)  HYDROmorphone  Injectable 0.5 milliGRAM(s) IV Push every 4 hours PRN Severe Pain (7 - 10)  morphine  - Injectable 2 milliGRAM(s) IV Push every 4 hours PRN Moderate Pain (4 - 6)  morphine  - Injectable 1 milliGRAM(s) IV Push every 10 minutes PRN Severe Pain (7 - 10)  morphine  - Injectable 0.5 milliGRAM(s) IV Push every 10 minutes PRN Moderate Pain (4 - 6)  ondansetron Injectable 4 milliGRAM(s) IV Push every 6 hours PRN Nausea      LABS:                        10.6   7.26  )-----------( 304      ( 08 Aug 2019 17:21 )             30.9     Hgb Trend: 10.6<--, 9.5<--, 9.7<--, 8.7<--, 9.1<--  08-08    135  |  100  |  23  ----------------------------<  133<H>  4.4   |  22  |  1.20    Ca    8.4<L>      08 Aug 2019 17:21  Phos  3.1     08-08  Mg     1.9     08-08    TPro  6.1  /  Alb  2.4<L>  /  TBili  0.5  /  DBili  x   /  AST  134<H>  /  ALT  26  /  AlkPhos  58  08-08    Creatinine Trend: 1.20<--, 0.77<--, 0.59<--, 0.65<--, 0.79<--, 0.99<--            MICROBIOLOGY:     RADIOLOGY:  [ ] Reviewed and interpreted by me    EKG:        Rashi [facility] (ext. [id number])

## 2019-08-09 DIAGNOSIS — I21.4 NON-ST ELEVATION (NSTEMI) MYOCARDIAL INFARCTION: ICD-10-CM

## 2019-08-09 LAB
ANION GAP SERPL CALC-SCNC: 8 MMOL/L — SIGNIFICANT CHANGE UP (ref 5–17)
ANION GAP SERPL CALC-SCNC: 8 MMOL/L — SIGNIFICANT CHANGE UP (ref 5–17)
APPEARANCE UR: ABNORMAL
APTT BLD: 25.6 SEC — LOW (ref 28.5–37)
APTT BLD: 34.4 SEC — SIGNIFICANT CHANGE UP (ref 28.5–37)
APTT BLD: 52.5 SEC — HIGH (ref 28.5–37)
APTT BLD: 80.7 SEC — HIGH (ref 28.5–37)
BILIRUB UR-MCNC: ABNORMAL
BUN SERPL-MCNC: 26 MG/DL — HIGH (ref 7–23)
BUN SERPL-MCNC: 28 MG/DL — HIGH (ref 7–23)
CALCIUM SERPL-MCNC: 7.8 MG/DL — LOW (ref 8.5–10.1)
CALCIUM SERPL-MCNC: 8 MG/DL — LOW (ref 8.5–10.1)
CHLORIDE SERPL-SCNC: 101 MMOL/L — SIGNIFICANT CHANGE UP (ref 96–108)
CHLORIDE SERPL-SCNC: 102 MMOL/L — SIGNIFICANT CHANGE UP (ref 96–108)
CK MB BLD-MCNC: 4.8 % — HIGH (ref 0–3.5)
CK MB CFR SERPL CALC: 9.8 NG/ML — HIGH (ref 0–3.6)
CK SERPL-CCNC: 203 U/L — SIGNIFICANT CHANGE UP (ref 26–308)
CO2 SERPL-SCNC: 27 MMOL/L — SIGNIFICANT CHANGE UP (ref 22–31)
CO2 SERPL-SCNC: 27 MMOL/L — SIGNIFICANT CHANGE UP (ref 22–31)
COLOR SPEC: YELLOW — SIGNIFICANT CHANGE UP
CREAT SERPL-MCNC: 0.79 MG/DL — SIGNIFICANT CHANGE UP (ref 0.5–1.3)
CREAT SERPL-MCNC: 0.91 MG/DL — SIGNIFICANT CHANGE UP (ref 0.5–1.3)
CULTURE RESULTS: SIGNIFICANT CHANGE UP
CULTURE RESULTS: SIGNIFICANT CHANGE UP
DIFF PNL FLD: ABNORMAL
GLUCOSE SERPL-MCNC: 117 MG/DL — HIGH (ref 70–99)
GLUCOSE SERPL-MCNC: 121 MG/DL — HIGH (ref 70–99)
GLUCOSE UR QL: NEGATIVE — SIGNIFICANT CHANGE UP
HCT VFR BLD CALC: 26.9 % — LOW (ref 39–50)
HGB BLD-MCNC: 9.5 G/DL — LOW (ref 13–17)
INR BLD: 1.13 RATIO — SIGNIFICANT CHANGE UP (ref 0.88–1.16)
KETONES UR-MCNC: ABNORMAL
LACTATE SERPL-SCNC: 1 MMOL/L — SIGNIFICANT CHANGE UP (ref 0.7–2)
LACTATE SERPL-SCNC: 1.5 MMOL/L — SIGNIFICANT CHANGE UP (ref 0.7–2)
LEUKOCYTE ESTERASE UR-ACNC: ABNORMAL
MAGNESIUM SERPL-MCNC: 1.7 MG/DL — SIGNIFICANT CHANGE UP (ref 1.6–2.6)
MAGNESIUM SERPL-MCNC: 1.9 MG/DL — SIGNIFICANT CHANGE UP (ref 1.6–2.6)
MCHC RBC-ENTMCNC: 31.9 PG — SIGNIFICANT CHANGE UP (ref 27–34)
MCHC RBC-ENTMCNC: 35.3 GM/DL — SIGNIFICANT CHANGE UP (ref 32–36)
MCV RBC AUTO: 90.3 FL — SIGNIFICANT CHANGE UP (ref 80–100)
NITRITE UR-MCNC: NEGATIVE — SIGNIFICANT CHANGE UP
NRBC # BLD: 0 /100 WBCS — SIGNIFICANT CHANGE UP (ref 0–0)
PH UR: 6.5 — SIGNIFICANT CHANGE UP (ref 5–8)
PHOSPHATE SERPL-MCNC: 3.2 MG/DL — SIGNIFICANT CHANGE UP (ref 2.5–4.5)
PHOSPHATE SERPL-MCNC: 3.3 MG/DL — SIGNIFICANT CHANGE UP (ref 2.5–4.5)
PLATELET # BLD AUTO: 259 K/UL — SIGNIFICANT CHANGE UP (ref 150–400)
POTASSIUM SERPL-MCNC: 3.8 MMOL/L — SIGNIFICANT CHANGE UP (ref 3.5–5.3)
POTASSIUM SERPL-MCNC: 3.9 MMOL/L — SIGNIFICANT CHANGE UP (ref 3.5–5.3)
POTASSIUM SERPL-SCNC: 3.8 MMOL/L — SIGNIFICANT CHANGE UP (ref 3.5–5.3)
POTASSIUM SERPL-SCNC: 3.9 MMOL/L — SIGNIFICANT CHANGE UP (ref 3.5–5.3)
PROT UR-MCNC: 75 MG/DL
PROTHROM AB SERPL-ACNC: 12.8 SEC — SIGNIFICANT CHANGE UP (ref 10–12.9)
RBC # BLD: 2.98 M/UL — LOW (ref 4.2–5.8)
RBC # FLD: 15.3 % — HIGH (ref 10.3–14.5)
SODIUM SERPL-SCNC: 136 MMOL/L — SIGNIFICANT CHANGE UP (ref 135–145)
SODIUM SERPL-SCNC: 137 MMOL/L — SIGNIFICANT CHANGE UP (ref 135–145)
SP GR SPEC: 1 — LOW (ref 1.01–1.02)
SPECIMEN SOURCE: SIGNIFICANT CHANGE UP
SPECIMEN SOURCE: SIGNIFICANT CHANGE UP
TROPONIN I SERPL-MCNC: 18.8 NG/ML — HIGH (ref 0.01–0.04)
TROPONIN I SERPL-MCNC: 26.5 NG/ML — HIGH (ref 0.01–0.04)
TROPONIN I SERPL-MCNC: 34.8 NG/ML — HIGH (ref 0.01–0.04)
UROBILINOGEN FLD QL: NEGATIVE — SIGNIFICANT CHANGE UP
WBC # BLD: 5.08 K/UL — SIGNIFICANT CHANGE UP (ref 3.8–10.5)
WBC # FLD AUTO: 5.08 K/UL — SIGNIFICANT CHANGE UP (ref 3.8–10.5)

## 2019-08-09 PROCEDURE — 93010 ELECTROCARDIOGRAM REPORT: CPT

## 2019-08-09 PROCEDURE — 99024 POSTOP FOLLOW-UP VISIT: CPT

## 2019-08-09 PROCEDURE — 99291 CRITICAL CARE FIRST HOUR: CPT

## 2019-08-09 PROCEDURE — 71045 X-RAY EXAM CHEST 1 VIEW: CPT | Mod: 26

## 2019-08-09 PROCEDURE — 99233 SBSQ HOSP IP/OBS HIGH 50: CPT | Mod: GC

## 2019-08-09 PROCEDURE — 99233 SBSQ HOSP IP/OBS HIGH 50: CPT

## 2019-08-09 RX ORDER — TAMSULOSIN HYDROCHLORIDE 0.4 MG/1
1 CAPSULE ORAL
Qty: 0 | Refills: 0 | DISCHARGE

## 2019-08-09 RX ORDER — SODIUM CHLORIDE 9 MG/ML
1000 INJECTION, SOLUTION INTRAVENOUS
Refills: 0 | Status: DISCONTINUED | OUTPATIENT
Start: 2019-08-09 | End: 2019-08-09

## 2019-08-09 RX ORDER — METOPROLOL TARTRATE 50 MG
25 TABLET ORAL EVERY 8 HOURS
Refills: 0 | Status: DISCONTINUED | OUTPATIENT
Start: 2019-08-09 | End: 2019-08-10

## 2019-08-09 RX ORDER — NIFEDIPINE 30 MG
1 TABLET, EXTENDED RELEASE 24 HR ORAL
Qty: 0 | Refills: 0 | DISCHARGE

## 2019-08-09 RX ORDER — ACETAMINOPHEN 500 MG
650 TABLET ORAL EVERY 6 HOURS
Refills: 0 | Status: DISCONTINUED | OUTPATIENT
Start: 2019-08-09 | End: 2019-08-10

## 2019-08-09 RX ORDER — ATORVASTATIN CALCIUM 80 MG/1
1 TABLET, FILM COATED ORAL
Qty: 0 | Refills: 0 | DISCHARGE

## 2019-08-09 RX ORDER — FINASTERIDE 5 MG/1
1 TABLET, FILM COATED ORAL
Qty: 0 | Refills: 0 | DISCHARGE

## 2019-08-09 RX ORDER — METOCLOPRAMIDE HCL 10 MG
10 TABLET ORAL EVERY 6 HOURS
Refills: 0 | Status: DISCONTINUED | OUTPATIENT
Start: 2019-08-09 | End: 2019-08-10

## 2019-08-09 RX ORDER — ATORVASTATIN CALCIUM 80 MG/1
80 TABLET, FILM COATED ORAL AT BEDTIME
Refills: 0 | Status: DISCONTINUED | OUTPATIENT
Start: 2019-08-09 | End: 2019-08-10

## 2019-08-09 RX ORDER — FUROSEMIDE 40 MG
40 TABLET ORAL ONCE
Refills: 0 | Status: COMPLETED | OUTPATIENT
Start: 2019-08-09 | End: 2019-08-09

## 2019-08-09 RX ORDER — METOCLOPRAMIDE HCL 10 MG
10 TABLET ORAL EVERY 4 HOURS
Refills: 0 | Status: DISCONTINUED | OUTPATIENT
Start: 2019-08-09 | End: 2019-08-09

## 2019-08-09 RX ORDER — METOPROLOL TARTRATE 50 MG
25 TABLET ORAL EVERY 8 HOURS
Refills: 0 | Status: DISCONTINUED | OUTPATIENT
Start: 2019-08-09 | End: 2019-08-09

## 2019-08-09 RX ORDER — MORPHINE SULFATE 50 MG/1
1 CAPSULE, EXTENDED RELEASE ORAL EVERY 4 HOURS
Refills: 0 | Status: DISCONTINUED | OUTPATIENT
Start: 2019-08-09 | End: 2019-08-10

## 2019-08-09 RX ORDER — MORPHINE SULFATE 50 MG/1
1 CAPSULE, EXTENDED RELEASE ORAL DAILY
Refills: 0 | Status: DISCONTINUED | OUTPATIENT
Start: 2019-08-09 | End: 2019-08-09

## 2019-08-09 RX ORDER — CLOPIDOGREL BISULFATE 75 MG/1
75 TABLET, FILM COATED ORAL DAILY
Refills: 0 | Status: DISCONTINUED | OUTPATIENT
Start: 2019-08-10 | End: 2019-08-10

## 2019-08-09 RX ORDER — CLOPIDOGREL BISULFATE 75 MG/1
300 TABLET, FILM COATED ORAL ONCE
Refills: 0 | Status: COMPLETED | OUTPATIENT
Start: 2019-08-09 | End: 2019-08-09

## 2019-08-09 RX ORDER — LEVALBUTEROL 1.25 MG/.5ML
0.63 SOLUTION, CONCENTRATE RESPIRATORY (INHALATION) EVERY 6 HOURS
Refills: 0 | Status: DISCONTINUED | OUTPATIENT
Start: 2019-08-09 | End: 2019-08-10

## 2019-08-09 RX ORDER — ERYTHROMYCIN ETHYLSUCCINATE 400 MG
TABLET ORAL
Refills: 0 | Status: DISCONTINUED | OUTPATIENT
Start: 2019-08-09 | End: 2019-08-10

## 2019-08-09 RX ORDER — MAGNESIUM SULFATE 500 MG/ML
2 VIAL (ML) INJECTION ONCE
Refills: 0 | Status: DISCONTINUED | OUTPATIENT
Start: 2019-08-09 | End: 2019-08-09

## 2019-08-09 RX ORDER — ASPIRIN/CALCIUM CARB/MAGNESIUM 324 MG
1 TABLET ORAL
Qty: 0 | Refills: 0 | DISCHARGE

## 2019-08-09 RX ORDER — METOPROLOL TARTRATE 50 MG
1 TABLET ORAL
Qty: 0 | Refills: 0 | DISCHARGE

## 2019-08-09 RX ORDER — MAGNESIUM SULFATE 500 MG/ML
2 VIAL (ML) INJECTION ONCE
Refills: 0 | Status: COMPLETED | OUTPATIENT
Start: 2019-08-09 | End: 2019-08-09

## 2019-08-09 RX ORDER — MORPHINE SULFATE 50 MG/1
2 CAPSULE, EXTENDED RELEASE ORAL EVERY 6 HOURS
Refills: 0 | Status: DISCONTINUED | OUTPATIENT
Start: 2019-08-09 | End: 2019-08-10

## 2019-08-09 RX ORDER — ACETAMINOPHEN 500 MG
1 TABLET ORAL
Qty: 0 | Refills: 0 | DISCHARGE

## 2019-08-09 RX ADMIN — HEPARIN SODIUM 3800 UNIT(S): 5000 INJECTION INTRAVENOUS; SUBCUTANEOUS at 06:30

## 2019-08-09 RX ADMIN — Medication 10 MILLIGRAM(S): at 07:43

## 2019-08-09 RX ADMIN — MORPHINE SULFATE 2 MILLIGRAM(S): 50 CAPSULE, EXTENDED RELEASE ORAL at 21:46

## 2019-08-09 RX ADMIN — CLOPIDOGREL BISULFATE 300 MILLIGRAM(S): 75 TABLET, FILM COATED ORAL at 07:43

## 2019-08-09 RX ADMIN — Medication 50 MILLIGRAM(S): at 05:27

## 2019-08-09 RX ADMIN — Medication 650 MILLIGRAM(S): at 12:34

## 2019-08-09 RX ADMIN — FINASTERIDE 5 MILLIGRAM(S): 5 TABLET, FILM COATED ORAL at 12:34

## 2019-08-09 RX ADMIN — Medication 40 MILLIGRAM(S): at 22:34

## 2019-08-09 RX ADMIN — HEPARIN SODIUM 750 UNIT(S)/HR: 5000 INJECTION INTRAVENOUS; SUBCUTANEOUS at 00:22

## 2019-08-09 RX ADMIN — HEPARIN SODIUM 950 UNIT(S)/HR: 5000 INJECTION INTRAVENOUS; SUBCUTANEOUS at 06:27

## 2019-08-09 RX ADMIN — MORPHINE SULFATE 2 MILLIGRAM(S): 50 CAPSULE, EXTENDED RELEASE ORAL at 01:05

## 2019-08-09 RX ADMIN — Medication 10 MILLIGRAM(S): at 03:55

## 2019-08-09 RX ADMIN — Medication 50 GRAM(S): at 09:17

## 2019-08-09 RX ADMIN — SODIUM CHLORIDE 75 MILLILITER(S): 9 INJECTION, SOLUTION INTRAVENOUS at 05:27

## 2019-08-09 RX ADMIN — Medication 10 MILLIGRAM(S): at 12:34

## 2019-08-09 RX ADMIN — MORPHINE SULFATE 1 MILLIGRAM(S): 50 CAPSULE, EXTENDED RELEASE ORAL at 14:30

## 2019-08-09 RX ADMIN — MORPHINE SULFATE 1 MILLIGRAM(S): 50 CAPSULE, EXTENDED RELEASE ORAL at 18:18

## 2019-08-09 RX ADMIN — MORPHINE SULFATE 2 MILLIGRAM(S): 50 CAPSULE, EXTENDED RELEASE ORAL at 00:50

## 2019-08-09 RX ADMIN — HEPARIN SODIUM 900 UNIT(S)/HR: 5000 INJECTION INTRAVENOUS; SUBCUTANEOUS at 13:33

## 2019-08-09 RX ADMIN — SODIUM CHLORIDE 75 MILLILITER(S): 9 INJECTION, SOLUTION INTRAVENOUS at 00:27

## 2019-08-09 RX ADMIN — Medication 10 MILLIGRAM(S): at 17:50

## 2019-08-09 RX ADMIN — MORPHINE SULFATE 1 MILLIGRAM(S): 50 CAPSULE, EXTENDED RELEASE ORAL at 14:50

## 2019-08-09 RX ADMIN — SODIUM CHLORIDE 75 MILLILITER(S): 9 INJECTION, SOLUTION INTRAVENOUS at 21:41

## 2019-08-09 RX ADMIN — Medication 650 MILLIGRAM(S): at 17:50

## 2019-08-09 RX ADMIN — MORPHINE SULFATE 2 MILLIGRAM(S): 50 CAPSULE, EXTENDED RELEASE ORAL at 22:00

## 2019-08-09 RX ADMIN — Medication 325 MILLIGRAM(S): at 00:31

## 2019-08-09 RX ADMIN — MORPHINE SULFATE 1 MILLIGRAM(S): 50 CAPSULE, EXTENDED RELEASE ORAL at 18:40

## 2019-08-09 RX ADMIN — Medication 25 MILLIGRAM(S): at 21:46

## 2019-08-09 RX ADMIN — Medication 60 MILLIGRAM(S): at 06:51

## 2019-08-09 RX ADMIN — TAMSULOSIN HYDROCHLORIDE 0.4 MILLIGRAM(S): 0.4 CAPSULE ORAL at 21:46

## 2019-08-09 RX ADMIN — HEPARIN SODIUM 900 UNIT(S)/HR: 5000 INJECTION INTRAVENOUS; SUBCUTANEOUS at 21:39

## 2019-08-09 RX ADMIN — Medication 81 MILLIGRAM(S): at 12:34

## 2019-08-09 RX ADMIN — ATORVASTATIN CALCIUM 80 MILLIGRAM(S): 80 TABLET, FILM COATED ORAL at 21:48

## 2019-08-09 RX ADMIN — HEPARIN SODIUM 3800 UNIT(S): 5000 INJECTION INTRAVENOUS; SUBCUTANEOUS at 00:26

## 2019-08-09 NOTE — PROGRESS NOTE ADULT - SUBJECTIVE AND OBJECTIVE BOX
Patient is a 89y old  Male who presents with a chief complaint of SBO (09 Aug 2019 11:05)      INTERVAL HPI: Pt seen and examined bedside, has no complaints. Chart reviewed and last evening events noted. Pt had RR and transferred to ICU for NSTEMI,   OVERNIGHT EVENTS:  T(F): 97.6 (19 @ 08:00), Max: 99 (19 @ 19:17)  HR: 103 (19 @ 11:00) (103 - 130)  BP: 105/65 (19 @ 11:00) (94/68 - 129/72)  RR: 28 (19 @ 11:00) (17 - 36)  SpO2: 97% (19 @ 11:00) (87% - 100%)  Wt(kg): --  I&O's Summary    08 Aug 2019 07:  -  09 Aug 2019 07:00  --------------------------------------------------------  IN: 2289 mL / OUT: 555 mL / NET: 1734 mL    09 Aug 2019 07:  -  09 Aug 2019 11:54  --------------------------------------------------------  IN: 628 mL / OUT: 140 mL / NET: 488 mL        REVIEW OF SYSTEM:    Constitutional: No fever, chills, fatigue  Neuro: No headache, numbness, weakness  Resp: No cough, wheezing, shortness of breath  CVS: No chest pain, palpitations, leg swelling  GI: No abdominal pain, nausea, vomiting, diarrhea   : No dysuria, frequency, incontinence  Skin: No itching, burning, rashes, or lesions   Msk: No joint pain or swelling  Psych: No depression, anxiety, mood swings          PHYSICAL EXAM:  GENERAL: NAD, well-developed  HEAD:  Atraumatic, Normocephalic  EYES: EOMI, PERRLA, conjunctiva and sclera clear  ENMT: No tonsillar erythema, exudates, or enlargement; Moist mucous membranes,   NECK: Supple, No JVD, Normal thyroid  HEART: Regular rate and rhythm; No murmurs, rubs, or gallops  RESPIRATORY: CTA B/L, No wheezing / rhonchi  ABDOMEN: Soft, Nontender, Nondistended; Bowel sounds present  NEUROLOGY: A&Ox3, nonfocal, moving all extremities.  EXTREMITIES:  2+ Peripheral Pulses, No clubbing, cyanosis, or edema  SKIN: warm, dry, normal color, no rash or abnormal lesions        LABS:                        9.5    5.08  )-----------( 259      ( 09 Aug 2019 06:00 )             26.9     08-    137  |  102  |  28<H>  ----------------------------<  117<H>  3.9   |  27  |  0.79    Ca    7.8<L>      09 Aug 2019 06:00  Phos  3.3     08-  Mg     1.7     -    TPro  6.1  /  Alb  2.4<L>  /  TBili  0.5  /  DBili  x   /  AST  134<H>  /  ALT  26  /  AlkPhos  58  08-08    PT/INR - ( 08 Aug 2019 23:53 )   PT: 12.8 sec;   INR: 1.13 ratio         PTT - ( 09 Aug 2019 06:00 )  PTT:34.4 sec  Urinalysis Basic - ( 09 Aug 2019 01:03 )    Color: Yellow / Appearance: Slightly Turbid / S.000 / pH: x  Gluc: x / Ketone: Trace  / Bili: Small / Urobili: Negative   Blood: x / Protein: 75 mg/dL / Nitrite: Negative   Leuk Esterase: Trace / RBC: 25-50 /HPF / WBC 3-5   Sq Epi: x / Non Sq Epi: Occasional / Bacteria: Occasional      CAPILLARY BLOOD GLUCOSE      POCT Blood Glucose.: 120 mg/dL (09 Aug 2019 06:03)  POCT Blood Glucose.: 128 mg/dL (08 Aug 2019 17:00)    ABG - ( 08 Aug 2019 20:07 )  pH, Arterial: 7.49  pH, Blood: x     /  pCO2: 34    /  pO2: 102   / HCO3: 27    / Base Excess: 2.0   /  SaO2: 98                        MEDICATIONS  (STANDING):  acetaminophen   Tablet .. 650 milliGRAM(s) Oral every 6 hours  aspirin  chewable 81 milliGRAM(s) Oral daily  atorvastatin 80 milliGRAM(s) Oral at bedtime  finasteride 5 milliGRAM(s) Oral daily  heparin  Infusion.  Unit(s)/Hr (7.5 mL/Hr) IV Continuous <Continuous>  lactated ringers. 1000 milliLiter(s) (75 mL/Hr) IV Continuous <Continuous>  metoclopramide Injectable 10 milliGRAM(s) IV Push every 6 hours  metoprolol tartrate 25 milliGRAM(s) Oral every 8 hours  tamsulosin 0.4 milliGRAM(s) Oral at bedtime    MEDICATIONS  (PRN):  heparin  Injectable 3800 Unit(s) IV Push every 6 hours PRN For aPTT less than 40  morphine  - Injectable 2 milliGRAM(s) IV Push every 6 hours PRN Severe Pain (7 - 10)  ondansetron Injectable 4 milliGRAM(s) IV Push every 6 hours PRN Nausea Patient is a 89y old  Male who presents with a chief complaint of SBO (09 Aug 2019 11:05)      INTERVAL HPI: Pt seen and examined bedside, has no complaints. Chart reviewed and last evening events noted. Pt is sitting up in ICU with wife present. having clear liq diet. and denies any CP, SOB, N/V, abd pain.    OVERNIGHT EVENTS: Pt had RR and transferred to ICU for NSTEMI,     T(F): 97.6 (19 @ 08:00), Max: 99 (19 @ 19:17)  HR: 103 (19 @ 11:00) (103 - 130)  BP: 105/65 (19 @ 11:00) (94/68 - 129/72)  RR: 28 (19 @ 11:00) (17 - 36)  SpO2: 97% (19 @ 11:00) (87% - 100%)  Wt(kg): --  I&O's Summary    08 Aug 2019 07:  -  09 Aug 2019 07:00  --------------------------------------------------------  IN: 2289 mL / OUT: 555 mL / NET: 1734 mL    09 Aug 2019 07:  -  09 Aug 2019 11:54  --------------------------------------------------------  IN: 628 mL / OUT: 140 mL / NET: 488 mL        REVIEW OF SYSTEM:    Constitutional: No fever, chills, fatigue  Neuro: No headache, numbness, weakness  Resp: No cough, wheezing, shortness of breath  CVS: No chest pain, palpitations, leg swelling  GI: No abdominal pain, nausea, vomiting, diarrhea   : No dysuria, frequency, incontinence  Skin: No itching, burning, rashes, or lesions   Msk: No joint pain or swelling  Psych: No depression, anxiety, mood swings          PHYSICAL EXAM:  GENERAL: NAD, well-developed  HEAD:  Atraumatic, Normocephalic  EYES: EOMI, PERRLA, conjunctiva and sclera clear  ENMT: No tonsillar erythema, exudates, or enlargement; Moist mucous membranes,   NECK: Supple, No JVD, Normal thyroid  HEART: Regular rate and rhythm; No murmurs, rubs, or gallops  RESPIRATORY: CTA B/L, No wheezing / rhonchi  ABDOMEN: Soft, mildly tender, Nondistended; abd dressing C/D, Bowel sounds present  NEUROLOGY: A&Ox3, nonfocal, moving all extremities.  EXTREMITIES:  2+ Peripheral Pulses, No clubbing, cyanosis, or edema  SKIN: warm, dry, normal color, no rash or abnormal lesions        LABS:                        9.5    5.08  )-----------( 259      ( 09 Aug 2019 06:00 )             26.9     -    137  |  102  |  28<H>  ----------------------------<  117<H>  3.9   |  27  |  0.79    Ca    7.8<L>      09 Aug 2019 06:00  Phos  3.3     -  Mg     1.7         TPro  6.1  /  Alb  2.4<L>  /  TBili  0.5  /  DBili  x   /  AST  134<H>  /  ALT  26  /  AlkPhos  58  08-08    PT/INR - ( 08 Aug 2019 23:53 )   PT: 12.8 sec;   INR: 1.13 ratio         PTT - ( 09 Aug 2019 06:00 )  PTT:34.4 sec  Urinalysis Basic - ( 09 Aug 2019 01:03 )    Color: Yellow / Appearance: Slightly Turbid / S.000 / pH: x  Gluc: x / Ketone: Trace  / Bili: Small / Urobili: Negative   Blood: x / Protein: 75 mg/dL / Nitrite: Negative   Leuk Esterase: Trace / RBC: 25-50 /HPF / WBC 3-5   Sq Epi: x / Non Sq Epi: Occasional / Bacteria: Occasional      CAPILLARY BLOOD GLUCOSE      POCT Blood Glucose.: 120 mg/dL (09 Aug 2019 06:03)  POCT Blood Glucose.: 128 mg/dL (08 Aug 2019 17:00)    ABG - ( 08 Aug 2019 20:07 )  pH, Arterial: 7.49  pH, Blood: x     /  pCO2: 34    /  pO2: 102   / HCO3: 27    / Base Excess: 2.0   /  SaO2: 98                        MEDICATIONS  (STANDING):  acetaminophen   Tablet .. 650 milliGRAM(s) Oral every 6 hours  aspirin  chewable 81 milliGRAM(s) Oral daily  atorvastatin 80 milliGRAM(s) Oral at bedtime  finasteride 5 milliGRAM(s) Oral daily  heparin  Infusion.  Unit(s)/Hr (7.5 mL/Hr) IV Continuous <Continuous>  lactated ringers. 1000 milliLiter(s) (75 mL/Hr) IV Continuous <Continuous>  metoclopramide Injectable 10 milliGRAM(s) IV Push every 6 hours  metoprolol tartrate 25 milliGRAM(s) Oral every 8 hours  tamsulosin 0.4 milliGRAM(s) Oral at bedtime    MEDICATIONS  (PRN):  heparin  Injectable 3800 Unit(s) IV Push every 6 hours PRN For aPTT less than 40  morphine  - Injectable 2 milliGRAM(s) IV Push every 6 hours PRN Severe Pain (7 - 10)  ondansetron Injectable 4 milliGRAM(s) IV Push every 6 hours PRN Nausea

## 2019-08-09 NOTE — PROGRESS NOTE ADULT - SUBJECTIVE AND OBJECTIVE BOX
HPI: 89 yr old male with PMHx CAD s/p CABG () , BPH, HTN, bilat inguinal hernia repair, recent hospitalization at [facility] 2019 with cecal volvulus and found to have at that time ischemic and obstructed right cecum and colon requiring Rt hemicolectomy with removal of terminal ileum and ileocolostomy who originally presented this hospitalization 8/3/19 with abd pain, N/V found on CT A/P SBO with extensive mesenteric edema and ischemic bowel, pt received on 19 exlap for lysis of adhesions, reduction of internal hernia with segmental small bowel resection. (now POD 5). Called for RRT  for near syncope and brief ams while getting out of bed. When placed back in bed, mental status improved and become oriented x3. CT head performed with no acute change. Course further complicated by NSTEMI with elevated Gely with no acute ekg changes. Transferred to ICU care for NSTEMI and possible recurrent ischemic bowel given elevated lactate.     Interval events:     Review of Systems:  Constitutional: no fever, chills, fatigue  Neuro: no headache, numbness, weakness  Resp: no cough, wheezing, shortness of breath  CVS: no chest pain, palpitations, leg swelling  GI: no abdominal pain, nausea, vomiting, diarrhea   : no dysuria, frequency, incontinence  Skin: no itching, burning, rashes, or lesions   Msk: no joint pain or swelling  Psych: no depression, anxiety    T(F): 97.6 (19 @ 08:00), Max: 99 (19 @ 19:17)  HR: 109 (19 @ 08:00) (109 - 130)  BP: 129/72 (19 @ 08:00) (94/68 - 129/72)  RR: 22 (19 @ 08:00) (17 - 36)  SpO2: 97% (19 @ 08:00) (87% - 100%)  Wt(kg): --        CAPILLARY BLOOD GLUCOSE      POCT Blood Glucose.: 120 mg/dL (09 Aug 2019 06:03)    I&O's Summary    08 Aug 2019 07:01  -  09 Aug 2019 07:00  --------------------------------------------------------  IN: 2289 mL / OUT: 555 mL / NET: 1734 mL    09 Aug 2019 07:01  -  09 Aug 2019 08:45  --------------------------------------------------------  IN: 84.5 mL / OUT: 50 mL / NET: 34.5 mL        Physical Exam:     Gen:  Neuro:  HEENT:  CV:  Pulm:  GI:  Ext:  Skin:    Meds:  aspirin  chewable 81 milliGRAM(s) Oral daily  heparin  Infusion.  Unit(s)/Hr IV Continuous <Continuous>  metoprolol succinate ER 50 milliGRAM(s) Oral daily  NIFEdipine XL 60 milliGRAM(s) Oral daily  tamsulosin 0.4 milliGRAM(s) Oral at bedtime  atorvastatin 80 milliGRAM(s) Oral at bedtime  finasteride 5 milliGRAM(s) Oral daily  acetaminophen   Tablet .. 650 milliGRAM(s) Oral every 6 hours PRN  HYDROmorphone  Injectable 0.5 milliGRAM(s) IV Push every 4 hours PRN  metoclopramide Injectable 10 milliGRAM(s) IV Push every 4 hours  morphine  - Injectable 2 milliGRAM(s) IV Push every 4 hours PRN  ondansetron Injectable 4 milliGRAM(s) IV Push every 6 hours PRN  lactated ringers. 1000 milliLiter(s) IV Continuous <Continuous>          Labs                        9.5    5.08  )-----------( 259      ( 09 Aug 2019 06:00 )             26.9     Bands 10.0    08-    137  |  102  |  28<H>  ----------------------------<  117<H>  3.9   |  27  |  0.79    Ca    7.8<L>      09 Aug 2019 06:00  Phos  3.3     08-09  Mg     1.7     08-09    TPro  6.1  /  Alb  2.4<L>  /  TBili  0.5  /  DBili  x   /  AST  134<H>  /  ALT  26  /  AlkPhos  58  08-08    Lactate 1.0           - @ 06:00    Lactate 1.5            @ 23:53    Lactate 6.7            @ 17:21      CARDIAC MARKERS ( 09 Aug 2019 06:00 )  26.500 ng/mL / x     / x     / x     / x      CARDIAC MARKERS ( 08 Aug 2019 23:53 )  34.800 ng/mL / x     / x     / x     / x      CARDIAC MARKERS ( 08 Aug 2019 17:21 )  26.800 ng/mL / x     / 597 U/L / x     / 77.0 ng/mL      PT/INR - ( 08 Aug 2019 23:53 )   PT: 12.8 sec;   INR: 1.13 ratio         PTT - ( 09 Aug 2019 06:00 )  PTT:34.4 sec  Urinalysis Basic - ( 09 Aug 2019 01:03 )    Color: Yellow / Appearance: Slightly Turbid / S.000 / pH: x  Gluc: x / Ketone: Trace  / Bili: Small / Urobili: Negative   Blood: x / Protein: 75 mg/dL / Nitrite: Negative   Leuk Esterase: Trace / RBC: 25-50 /HPF / WBC 3-5   Sq Epi: x / Non Sq Epi: Occasional / Bacteria: Occasional            ABG - ( 08 Aug 2019 20:07 )  pH, Arterial: 7.49  pH, Blood: x     /  pCO2: 34    /  pO2: 102   / HCO3: 27    / Base Excess: 2.0   /  SaO2: 98                  Radiology: ***    Bedside Lung U/S: ***    Bedside Cardiac U/S: ***    CENTRAL LINE: n    BRADY: Y    A-LINE: n    GLOBAL ISSUE/BEST PRACTICE:  Analgesia: y  Sedation: n  HOB elevation: yes  Stress ulcer prophylaxis:y  VTE prophylaxis:y  Glycemic control:y  Nutrition: npo    CODE STATUS: Full HPI: 89 yr old male with PMHx CAD s/p CABG () , BPH, HTN, bilat inguinal hernia repair, recent hospitalization at [facility] 2019 with cecal volvulus and found to have at that time ischemic and obstructed right cecum and colon requiring Rt hemicolectomy with removal of terminal ileum and ileocolostomy who originally presented this hospitalization 8/3/19 with abd pain, N/V found on CT A/P SBO with extensive mesenteric edema and ischemic bowel, pt received on 19 exlap for lysis of adhesions, reduction of internal hernia with segmental small bowel resection. (now POD 5). Called for RRT  for near syncope and brief ams while getting out of bed. When placed back in bed, mental status improved and become oriented x3. CT head performed with no acute change. Course further complicated by NSTEMI with elevated Gely with no acute ekg changes. Transferred to ICU care for NSTEMI and possible recurrent ischemic bowel given elevated lactate.     Interval events: no overnight events    Review of Systems:  Constitutional: no fever, chills  Neuro: no headache, numbness, weakness  Resp: no cough, wheezing, shortness of breath  CVS: no chest pain, palpitations  GI: no abdominal pain, nausea    T(F): 97.6 (19 @ 08:00), Max: 99 (19 @ 19:17)  HR: 109 (19 @ 08:00) (109 - 130)  BP: 129/72 (19 @ 08:00) (94/68 - 129/72)  RR: 22 (19 @ 08:00) (17 - 36)  SpO2: 97% (19 @ 08:00) (87% - 100%)  Wt(kg): --        CAPILLARY BLOOD GLUCOSE      POCT Blood Glucose.: 120 mg/dL (09 Aug 2019 06:03)    I&O's Summary    08 Aug 2019 07:01  -  09 Aug 2019 07:00  --------------------------------------------------------  IN: 2289 mL / OUT: 555 mL / NET: 1734 mL    09 Aug 2019 07:01  -  09 Aug 2019 08:45  --------------------------------------------------------  IN: 84.5 mL / OUT: 50 mL / NET: 34.5 mL        Physical Exam:   General: frail elderly  HEENT: NCAT, PERRLA, EOMI bl, moist mucous membranes   Neck: Supple, nontender, no mass  Neurology: A&Ox3, nonfocal, sensation intact  Respiratory: grossly CTA B/L, No W  CV: RRR, +S1/S2, no murmurs  Abdominal: Soft, NT, ND  Extremities: No C/C/E, + 2 peripheral pulses  Skin: warm, dry, normal color      Meds:  aspirin  chewable 81 milliGRAM(s) Oral daily  heparin  Infusion.  Unit(s)/Hr IV Continuous <Continuous>  metoprolol succinate ER 50 milliGRAM(s) Oral daily  NIFEdipine XL 60 milliGRAM(s) Oral daily  tamsulosin 0.4 milliGRAM(s) Oral at bedtime  atorvastatin 80 milliGRAM(s) Oral at bedtime  finasteride 5 milliGRAM(s) Oral daily  acetaminophen   Tablet .. 650 milliGRAM(s) Oral every 6 hours PRN  HYDROmorphone  Injectable 0.5 milliGRAM(s) IV Push every 4 hours PRN  metoclopramide Injectable 10 milliGRAM(s) IV Push every 4 hours  morphine  - Injectable 2 milliGRAM(s) IV Push every 4 hours PRN  ondansetron Injectable 4 milliGRAM(s) IV Push every 6 hours PRN  lactated ringers. 1000 milliLiter(s) IV Continuous <Continuous>          Labs                        9.5    5.08  )-----------( 259      ( 09 Aug 2019 06:00 )             26.9     Bands 10.0        137  |  102  |  28<H>  ----------------------------<  117<H>  3.9   |  27  |  0.79    Ca    7.8<L>      09 Aug 2019 06:00  Phos  3.3     08  Mg     1.7     08    TPro  6.1  /  Alb  2.4<L>  /  TBili  0.5  /  DBili  x   /  AST  134<H>  /  ALT  26  /  AlkPhos  58      Lactate 1.0            @ 06:00    Lactate 1.5            @ 23:53    Lactate 6.7            @ 17:21      CARDIAC MARKERS ( 09 Aug 2019 06:00 )  26.500 ng/mL / x     / x     / x     / x      CARDIAC MARKERS ( 08 Aug 2019 23:53 )  34.800 ng/mL / x     / x     / x     / x      CARDIAC MARKERS ( 08 Aug 2019 17:21 )  26.800 ng/mL / x     / 597 U/L / x     / 77.0 ng/mL      PT/INR - ( 08 Aug 2019 23:53 )   PT: 12.8 sec;   INR: 1.13 ratio         PTT - ( 09 Aug 2019 06:00 )  PTT:34.4 sec  Urinalysis Basic - ( 09 Aug 2019 01:03 )    Color: Yellow / Appearance: Slightly Turbid / S.000 / pH: x  Gluc: x / Ketone: Trace  / Bili: Small / Urobili: Negative   Blood: x / Protein: 75 mg/dL / Nitrite: Negative   Leuk Esterase: Trace / RBC: 25-50 /HPF / WBC 3-5   Sq Epi: x / Non Sq Epi: Occasional / Bacteria: Occasional        ABG - ( 08 Aug 2019 20:07 )  pH, Arterial: 7.49  pH, Blood: x     /  pCO2: 34    /  pO2: 102   / HCO3: 27    / Base Excess: 2.0   /  SaO2: 98            CENTRAL LINE: n    BRADY: Y    A-LINE: n    GLOBAL ISSUE/BEST PRACTICE:  Analgesia: y  Sedation: n  HOB elevation: yes  Stress ulcer prophylaxis: y  VTE prophylaxis: y  Glycemic control: y  Nutrition: npo    CODE STATUS: Full

## 2019-08-09 NOTE — PROGRESS NOTE ADULT - ASSESSMENT
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  8/8 RRT called for an episode of presyncope/syncope, now found to have NSTEMI with rising lactic acidosis.  Unclear if NSTEMI is the primary event or secondary to an underlying cause which is causing the lactic acidosis.       Bedside echo shows reduced LV function, A line predominant, with trace effusions b/l and a fluid filled stomach.      Neuro: stable no active issues, mentating well  Pain: tylenol, morphine, Dilaudid prn for postop pain control  CV: NSTEMI - CE downtrending, no acute EKG changes at time of RRT, continue heparin gtt for 48hrs per cardio, Plavix loaded, continue asa and Plavix 75qd, continue statin and metoprolol. HD stable but bp soft, can hold procardia, restart as bp permits.  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  8/8 RRT called for an episode of presyncope/syncope, now found to have NSTEMI with rising lactic acidosis.  Unclear if NSTEMI is the primary event or secondary to an underlying cause which is causing the lactic acidosis.     Neuro: stable no active issues, mentating well  Pain: tylenol q6 for 24 hrs, morphine prn  CV: NSTEMI - CE downtrending, no acute EKG changes this morning, continue heparin gtt for 48hrs per cardio, Plavix loaded, continue asa and Plavix 75qd, continue statin, continue metoprolol 25mg q8. HD stable but bp soft, can hold procardia, restart as bp permits. 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  8/8 RRT called for an episode of presyncope/syncope, now found to have NSTEMI with rising lactic acidosis.  Unclear if NSTEMI is the primary event or secondary to an underlying cause which is causing the lactic acidosis.     Neuro: stable no active issues, mentating well  Pain: tylenol q6 for 24 hrs, morphine prn  CV: NSTEMI - CE downtrending, no acute EKG changes this morning, continue heparin gtt for 48hrs per cardio, Plavix loaded, continue asa and Plavix 75qd, continue statin, continue metoprolol 25mg q8. HD stable but bp soft, can hold procardia, restart as bp permits. TTE performed, pending official read  Pulm: stable resp status, tolerating NC well, keep o2 sat >90%  GI: Advance to clear liquids, standing Reglan with zofran prn  Renal/lytes: jerry resolved/lytes, continue LR IVF 75cc/hr for 48 hrs, continue Flomax and finasteride.  ID: blood cultures pending, monitoring off abx for now  heme: stable  PT eval/treat  Dispo: guarded

## 2019-08-09 NOTE — PHYSICAL THERAPY INITIAL EVALUATION ADULT - PERTINENT HX OF CURRENT PROBLEM, REHAB EVAL
Pt is 89 year old male with a past medical history of abdominal surgery (right hemicolectomy, June 2019, bilateral inguinal hernia repair), CAD s/p CABG (>20 years ago), BPH, HTN and recent hospitalization for 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 He reports that he gradually developed sharp, severe pain in his abdomen around 1:00PM on day of admit which remains unchanged. Pt is 89 y.o. M with recent hospitalization for 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 He reports that he gradually developed sharp, severe pain in his abdomen around 1:00PM on day of admit which remains unchanged. Pt now s/p ex-lap, SBR and DIONE, transferred to ICU after RR for fall/AMS, elevated troponins and EKG consistent with NSTEMI.

## 2019-08-09 NOTE — PROGRESS NOTE ADULT - PROBLEM SELECTOR PROBLEM 3
Enlarged prostate

## 2019-08-09 NOTE — PHYSICAL THERAPY INITIAL EVALUATION ADULT - IMPAIRMENTS FOUND, PT EVAL
gait, locomotion, and balance/muscle strength
muscle strength/gait, locomotion, and balance/aerobic capacity/endurance

## 2019-08-09 NOTE — PHYSICAL THERAPY INITIAL EVALUATION ADULT - RANGE OF MOTION EXAMINATION, REHAB EVAL
bilateral upper extremity ROM was WNL (within normal limits)/bilateral lower extremity was ROM was WNL (within normal limits)
bilateral upper extremity ROM was WFL (within functional limits)/bilateral lower extremity ROM was WFL (within functional limits)

## 2019-08-09 NOTE — PROGRESS NOTE ADULT - ASSESSMENT
89 year old male with a past medical history of abdominal surgery (right hemicolectomy, June 2019, bilateral inguinal hernia repair), CAD s/p CABG (>20 years ago), BPH, HTN and recent hospitalization for 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         - cardiac enzyme trend and EKG consistent with NSTEMI  - Bedside TTE showed moderate systolic dysfunction   - Follow official echocardiogram  - Started on full AC with heparin overnight.  Keep PTT 60-90.  Continue drip for total treatment duration of 48 hours  - Continue aspirin 81 QD  - Start Plavix 300 STAT, followed by 75 QD to begin tomorrow  - Start atorvastatin 80 Qhs  - Increase Toprol to 100 QD  - Monitor HR and BP  - Lactate improving  - would be cautious with fluid administration to prevent volume overload  - Please continue to maintain strict I/Os, monitor daily weights, Cr, and K.      -there is no evidence of significant arrhythmia. now with ST likely reactive.        -Pt is hemodynamically stable at this time   -Monitor and replete lytes, keep K>4 and Mg >2  -other care as per ICU team

## 2019-08-09 NOTE — PHYSICAL THERAPY INITIAL EVALUATION ADULT - TRANSFER TRAINING, PT EVAL
Patient will be independent in all transfers in 1 week
All functional transfers with min A in 1 week.

## 2019-08-09 NOTE — PROGRESS NOTE ADULT - PROBLEM SELECTOR PROBLEM 1
Small bowel obstruction
NSTEMI (non-ST elevated myocardial infarction)

## 2019-08-09 NOTE — PHYSICAL THERAPY INITIAL EVALUATION ADULT - PRECAUTIONS/LIMITATIONS, REHAB EVAL
cardiac precautions/fall precautions/oxygen therapy device and L/min
fall precautions/oxygen therapy device and L/min

## 2019-08-09 NOTE — PROGRESS NOTE ADULT - ATTENDING COMMENTS
Pt seen and examined this morning.  Mentating appropriately A&Ox3  Rapid response called yesterday as pt was unsteady during transfer and may have had altered mental status.  Found at that time to have elvated Lactate and troponin.  Suggestive of NSTEMI.  Transferred to ICU.  CTA was negative for PE, found to have bilateral pleural effusions.  CT Abd also performed which really only shows post surgical changes and relatively unchanged from intra-operative findings.    Abdomen softer today, good urine output.  Incision clean and dry.  Bowel sounds present.  No Bm but states passing flatus.    Now on heparin drip for NSTEMI.  Cardiology following.  Continue clear diet as tolerated.  Awaiting return of bowel function.  Anticipate a prolonged ileus given the extent of SB manipulation and adhesiolysis.    No objection for him to be OOB to chair.  PT should evaluate once again.  Encourage incentive spirometry and good pulmonary toilet.    D/W ICU team.
Pt seen and examined.  NGT in place, minimal drainage.  Abdomen remains tender, consistent with POD 1.  Dressing clean and dry.  Given the extent of adhesiolysis and SB resection I would anticipate prolonged post operative ileus.  Continue daily abdominal examinations, await return of bowel function.  May consider resuming diet tomorrow if appropriate.
Weaver reinserted for retention.  Pt reveals similar issue following previous surgery requiring d/c to home with catheter which was removed as outpt.  Denies nausea or vomiting.  Tolerating without NGT.   Will start sips of clears  Encourage OOB  PT evaluation.  Discharge planning.  Should be amenable for discharge by weeks end.
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  Hospital course complicated yesterday by episode of syncope and found to have NSTEMI with lactic acidosis and respiratory distress.  Overnight lactate resolved and tachycardia and respiratory status markedly improved.      --postop abd pain  standing tylenol x 24h and use morphine for severe pain  --NSTEMI  cardiac enzymes now downtrending and EKG without ischemic changes  continue ASA, plavix, heparin gtt  continue lopressor and high dose statin  f/u echo  hold procardia for now so that BB may be maximized  --respiratory status improved  b/l pleural effusions but not significantly impacting respiratory status  --abd pain and distension improved today  CT a/p suggesting SBO but clinically does not have SBO, likely ileus   ok for clear diet, discussed with surgery  continue reglan and zofran prn  --mild MARIO improved  continue LR until this evening to prevent SARA  lactic acidosis resolved, unclear what etiology was  --no clear evidence of infectious process  f/u Cx but hold off on Abx  --OOB to chair today  --discussed with pt and wife  discussed with cardiology and surgery
d/w Pt and wife advanced care planning. The time spent discussing advanced care planning was separate from the remainder of the encounter. The total time spent for the encounter which is reflected below, does not include the time spent specifically discussing advanced care planning. At this time, the patient has made the decision not to be DNR/DNI

## 2019-08-09 NOTE — PROGRESS NOTE ADULT - SUBJECTIVE AND OBJECTIVE BOX
POST OPERATIVE DAY #: 6  STATUS POST:  s/p ex-lap, SBR and DIONE    SUBJECTIVE:  89 y/o M seen and examined at bedside. Overnight events/rapid response noted.  Patient with no new complaints at this time, states he has some intermittent abdominal pain. Currently tolerating clear liquid diet. Admits to passing flatus, but denies bowel movement.  Patient denies any fevers, chills, nausea, vomiting or diarrhea.    Vital Signs Last 24 Hrs  T(C): 36.4 (09 Aug 2019 08:00), Max: 37.2 (08 Aug 2019 19:17)  T(F): 97.6 (09 Aug 2019 08:00), Max: 99 (08 Aug 2019 19:17)  HR: 104 (09 Aug 2019 10:00) (104 - 130)  BP: 116/65 (09 Aug 2019 10:00) (94/68 - 129/72)  BP(mean): 84 (09 Aug 2019 10:00) (74 - 92)  RR: 27 (09 Aug 2019 10:00) (17 - 36)  SpO2: 95% (09 Aug 2019 10:00) (87% - 100%)    PHYSICAL EXAM:  GENERAL: NAD, well-developed, resting in bed comfortably   HEAD:  Atraumatic, Normocephalic  ABDOMEN: Incision clean, dry and intact without erythema or warmth. Staples in place. Abdomen soft, mild tenderness to palpation around incision site, mildly distended. +BS  NEUROLOGY: A&O x 3    I&O's Summary    08 Aug 2019 07:  -  09 Aug 2019 07:00  --------------------------------------------------------  IN: 2289 mL / OUT: 555 mL / NET: 1734 mL    09 Aug 2019 07:  -  09 Aug 2019 11:05  --------------------------------------------------------  IN: 303.5 mL / OUT: 110 mL / NET: 193.5 mL      I&O's Detail    08 Aug 2019 07:01  -  09 Aug 2019 07:00  --------------------------------------------------------  IN:    heparin  Infusion.: 64 mL    Lactated Ringers IV Bolus: 500 mL    lactated ringers.: 1125 mL    lactated ringers.: 600 mL  Total IN: 2289 mL    OUT:    Indwelling Catheter - Urethral: 555 mL  Total OUT: 555 mL    Total NET: 1734 mL      09 Aug 2019 07:  -  09 Aug 2019 11:05  --------------------------------------------------------  IN:    heparin  Infusion.: 28.5 mL    lactated ringers.: 75 mL    lactated ringers.: 150 mL    Solution: 50 mL  Total IN: 303.5 mL    OUT:    Indwelling Catheter - Urethral: 110 mL  Total OUT: 110 mL    Total NET: 193.5 mL        MEDICATIONS  (STANDING):  acetaminophen   Tablet .. 650 milliGRAM(s) Oral every 6 hours  aspirin  chewable 81 milliGRAM(s) Oral daily  atorvastatin 80 milliGRAM(s) Oral at bedtime  finasteride 5 milliGRAM(s) Oral daily  heparin  Infusion.  Unit(s)/Hr (7.5 mL/Hr) IV Continuous <Continuous>  lactated ringers. 1000 milliLiter(s) (75 mL/Hr) IV Continuous <Continuous>  metoclopramide Injectable 10 milliGRAM(s) IV Push every 6 hours  metoprolol tartrate 25 milliGRAM(s) Oral every 8 hours  tamsulosin 0.4 milliGRAM(s) Oral at bedtime    MEDICATIONS  (PRN):  heparin  Injectable 3800 Unit(s) IV Push every 6 hours PRN For aPTT less than 40  morphine  - Injectable 2 milliGRAM(s) IV Push every 6 hours PRN Severe Pain (7 - 10)  ondansetron Injectable 4 milliGRAM(s) IV Push every 6 hours PRN Nausea    LABS:                        9.5    5.08  )-----------( 259      ( 09 Aug 2019 06:00 )             26.9     08-09    137  |  102  |  28<H>  ----------------------------<  117<H>  3.9   |  27  |  0.79    Ca    7.8<L>      09 Aug 2019 06:00  Phos  3.3     08-09  Mg     1.7     08-09    TPro  6.1  /  Alb  2.4<L>  /  TBili  0.5  /  DBili  x   /  AST  134<H>  /  ALT  26  /  AlkPhos  58  08-08    PT/INR - ( 08 Aug 2019 23:53 )   PT: 12.8 sec;   INR: 1.13 ratio         PTT - ( 09 Aug 2019 06:00 )  PTT:34.4 sec  Urinalysis Basic - ( 09 Aug 2019 01:03 )    Color: Yellow / Appearance: Slightly Turbid / S.000 / pH: x  Gluc: x / Ketone: Trace  / Bili: Small / Urobili: Negative   Blood: x / Protein: 75 mg/dL / Nitrite: Negative   Leuk Esterase: Trace / RBC: 25-50 /HPF / WBC 3-5   Sq Epi: x / Non Sq Epi: Occasional / Bacteria: Occasional      ASSESSMENT  89 y/o M POD 6 s/p ex-lap, SBR and DIONE, +F, no BM, currently in ICU after rapid response for fall/AMS, elevated troponins and EKG consistent with NSTEMI, lactate improved from 6.7 to 1.0    PLAN  - care as per ICU team  - incentive spirometer  - pain control, supportive care  - OOB  - CLD, IVF   - continue anticoagulation  - serial abdominal exams  - labs in AM  -Case discussed with Dr. Rooney    Surgical Team Contact Information  Spectralink: Ext: [phone] or [phone]  Pager: [phone]

## 2019-08-09 NOTE — PROGRESS NOTE ADULT - PROBLEM SELECTOR PLAN 4
continue on lopressor/nifedapine

## 2019-08-09 NOTE — PROVIDER CONTACT NOTE (EICU) - ACTION/TREATMENT ORDERED:
-patient is tachypneic, just received lasix, cxr with worsening vaular congestion  -will start BiPAP now to help alleviate patient's work of breathing, check ABG 45 minutes after BiPAP started.

## 2019-08-09 NOTE — PHYSICAL THERAPY INITIAL EVALUATION ADULT - BED MOBILITY LIMITATIONS, REHAB EVAL
decreased ability to use legs for bridging/pushing
decreased ability to use arms for pushing/pulling/decreased ability to use legs for bridging/pushing/impaired ability to control trunk for mobility

## 2019-08-09 NOTE — PROGRESS NOTE ADULT - PROBLEM SELECTOR PROBLEM 2
CAD (coronary artery disease)
Small bowel obstruction

## 2019-08-09 NOTE — PROGRESS NOTE ADULT - PROBLEM SELECTOR PLAN 3
c/w flomax and proscar.  Lidocaine at the meatus
flomax
flomax and proscar.
c/w flomax and proscar.  Lidocaine at the meatus

## 2019-08-09 NOTE — PHYSICAL THERAPY INITIAL EVALUATION ADULT - CRITERIA FOR SKILLED THERAPEUTIC INTERVENTIONS
impairments found/functional limitations in following categories/rehab potential
impairments found/functional limitations in following categories/rehab potential/predicted duration of therapy intervention/risk reduction/prevention/therapy frequency/anticipated discharge recommendation

## 2019-08-09 NOTE — PHYSICAL THERAPY INITIAL EVALUATION ADULT - ADDITIONAL COMMENTS
Patient lives in private home + [place] with railings and 1 full flight to second floor.  Pt was independent in all ADLs and ambulates independently without device, has both tub shower and shower stall.
Pt states he lives with wife in 2LH with 1STE with HR, full staircase inside with single HR, denies having used AD and was fully independent functionally and with ADL's prior to hospital admission.

## 2019-08-09 NOTE — PROGRESS NOTE ADULT - PROBLEM SELECTOR PLAN 1
extesnive diffuse adhesions with ischemic necrosis of small bowel, s/p open lysis of intenstinal adhesions with small bowel resection   continue npo  iv fluids  continue on ancef
s/p exp laparotomy, POD#4  extesnive diffuse adhesions with ischemic necrosis of small bowel, s/p open lysis of intenstinal adhesions with small bowel resection  pain is controlled with pain meds, has mild nausea.   Pt and ambulation.  Incentive spirometry.  Bowel regime as per Sx
extesnive diffuse adhesions with ischemic necrosis of small bowel, s/p open lysis of intenstinal adhesions with small bowel resection   NG tube came out  trial of clears
extesnive diffuse adhesions with ischemic necrosis of small bowel, s/p open lysis of intenstinal adhesions with small bowel resection   continue npo  iv fluids  continue on ancef
s/p exp laparotomy, POD#3  extesnive diffuse adhesions with ischemic necrosis of small bowel, s/p open lysis of intenstinal adhesions with small bowel resection  pain is controlled with pain meds, has mild nausea.   Had BM and tolerating po diet.  Pt and ambulation.  Incentive spirometry.
Pt Has hx of CAD and stents in past.  Now CP free and trops trending down.  C/W Heparin for A/C and with ASA, Statins and BB  Follow with cardio recomm.

## 2019-08-09 NOTE — PROGRESS NOTE ADULT - SUBJECTIVE AND OBJECTIVE BOX
89 y/o M with a h/o CAD s/p CABG (1995), BPH, HTN, bilat inguinal hernia repair, recent hospitalization at [facility] 7/2019 with cecal volvulus requiring R hemicolectomy with removal of terminal ileum and ileocolostomy, admitted on 8/3/19 with abd pain and n/v. CT-imaging revealed extensive mesenteric edema and ischemic bowel for which he underwent exp-lap for lysis of adhesions, reduction of internal hernia with segmental small bowel resection.    Transferred to ICU after RRT for syncopal event while getting out of bed. Worsening lactemia (6.7), lower abdominal pain, MARIO, oliguria. Trop elevated to 26. CT head neg for acute events.    Patient sinus tachycardic (120s) and tachypneic on 50% VM.    Concern for ongoing hypoperfusion- recurrent ischemic bowel? NSTEMI? BP on softer side but stable. Does not appear septic- WBC normal, afebrile, blood and urine cultures neg for growth.    Sent for CTA C/A/P which revealed large b/l pleural effusions, enteritis, diffuse small bowel dilation with questionable distal SBO, although discussed findings with surgeon who does not believe this to be an SBO.    Unclear what is driving worsening lactemia.    Will load patient with ASA 325mg and start ACS protocol heparin infusion for large NSTEMI (cleared with surgeon). Trops 26 --> 34. No reports of chest pain. Moderate systolic dysfunction seen on bedside POCUS earlier.    Given IVF bolus for oliguria. Will consider albumin as well given evidence of diffuse third-spacing.      Case discussed in detail with ICU attending, Dr. Juan, and surgeon, Dr. Rooney.    Total critical care time spent on encounter: 52 mins 89 y/o M with a h/o CAD s/p CABG (1995), BPH, HTN, bilat inguinal hernia repair, recent hospitalization at [facility] 7/2019 with cecal volvulus requiring R hemicolectomy with removal of terminal ileum and ileocolostomy, admitted on 8/3/19 with abd pain and n/v. CT-imaging revealed SBO, extensive mesenteric edema and ischemic bowel for which he underwent exp-lap for lysis of adhesions, reduction of internal hernia with segmental small bowel resection.    Transferred to ICU after RRT for syncopal event while getting out of bed. Worsening lactemia (6.7), lower abdominal pain, MARIO, oliguria. Trop elevated to 26. CT head neg for acute events.    Patient sinus tachycardic (120s) and tachypneic on 50% VM.    Concern for ongoing hypoperfusion- recurrent ischemic bowel? NSTEMI? BP on softer side but stable. Does not appear septic- WBC normal, afebrile, blood and urine cultures neg for growth.    Sent for CTA C/A/P which revealed large b/l pleural effusions, enteritis, diffuse small bowel dilation with questionable distal SBO, although discussed findings with surgeon who does not believe this to be an SBO.    Unclear what is driving worsening lactemia.    Will load patient with ASA 325mg and start ACS protocol heparin infusion for large NSTEMI (cleared with surgeon). Trops 26 --> 34. No reports of chest pain. Moderate systolic dysfunction seen on bedside POCUS earlier.    Given IVF bolus for oliguria. Will consider albumin as well given evidence of diffuse third-spacing.      Case discussed in detail with ICU attending, Dr. Juan, and surgeon, Dr. Rooney.    Total critical care time spent on encounter: 52 mins

## 2019-08-09 NOTE — PHYSICAL THERAPY INITIAL EVALUATION ADULT - PLANNED THERAPY INTERVENTIONS, PT EVAL
gait training/transfer training/balance training/bed mobility training
bed mobility training/balance training/gait training/transfer training

## 2019-08-09 NOTE — PHYSICAL THERAPY INITIAL EVALUATION ADULT - GAIT TRAINING, PT EVAL
Patient will ambulate x 150 feet with RW independently in 1 week
Ambulate 20ft with RW and mod A in 1 week.

## 2019-08-09 NOTE — PHYSICAL THERAPY INITIAL EVALUATION ADULT - GENERAL OBSERVATIONS, REHAB EVAL
Pt reclined in chair with supplemental O2, BP cuff, pulse ox, wade catheter and telemonitor in place in NAD.
Patient received seated in chair, cooperative with physical therapy evaluation

## 2019-08-09 NOTE — CHART NOTE - NSCHARTNOTEFT_GEN_A_CORE
Assessment:  Pt seen for nutrition follow up.  Remains NPO at time post RR 8/8. Diet just advanced to Clear Liquids now.   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  8/8 RRT called for an episode of presyncope/syncope, now found to have NSTEMI with rising lactic acidosis.  Unclear if NSTEMI is the primary event or secondary to an underlying cause which is causing the lactic acidosis.     Factors impacting intake: [x] none [ ] nausea  [ ] vomiting [ ] diarrhea [ ] constipation  [ ]chewing problems [ ] swallowing issues   [ ] other:     Diet Presciption: Diet, Clear Liquid (08-09-19 @ 09:56)    Intake: n/a    Current Weight: 8/7 157.4#, 8/9 162.9#    Pertinent Medications: MEDICATIONS  (STANDING):  acetaminophen   Tablet .. 650 milliGRAM(s) Oral every 6 hours  aspirin  chewable 81 milliGRAM(s) Oral daily  atorvastatin 80 milliGRAM(s) Oral at bedtime  finasteride 5 milliGRAM(s) Oral daily  heparin  Infusion.  Unit(s)/Hr (7.5 mL/Hr) IV Continuous <Continuous>  lactated ringers. 1000 milliLiter(s) (75 mL/Hr) IV Continuous <Continuous>  metoclopramide Injectable 10 milliGRAM(s) IV Push every 6 hours  metoprolol tartrate 25 milliGRAM(s) Oral every 8 hours  tamsulosin 0.4 milliGRAM(s) Oral at bedtime    MEDICATIONS  (PRN):  heparin  Injectable 3800 Unit(s) IV Push every 6 hours PRN For aPTT less than 40  morphine  - Injectable 2 milliGRAM(s) IV Push every 6 hours PRN Severe Pain (7 - 10)  ondansetron Injectable 4 milliGRAM(s) IV Push every 6 hours PRN Nausea    Pertinent Labs: 08-09 Na137 mmol/L Glu 117 mg/dL<H> K+ 3.9 mmol/L Cr  0.79 mg/dL BUN 28 mg/dL<H> 08-09 Phos 3.3 mg/dL 08-08 Alb 2.4 g/dL<L>     CAPILLARY BLOOD GLUCOSE      POCT Blood Glucose.: 120 mg/dL (09 Aug 2019 06:03)  POCT Blood Glucose.: 128 mg/dL (08 Aug 2019 17:00)    Skin: abdominal wound, no pressure injuries  Edema: 1+ general, 2+ R/L feet     Estimated Needs:   [x] no change since previous assessment  [ ] recalculated:     Previous Nutrition Diagnosis:     [x] Altered GI Function [x] Unintended Weight Loss     Nutrition Diagnosis is [x] ongoing  [ ] resolved [ ] not applicable     New Nutrition Diagnosis: [x] not applicable       Interventions:   Recommend  [x] Change Diet To:  Clear Liquids with Ensure Clear tid, to advance to DASH/TLC, low fiber with Ensure Enlive 8oz bid  [ ] Nutrition Supplement  [ ] Nutrition Support  [x] Other: daily MVI to prevent micronutrient deficiencies    Monitoring and Evaluation:   [x] PO intake [ x ] Tolerance to diet prescription [ x ] weights [ x ] labs[ x ] follow up per protocol  [x] other: bowel function

## 2019-08-09 NOTE — PROGRESS NOTE ADULT - SUBJECTIVE AND OBJECTIVE BOX
[facility] - Nicole Mayer, Radha, Zachery, Lucinda, Jen Payne  Office Number:  [phone]    Patient resting comfortably in bed in NAD.  Laying flat with no respiratory distress.  No complaints of chest pain, dyspnea, palpitations, PND, or orthopnea.  Overnight lactate cleared.  Started on a heparin drip.    F/U for:  NSTEMI    Telemetry:  Sinus tachycardia    MEDICATIONS  (STANDING):  aspirin  chewable 81 milliGRAM(s) Oral daily  finasteride 5 milliGRAM(s) Oral daily  heparin  Infusion.  Unit(s)/Hr (7.5 mL/Hr) IV Continuous <Continuous>  lactated ringers. 1000 milliLiter(s) (75 mL/Hr) IV Continuous <Continuous>  metoclopramide Injectable 10 milliGRAM(s) IV Push every 4 hours  metoprolol succinate ER 50 milliGRAM(s) Oral daily  NIFEdipine XL 60 milliGRAM(s) Oral daily  tamsulosin 0.4 milliGRAM(s) Oral at bedtime    MEDICATIONS  (PRN):  acetaminophen   Tablet .. 650 milliGRAM(s) Oral every 6 hours PRN Mild Pain (1 - 3)  heparin  Injectable 3800 Unit(s) IV Push every 6 hours PRN For aPTT less than 40  HYDROmorphone  Injectable 0.5 milliGRAM(s) IV Push every 4 hours PRN Severe Pain (7 - 10)  morphine  - Injectable 2 milliGRAM(s) IV Push every 4 hours PRN Moderate Pain (4 - 6)  ondansetron Injectable 4 milliGRAM(s) IV Push every 6 hours PRN Nausea      Allergies    No Known Allergies          Vital Signs Last 24 Hrs  T(C): 36.5 (09 Aug 2019 04:23), Max: 37.2 (08 Aug 2019 19:17)  T(F): 97.7 (09 Aug 2019 04:23), Max: 99 (08 Aug 2019 19:17)  HR: 113 (09 Aug 2019 05:00) (102 - 130)  BP: 119/76 (09 Aug 2019 05:00) (100/63 - 126/71)  BP(mean): 91 (09 Aug 2019 05:00) (74 - 91)  RR: 23 (09 Aug 2019 05:00) (16 - 36)  SpO2: 100% (09 Aug 2019 05:00) (87% - 100%)    I&O's Summary    07 Aug 2019 07:01  -  08 Aug 2019 07:00  --------------------------------------------------------  IN: 2420 mL / OUT: 1450 mL / NET: 970 mL    08 Aug 2019 07:01  -  09 Aug 2019 06:00  --------------------------------------------------------  IN: 2202.5 mL / OUT: 500 mL / NET: 1702.5 mL        ON EXAM:    General: NAD, awake and alert, oriented x 3  HEENT: Mucous membranes are moist, anicteric  Lungs: Non-labored, breath sounds are clear bilaterally, No wheezing, rales or rhonchi  Cardiovascular: Regular, S1 and S2, no murmurs, rubs, or gallops  Gastrointestinal: Bowel Sounds present, soft, minimally tender  Lymph: No peripheral edema. No lymphadenopathy.  Skin: No rashes or ulcers  Psych:  Mood & affect appropriate    LABS: All Labs Reviewed:                        10.6   7.26  )-----------( 304      ( 08 Aug 2019 17:21 )             30.9                         9.5    6.66  )-----------( 248      ( 08 Aug 2019 05:59 )             27.4                         9.7    5.20  )-----------( 214      ( 07 Aug 2019 06:58 )             27.4     08 Aug 2019 23:53    136    |  101    |  26     ----------------------------<  121    3.8     |  27     |  0.91   08 Aug 2019 17:21    135    |  100    |  23     ----------------------------<  133    4.4     |  22     |  1.20   08 Aug 2019 05:59    138    |  100    |  19     ----------------------------<  120    3.7     |  29     |  0.77     Ca    8.0        08 Aug 2019 23:53  Ca    8.4        08 Aug 2019 17:21  Ca    8.2        08 Aug 2019 05:59  Phos  3.2       08 Aug 2019 23:53  Phos  3.1       08 Aug 2019 17:21  Mg     1.9       08 Aug 2019 23:53  Mg     1.9       08 Aug 2019 17:21    TPro  6.1    /  Alb  2.4    /  TBili  0.5    /  DBili  x      /  AST  134    /  ALT  26     /  AlkPhos  58     08 Aug 2019 17:21    PT/INR - ( 08 Aug 2019 23:53 )   PT: 12.8 sec;   INR: 1.13 ratio         PTT - ( 08 Aug 2019 23:53 )  PTT:25.6 sec  CARDIAC MARKERS ( 08 Aug 2019 23:53 )  34.800 ng/mL / x     / x     / x     / x      CARDIAC MARKERS ( 08 Aug 2019 17:21 )  26.800 ng/mL / x     / 597 U/L / x     / 77.0 ng/mL

## 2019-08-09 NOTE — PROGRESS NOTE ADULT - PROBLEM SELECTOR PLAN 2
s/p exp laparotomy, POD#4  extesnive diffuse adhesions with ischemic necrosis of small bowel, s/p open lysis of intenstinal adhesions with small bowel resection  pain is controlled with pain meds, has mild nausea.   Pt and ambulation.  Incentive spirometry.  Bowel regime as per Sx s/p exp laparotomy,  found to have acute ischemic bowel. and extesnive diffuse adhesions with ischemic necrosis of small bowel,   s/p open lysis of intenstinal adhesions with small bowel resection  pain is controlled with pain meds, has mild nausea.   Pt and ambulation.  Incentive spirometry.  Bowel regime as per Sx

## 2019-08-09 NOTE — PHYSICAL THERAPY INITIAL EVALUATION ADULT - PATIENT/FAMILY/SIGNIFICANT OTHER GOALS STATEMENT, PT EVAL
No goals stated, pt open to SHABBIR if needed at d/c.
Patient wishes to return home when appropriate

## 2019-08-10 VITALS — OXYGEN SATURATION: 54 %

## 2019-08-10 LAB
BASE EXCESS BLDA CALC-SCNC: -5.1 MMOL/L — LOW (ref -2–2)
BLOOD GAS COMMENTS ARTERIAL: SIGNIFICANT CHANGE UP
CULTURE RESULTS: NO GROWTH — SIGNIFICANT CHANGE UP
HCO3 BLDA-SCNC: 21 MMOL/L — LOW (ref 23–27)
HOROWITZ INDEX BLDA+IHG-RTO: 40 — SIGNIFICANT CHANGE UP
PCO2 BLDA: 28 MMHG — LOW (ref 32–46)
PH BLDA: 7.43 — SIGNIFICANT CHANGE UP (ref 7.35–7.45)
PO2 BLDA: 94 MMHG — SIGNIFICANT CHANGE UP (ref 74–108)
SAO2 % BLDA: 97 % — HIGH (ref 92–96)
SPECIMEN SOURCE: SIGNIFICANT CHANGE UP

## 2019-08-10 PROCEDURE — 80053 COMPREHEN METABOLIC PANEL: CPT

## 2019-08-10 PROCEDURE — 36600 WITHDRAWAL OF ARTERIAL BLOOD: CPT

## 2019-08-10 PROCEDURE — 87086 URINE CULTURE/COLONY COUNT: CPT

## 2019-08-10 PROCEDURE — 74177 CT ABD & PELVIS W/CONTRAST: CPT

## 2019-08-10 PROCEDURE — 82550 ASSAY OF CK (CPK): CPT

## 2019-08-10 PROCEDURE — 36415 COLL VENOUS BLD VENIPUNCTURE: CPT

## 2019-08-10 PROCEDURE — 82553 CREATINE MB FRACTION: CPT

## 2019-08-10 PROCEDURE — 83605 ASSAY OF LACTIC ACID: CPT

## 2019-08-10 PROCEDURE — 94640 AIRWAY INHALATION TREATMENT: CPT

## 2019-08-10 PROCEDURE — 80048 BASIC METABOLIC PNL TOTAL CA: CPT

## 2019-08-10 PROCEDURE — 97116 GAIT TRAINING THERAPY: CPT

## 2019-08-10 PROCEDURE — C1889: CPT

## 2019-08-10 PROCEDURE — 88307 TISSUE EXAM BY PATHOLOGIST: CPT

## 2019-08-10 PROCEDURE — 97161 PT EVAL LOW COMPLEX 20 MIN: CPT

## 2019-08-10 PROCEDURE — 85027 COMPLETE CBC AUTOMATED: CPT

## 2019-08-10 PROCEDURE — 70450 CT HEAD/BRAIN W/O DYE: CPT

## 2019-08-10 PROCEDURE — 96361 HYDRATE IV INFUSION ADD-ON: CPT

## 2019-08-10 PROCEDURE — 82150 ASSAY OF AMYLASE: CPT

## 2019-08-10 PROCEDURE — 99285 EMERGENCY DEPT VISIT HI MDM: CPT | Mod: 25

## 2019-08-10 PROCEDURE — 87040 BLOOD CULTURE FOR BACTERIA: CPT

## 2019-08-10 PROCEDURE — 97530 THERAPEUTIC ACTIVITIES: CPT

## 2019-08-10 PROCEDURE — 86901 BLOOD TYPING SEROLOGIC RH(D): CPT

## 2019-08-10 PROCEDURE — 83690 ASSAY OF LIPASE: CPT

## 2019-08-10 PROCEDURE — 84100 ASSAY OF PHOSPHORUS: CPT

## 2019-08-10 PROCEDURE — 86850 RBC ANTIBODY SCREEN: CPT

## 2019-08-10 PROCEDURE — 94660 CPAP INITIATION&MGMT: CPT

## 2019-08-10 PROCEDURE — 85730 THROMBOPLASTIN TIME PARTIAL: CPT

## 2019-08-10 PROCEDURE — 82803 BLOOD GASES ANY COMBINATION: CPT

## 2019-08-10 PROCEDURE — 71275 CT ANGIOGRAPHY CHEST: CPT

## 2019-08-10 PROCEDURE — 86900 BLOOD TYPING SEROLOGIC ABO: CPT

## 2019-08-10 PROCEDURE — 74176 CT ABD & PELVIS W/O CONTRAST: CPT

## 2019-08-10 PROCEDURE — 74019 RADEX ABDOMEN 2 VIEWS: CPT

## 2019-08-10 PROCEDURE — 97162 PT EVAL MOD COMPLEX 30 MIN: CPT

## 2019-08-10 PROCEDURE — 82962 GLUCOSE BLOOD TEST: CPT

## 2019-08-10 PROCEDURE — 83735 ASSAY OF MAGNESIUM: CPT

## 2019-08-10 PROCEDURE — 84484 ASSAY OF TROPONIN QUANT: CPT

## 2019-08-10 PROCEDURE — 71045 X-RAY EXAM CHEST 1 VIEW: CPT

## 2019-08-10 PROCEDURE — 85610 PROTHROMBIN TIME: CPT

## 2019-08-10 PROCEDURE — 93306 TTE W/DOPPLER COMPLETE: CPT

## 2019-08-10 PROCEDURE — 93005 ELECTROCARDIOGRAM TRACING: CPT

## 2019-08-10 PROCEDURE — 81001 URINALYSIS AUTO W/SCOPE: CPT

## 2019-08-10 PROCEDURE — 96374 THER/PROPH/DIAG INJ IV PUSH: CPT

## 2019-08-10 RX ORDER — ERYTHROMYCIN ETHYLSUCCINATE 400 MG
500 TABLET ORAL EVERY 8 HOURS
Refills: 0 | Status: DISCONTINUED | OUTPATIENT
Start: 2019-08-10 | End: 2019-08-10

## 2019-08-10 RX ORDER — ERYTHROMYCIN ETHYLSUCCINATE 400 MG
500 TABLET ORAL ONCE
Refills: 0 | Status: COMPLETED | OUTPATIENT
Start: 2019-08-10 | End: 2019-08-10

## 2019-08-10 RX ADMIN — LEVALBUTEROL 0.63 MILLIGRAM(S): 1.25 SOLUTION, CONCENTRATE RESPIRATORY (INHALATION) at 00:39

## 2019-08-10 RX ADMIN — Medication 10 MILLIGRAM(S): at 01:08

## 2019-08-10 RX ADMIN — Medication 650 MILLIGRAM(S): at 01:08

## 2019-08-10 NOTE — DISCHARGE NOTE FOR THE EXPIRED PATIENT - HOSPITAL COURSE
89 yr old male with PMHx CAD s/p CABG (1995) , BPH, HTN, bilat inguinal hernia repair, recent hospitalization at [facility] 7/2019 with cecal volvulus and found to have at that time ischemic and obstructed right cecum and colon requiring Rt hemicolectomy with removal of terminal ileum and ileocolostomy who originally presented this hospitalization 8/3/19 with abd pain, N/V found on CT A/P SBO with extensive mesenteric edema and ischemic bowel, pt received on 8/4/19 exlap for lysis of adhesions, reduction of internal hernia with segmental small bowel resection. (now POD 5). Called for RRT 8/8 for near syncope and brief ams while getting out of bed. When placed back in bed, mental status improved and become oriented x3. CT head performed with no acute change. Course further complicated by NSTEMI with elevated Gely with no acute ekg changes. Transferred to ICU care for NSTEMI and possible recurrent ischemic bowel given elevated lactate. On 8/10/19 patient had aspiration event into BIPAP at with subsequent hypoxia and cardiopulmonary arrest. Patient was emergently intubated with iniatation of CPR. Initial rhythm was PEA. High quality CPR done for 38 minutes with multiple doses of epinephrine per ACLS protocol. Had monomorphic VT during compressions, defibrillation x2 and given amiodarone. Patient went back into PEA/asystole. CPR was continued. Despite maximum effort, patient was unable to be resuscitated. Bedside sonogram showed cardiac standstill. Patient was pronounced at 3:46AM on 8/10/19. Patient's wife, Pat, was called at beginning of event and came to hospital at time of pronouncement. Patient with prolonged hospital course and with daily discussions of extremely poor prognosis. Wife was comforted and is to tell rest of kin. 89 yr old male with PMHx CAD s/p CABG (1995) , BPH, HTN, bilat inguinal hernia repair, [facility]2019 with cecal volvulus and found to have at that time ischemic and obstructed right cecum and colon requiring Rt hemicolectomy with removal of terminal ileum and ileocolostomy who originally presented this hospitalization 8/3/19 with abd pain, N/V found on CT A/P SBO with extensive mesenteric edema and ischemic bowel, pt received on 8/4/19 exlap for lysis of adhesions, reduction of internal hernia with segmental small bowel resection. Called for RRT 8/8 for near syncope and brief ams while getting out of bed. When placed back in bed, mental status improved and become oriented x3. CT head performed with no acute change. Course further complicated by NSTEMI with elevated Gely with no acute ekg changes. Transferred to ICU care for NSTEMI and possible recurrent ischemic bowel given elevated lactate. On 8/10/19 patient had aspiration event into BIPAP at with subsequent hypoxia and cardiopulmonary arrest. Patient was emergently intubated with iniatation of CPR. Initial rhythm was PEA. High quality CPR done for 38 minutes with multiple doses of epinephrine per ACLS protocol. Had monomorphic VT in two instances during compressions, defibrillation x2 and given amiodarone. Patient went back into PEA/asystole. CPR was continued. Despite maximum effort, patient was unable to be resuscitated. Bedside sonogram showed cardiac standstill. Patient was pronounced at 3:48AM on 8/10/19. Patient's wife, Pat, was called at beginning of event and came to hospital at time of pronouncement. Patient with prolonged hospital course and with daily discussions of extremely poor prognosis. Wife was comforted and is to tell rest of kin. CCPA discussed with wife and surgery Dr. Rooney.    Patient had no response to any stimuli including verbal or tactile stimuli.  No spontaneous movements were present.  No papillary or corneal reflexes were seen.  No breath sounds were auscultated and no spontaneous breaths were seen.  No heart sounds were heard over the entire precordium and no pulses were palpated.    Gen: unresponsive to verbal stimuli  HEENT: pupils fixed and dilated  Cardio: absent heart sounds  Pulm: absent breath sounds  Vasc: no palpable radial, carotid or femoral pulses  Neuro: unresponsive to tactile stimuli, absent corneal reflex    Time of Death: 3:48AM on 8/10/2019  Attending Notification: Surgery Dr. Rooney and Lobito 89 yr old male with PMHx CAD s/p CABG (1995) , BPH, HTN, bilat inguinal hernia repair, recent hospitalization at [facility]"" 7/2019 with cecal volvulus and found to have at that time ischemic and obstructed right cecum and colon requiring Rt hemicolectomy with removal of terminal ileum and ileocolostomy who originally presented this hospitalization 8/3/19 with abd pain, N/V found on CT A/P SBO with extensive mesenteric edema and ischemic bowel, pt received on 8/4/19 exlap for lysis of adhesions, reduction of internal hernia with segmental small bowel resection. Called for RRT 8/8 for near syncope and brief ams while getting out of bed. When placed back in bed, mental status improved and become oriented x3. CT head performed with no acute change. Course further complicated by NSTEMI with elevated Gely with no acute ekg changes. Transferred to ICU care for NSTEMI and possible recurrent ischemic bowel given elevated lactate. On 8/10/19 patient had aspiration event into BIPAP with subsequent hypoxia and cardiopulmonary arrest. Patient was emergently intubated with iniatation of CPR. Initial rhythm was PEA. High quality CPR done for 38 minutes with multiple doses of epinephrine per ACLS protocol. Had monomorphic VT in two instances during compressions, defibrillation x2 and given amiodarone. Patient went back into PEA/asystole. CPR was continued. Despite maximum effort, patient was unable to be resuscitated. Bedside sonogram showed cardiac standstill. Patient was pronounced at 3:48AM on 8/10/19. Patient's wife, Pat Arce, was called at beginning of event and came to hospital at time of pronouncement. Patient with prolonged hospital course and with daily discussions of extremely poor prognosis. Wife was comforted and is to tell rest of kin. CCPA discussed with wife and surgery Dr. Rooney.    Patient had no response to any stimuli including verbal or tactile stimuli.  No spontaneous movements were present.  No papillary or corneal reflexes were seen.  No breath sounds were auscultated and no spontaneous breaths were seen.  No heart sounds were heard over the entire precordium and no pulses were palpated.    Gen: unresponsive to verbal stimuli  HEENT: pupils fixed and dilated  Cardio: absent heart sounds  Pulm: absent breath sounds  Vasc: no palpable radial, carotid or femoral pulses  Neuro: unresponsive to tactile stimuli, absent corneal reflex    Time of Death: 3:48AM on 8/10/2019  Attending Notification: Surgery Dr. Rooney and Lobito 89 yr old male with PMHx CAD s/p CABG (1995) , BPH, HTN, bilat inguinal hernia repair, recent hospitalization at [facility] 7/2019 with cecal volvulus and found to have at that time ischemic and obstructed right cecum and colon requiring Rt hemicolectomy with removal of terminal ileum and ileocolostomy who originally presented this hospitalization 8/3/19 with abd pain, N/V found on CT A/P SBO with extensive mesenteric edema and ischemic bowel, pt received on 8/4/19 exlap for lysis of adhesions, reduction of internal hernia with segmental small bowel resection. Called for RRT 8/8 for near syncope and brief ams while getting out of bed. When placed back in bed, mental status improved and become oriented x3. CT head performed with no acute change. Course further complicated by NSTEMI with elevated Gely with no acute ekg changes. Transferred to ICU care for NSTEMI and possible recurrent ischemic bowel given elevated lactate. On 8/10/19 patient had apneic event and lost his pulse, brief CPR (seconds) resuscitated him. He then vomited bilious material had aspiration event into BIPAP with subsequent hypoxia, prompting emergent intubation. While bagging the patient for intubation, patient became bradycardiac and again had cardiopulmonary arrest with asystole. CPR was again initiated with asystole and PEA being the primary rhythms. High quality CPR done for 38 minutes with multiple doses of epinephrine per ACLS protocol. Had monomorphic VT in two instances during CPR, defibrillation x2 and given amiodarone. Patient went back into PEA/asystole. CPR was continued. Despite maximum effort, patient was unable to be resuscitated. Bedside sonogram showed cardiac standstill. Patient was pronounced at 3:48AM on 8/10/19. Patient's wife, Pat Arce, was called at beginning of event and came to hospital at time of pronouncement. Patient with prolonged hospital course and with daily discussions of extremely poor prognosis. Wife was comforted and is to tell rest of kin. CCPA discussed with wife and surgery Dr. Rooney.    Patient had no response to any stimuli including verbal or tactile stimuli.  No spontaneous movements were present.  No papillary or corneal reflexes were seen.  No breath sounds were auscultated and no spontaneous breaths were seen.  No heart sounds were heard over the entire precordium and no pulses were palpated.    Gen: unresponsive to verbal stimuli  HEENT: pupils fixed and dilated  Cardio: absent heart sounds  Pulm: absent breath sounds  Vasc: no palpable radial, carotid or femoral pulses  Neuro: unresponsive to tactile stimuli, absent corneal reflex    Time of Death: 3:48AM on 8/10/2019  Attending Notification: Surgery Dr. Rooney and Nocturnist

## 2019-08-10 NOTE — PROGRESS NOTE ADULT - PROVIDER SPECIALTY LIST ADULT
Anesthesia
Cardiology
Critical Care
Hospitalist
Surgery
Critical Care
Hospitalist
Surgery
Critical Care
Hospitalist
Hospitalist

## 2019-08-10 NOTE — AIRWAY PLACEMENT NOTE ADULT - POST AIRWAY PLACEMENT ASSESSMENT:
breath sounds bilateral/CXR pending/breath sounds equal/chest excursion noted/positive end tidal CO2 noted

## 2019-08-10 NOTE — DISCHARGE NOTE FOR THE EXPIRED PATIENT - SECONDARY DIAGNOSIS.
NSTEMI (non-ST elevated myocardial infarction) CAD (coronary artery disease) HTN (hypertension) Small bowel obstruction Anginal pain

## 2019-08-10 NOTE — DISCHARGE NOTE FOR THE EXPIRED PATIENT - OTHER
aspiration event causing hypoxic respiratory failure and subsequent cardiopulmonary arrest in setting of NSTEMI aspiration event causing hypoxic respiratory failure and subsequent cardiopulmonary arrest in setting of complicated post-operative course with NSTEMI acute hypoxic respiratory failure and subsequent cardiopulmonary arrest in setting of complicated post-operative course with NSTEMI

## 2019-08-10 NOTE — PROCEDURE NOTE - ADDITIONAL PROCEDURE DETAILS
Inserted due to lack of peripheral IV access. Procedure performed independently of critical care time. Inserted due to lack of peripheral IV access. Patient with acute hypoxemic respiratory failure, SBO, ischemic bowel, pleural effusions. Procedure performed independently of critical care time.

## 2019-08-10 NOTE — AIRWAY PLACEMENT NOTE ADULT - AIRWAY COMMENTS:
Orally suctioned for brown watery liquid but once airway cleared and intubated lungs cta and minimal secretions suctioned by ETT

## 2019-08-10 NOTE — PROGRESS NOTE ADULT - SUBJECTIVE AND OBJECTIVE BOX
89 y/o M with a h/o CAD s/p CABG (1995), BPH, HTN, bilat inguinal hernia repair, recent hospitalization at [facility] 7/2019 with cecal volvulus requiring R hemicolectomy with removal of terminal ileum and ileocolostomy, admitted on 8/3/19 with abd pain and n/v. CT-imaging revealed SBO, extensive mesenteric edema and ischemic bowel for which he underwent exp-lap for lysis of adhesions, reduction of internal hernia with segmental small bowel resection.    Transferred to ICU on 8/8 after RRT for syncopal event while getting out of bed. Worsening lactemia (6.7), lower abdominal pain, MARIO, oliguria. Large NSTEMI. CT head neg for acute events.    Overnight with worsening tachypnea (RR: 40+). Diffuse wheezing, b/l pleural effusions, and elevated hemidiaphragms likely secondary to distended bowel/ileus. UOP dropping off. Given inhaled bronchodilator and diuresed. Placed on NIPPV for work of breathing and clinically improved over time. Ordered erythromycin for gut motility. Bedside POCUS did not reveal sizeable fluid pockets for thoracentesis, more so elevated hemidiaphragms.    Called to bedside urgently later on as patient became apneic and lost his pulse- asystole on monitor. RN began CPR and patient regained pulse and was in NSR within seconds and then vomited dark bilious material. Acute hypoxemic respiratory failure prompted intubation. During ambu-bagging in preparation for ETT placement the patient experienced progressive bradycardia and again developed asystolic cardiac arrest. CPR/ACLS initiated for ~ 38 mins total. Main rhythms throughout code were asystole/PEA with 2 runs of monomorphic VT for which he received 2 shocks (see code sheets for specific details). Cardiac standstill seen on bedside POCUS. Code stopped and patient pronounced dead by myself @ 0348 on 8/10/19.    Patient's wife came into the hospital and we spoke at length about the events that occurred. Emotional support provided.      Called patient's surgeon, Dr. Rooney, to update him.          CRITICAL CARE TIME SPENT: 58 mins  Time spent evaluating/treating patient with medical issues that pose a high risk for life threatening deterioration and/or end-organ damage, reviewing data/labs/imaging, discussing case with multidisciplinary team, discussing plan/goals of care with patient/family. Non-inclusive of procedure time. 89 y/o M with a h/o CAD s/p CABG (1995), BPH, HTN, bilat inguinal hernia repair, recent hospitalization at [facility] 7/2019 with cecal volvulus requiring R hemicolectomy with removal of terminal ileum and ileocolostomy, admitted on 8/3/19 with abd pain and n/v. CT-imaging revealed SBO, extensive mesenteric edema and ischemic bowel for which he underwent exp-lap for lysis of adhesions, reduction of internal hernia with segmental small bowel resection.    Transferred to ICU on 8/8 after RRT for syncopal event while getting out of bed. Worsening lactemia (6.7), lower abdominal pain, MARIO, oliguria. Large NSTEMI. CT head neg for acute events.    Was improving on 8/9 with medical therpay...    Overnight with worsening tachypnea (RR: 40+). Diffuse wheezing, b/l pleural effusions, and elevated hemidiaphragms likely secondary to distended bowel/ileus. UOP dropping off. Given inhaled bronchodilator and diuresed. Placed on NIPPV for work of breathing and clinically improved over time. Ordered erythromycin for gut motility. Bedside POCUS did not reveal sizeable fluid pockets for thoracentesis, more so elevated hemidiaphragms.    Called to bedside urgently later on as patient became apneic and lost his pulse- asystole on monitor. RN began CPR and patient regained pulse and was in NSR within seconds and then vomited dark bilious material. Acute hypoxemic respiratory failure prompted intubation. During ambu-bagging in preparation for ETT placement the patient experienced progressive bradycardia and again developed asystolic cardiac arrest. CPR/ACLS initiated for ~ 38 mins total. Main rhythms throughout code were asystole/PEA with 2 runs of monomorphic VT for which he received 2 shocks (see code sheets for specific details). Cardiac standstill seen on bedside POCUS. Code stopped and patient pronounced dead by myself @ 0348 on 8/10/19.    Patient's wife came into the hospital and we spoke at length about the events that occurred. Emotional support provided.      Called patient's surgeon, Dr. Rooney, to update him.          CRITICAL CARE TIME SPENT: 58 mins  Time spent evaluating/treating patient with medical issues that pose a high risk for life threatening deterioration and/or end-organ damage, reviewing data/labs/imaging, discussing case with multidisciplinary team, discussing plan/goals of care with patient/family. Non-inclusive of procedure time.

## 2019-08-14 LAB
CULTURE RESULTS: SIGNIFICANT CHANGE UP
CULTURE RESULTS: SIGNIFICANT CHANGE UP
SPECIMEN SOURCE: SIGNIFICANT CHANGE UP
SPECIMEN SOURCE: SIGNIFICANT CHANGE UP

## 2019-11-04 ENCOUNTER — APPOINTMENT (OUTPATIENT)
Dept: SURGERY | Facility: CLINIC | Age: 84
End: 2019-11-04

## 2019-11-26 NOTE — PATIENT PROFILE ADULT - NSPROGENANESREACTION_GEN_A_NUR
If you are a smoker, it is important for your health to stop smoking. Please be aware that second hand smoke is also harmful. no previous reaction

## 2020-01-07 NOTE — DISCHARGE NOTE PROVIDER - NSDCCPCAREPLAN_GEN_ALL_CORE_FT
Normal vision: sees adequately in most situations; can see medication labels, newsprint PRINCIPAL DISCHARGE DIAGNOSIS  Diagnosis: Cecal volvulus  Assessment and Plan of Treatment: was surgically treated by Dr Tse general surgery and you clinically improved, please follow up within 1-3 days of discharge for further care and management with Dr. Tse, please also see your primary medical provider within 1 week of discharge for hospital follow up care and evaluation      SECONDARY DISCHARGE DIAGNOSES  Diagnosis: Urinary retention with incomplete bladder emptying  Assessment and Plan of Treatment: you were seen by urology and your symptoms resolved, please follow up within 1 week of discharge with your primary outpatient urologist Dr Martinez Cruz

## 2020-07-19 NOTE — ED ADULT NURSE NOTE - OBJECTIVE STATEMENT
Present to Er with c/o of urinary retention. Pt states he is having trouble start urinating and it has been dribbling. Denies any chest pain or shortness of breath. Bladder scan with 287ml retained in bladder. Weaver inserted output of 200ml. Evie ANDREWS made aware. IV on right AC initiated for CMP and CBC.
no

## 2020-12-21 PROBLEM — J06.9 ACUTE URI: Status: RESOLVED | Noted: 2019-04-03 | Resolved: 2020-12-21

## 2021-03-07 NOTE — H&P ADULT - NSICDXPASTMEDICALHX_GEN_ALL_CORE_FT
PAST MEDICAL HISTORY:  Anginal pain     CAD (coronary artery disease) s/p CABG, 1995    Enlarged prostate     HTN (hypertension)
Principal Discharge DX:	Impulse control disorder  Secondary Diagnosis:	Schizoaffective disorder

## 2021-03-23 NOTE — PHYSICAL THERAPY INITIAL EVALUATION ADULT - LIVES WITH, PROFILE
[address] was seen for COVID-19 vaccination at his home as part of the [facility]. The patient was seen for dose 1 of the Moderna vaccine. The EUA was reviewed with the patient and all questions answered. The attestation was reviewed without contraindications to the immunization and the patient consented to the immunization. The patient's medications and allergies were reviewed. The patient was given the vaccine intramuscularly into the right deltoid. The patient tolerated the immunization well without obvious adverse events. The patient was observed for at least 15 minutes. At the time of leaving the home, the patient was doing well and at baseline condition. The patient was given the [facility] COVID-19 vaccination record card and instructed to keep it safe and readily available. The patient was educated regarding the potential side effects and given instructions that for lethargy, lightheadedness, difficulty breathing, or other acute reactions the patient should call 911 for evaluation in the ED.     Kati Smith MD  3/23/2021
NECK PAIN
spouse
spouse

## 2021-09-08 NOTE — PATIENT PROFILE ADULT - NSPROPTRIGHTNOTIFY_GEN_A_NUR
Gentle Skin Care Counseling: I recommended use a gentle skin cleanser when washing the skin. I also recommended application of a moisturizer twice daily. Products with fragrances, preservatives and dyes should be avoided.
Detail Level: Detailed
declines

## 2021-10-01 NOTE — H&P ADULT - PROBLEM SELECTOR PROBLEM 1
Impression: S/P Cataract Extraction by phacoemulsification with IOL placement; ORA; Goniotomy OS - 1 Day. Encounter for surgical aftercare following surgery on a sense organ  [id number].  Plan: aim near
Abdominal pain

## 2022-03-15 NOTE — ED ADULT NURSE REASSESSMENT NOTE - ABDOMEN
[address]   OPERATIVE REPORT    Name:  Santiago Lin  MR#:  [id number]  :  1958  ACCOUNT #:  [de-identified]  DATE OF SERVICE:  03/15/2022      PREOPERATIVE DIAGNOSIS:  Symptomatic cholelithiasis. POSTOPERATIVE DIAGNOSIS:  Symptomatic cholelithiasis. PROCEDURE PERFORMED:  Laparoscopic cholecystectomy. SURGEON:  Jaden Yo MD    ASSISTANT:  Joseph Cruz SA    ANESTHESIA:  General endotracheal.    COMPLICATIONS:  None. SPECIMENS REMOVED:  Gallbladder to Pathology. IMPLANTS:  None. ESTIMATED BLOOD LOSS:  Approximately 10 mL. FLUIDS:  Crystalloid 800 mL. DRAINS:  None. INDICATIONS:  The patient is a 77-year-old man with symptomatic cholelithiasis. The patient is brought to the operating room at this time for laparoscopic cholecystectomy and intraoperative cholangiogram.  The risks of the procedure, including but not limited to, bleeding, infection, injury to the common bile duct and conversion to an open procedure were discussed in detail with the patient via the blue phone . Mr. Kamala Gilman understood and wished to proceed. PROCEDURE:  After consent was obtained, the patient was brought to the operating room where he was placed in the supine position on the operating room table. Following the induction of an adequate level of general anesthesia via the endotracheal tube, compression devices were placed on both lower extremities. The abdomen was prepped with ChloraPrep and draped as a sterile field. Local anesthetic was infiltrated and a small transverse incision below the umbilicus was opened sharply. Using the 5-mm non-bladed dilating trocar, access to the peritoneal cavity was achieved under direct vision. After an adequate level of carbon dioxide pneumoperitoneum had been achieved, the laparoscope was inserted. Inspection of the peritoneal contents revealed no focal abnormalities.   Local anesthetic was infiltrated again and a 12-mm trocar and two 5 mm trocars were placed in the usual locations under direct vision. The operating table was placed in the reverse Trendelenburg position and attention directed towards the gallbladder. The gallbladder was readily identified and appeared thick-walled consistent with chronic cholecystitis. The gallbladder was grasped and attention directed towards the cystic duct. This structure was identified, dissected free circumferentially and followed into the gallbladder. The cystic artery was subsequently identified, dissected free circumferentially and followed into the gallbladder as well. The gallbladder was mobilized and the critical view was obtained. It was then decided to perform an intraoperative cholangiogram.  The Irby clamp was brought on the field and placed across the inferior aspect of the gallbladder. The catheter was inserted and the cholangiogram was performed. Extravasation of contrast was noted. Therefore, the catheter was removed and the Irby clamp was removed as well. The cystic duct was clipped once distally and an opening made with the Endo jane. The cholangiocath was inserted and secured with two clips. The cholangiogram was repeated and again extravasation of contrast was noted; therefore, the cholangiocath was removed. There appeared to be a stone impacted in the cystic duct. Attempts were made to milk out the stone. The cholangiocath was inserted again and secured with two clips. The cholangiogram was repeated, but again, extravasation of contrast was noted. Therefore, it was decided to abort attempts at cholangiography. Again, it should be noted that the critical view was obtained. The cystic duct was clipped three times proximally and divided. The cystic artery was divided between three clips proximally and one distally. Using the hook electrocautery, the gallbladder was taken off of the liver and placed in an Endo Catch bag.   The specimen was removed from the peritoneal cavity, passed off the field and submitted for histopathologic evaluation. The gallbladder fossa was inspected and several bleeders were carefully cauterized. The clips on the cystic duct stump and the clips on the cystic artery stump were identified and found to be in place. The gallbladder fossa was irrigated with saline, inspected and found to be hemostatic. The peritoneal cavity was inspected again and felt to be hemostatic. No other abnormalities were noted and so the trocars were all removed under direct vision. The pneumoperitoneum was evacuated and the wounds were irrigated with saline. The fascial defect at the 12-mm trocar site was closed with a 0 Vicryl figure-of-eight suture. This incision was closed with two interrupted 0 Vicryl sutures followed by 4-0 Monocryl subcuticular suture to the skin. The three 5-mm trocar sites were closed with 4-0 Monocryl subcuticular suture to the skin. Additional local anesthetic was infiltrated and the incisions were dressed with Dermabond. The patient was awakened from his general anesthetic and extubated in the operating room. He was transferred to the stretcher and brought to the recovery room in stable condition having tolerated the procedure well. At the conclusion of the procedure, all sponge counts, instrument counts and needle counts were reported as correct x2.       Rafael Braswell MD      [id number]  D:  03/15/2022 9:21  T:  03/15/2022 16:13  JOB #:  [id number]  CC:  Joselin Ny MD distended/tender

## 2022-11-12 NOTE — ED ADULT NURSE NOTE - CCCP TRG CHIEF CMPLNT
urinary/bladder trouble
You can access the FollowMyHealth Patient Portal offered by [facility] by registering at the following website: [email or web address]. By joining [employer]’s FollowMyHealth portal, you will also be able to view your health information using other applications (apps) compatible with our system.

## 2023-03-01 NOTE — INPATIENT CERTIFICATION FOR MEDICARE PATIENTS - THE STATUS OF COMORBIDITIES.
Detail Level: Simple Detail Level: Generalized 2. The status of comorbities. (See ED/admit documents) Detail Level: Zone

## 2023-08-17 NOTE — H&P ADULT - PROBLEM SELECTOR PLAN 5
Detail Level: Detailed Show Applicator Variable?: Yes Render Post-Care Instructions In Note?: no Consent: The patient's consent was obtained including but not limited to risks of crusting, scabbing, blistering, scarring, darker or lighter pigmentary change, recurrence, incomplete removal and infection. Number Of Freeze-Thaw Cycles: 3 freeze-thaw cycles Post-Care Instructions: I reviewed with the patient in detail post-care instructions. Patient is to wear sunprotection, and avoid picking at any of the treated lesions. Pt may apply Vaseline to crusted or scabbing areas. Duration Of Freeze Thaw-Cycle (Seconds): 2 CXR showed Small left pleural effusion.   - Pt asymptomatic, saturating well  - Continue to monitor  - IVF for hypernatremia for 12 hours.

## 2023-09-20 NOTE — DIETITIAN INITIAL EVALUATION ADULT. - NUTRITION DIAGNOSIS

## 2023-10-17 NOTE — ED ADULT TRIAGE NOTE - TEMPERATURE IN FAHRENHEIT (DEGREES F)
Have Your Skin Lesions Been Treated?: not been treated Is This A New Presentation, Or A Follow-Up?: Growth How Severe Is Your Skin Lesion?: moderate 98

## 2024-02-19 NOTE — PRE-OP CHECKLIST - WAS PATIENT ON BETA BLOCKER?
[facility]  Patient is scheduled for a colonoscopy with Dr Mason on 4/3/24. Please send Nulytely prep to patient's selected pharmacy selected during scheduling.    Thank you,  GI Preadmit Surgery Scheduler          Nulytely prep has been sent to patient pharmacy of choice.     Yes

## 2024-03-05 NOTE — ED ADULT NURSE REASSESSMENT NOTE - NS ED NURSE REASSESS COMMENT FT1
Thank you for coming in today!    Today we discussed your rash.  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  We will treat this with topical triamcinolone.  You can use this for 1 month, and then take a break for a month.    We will keep an eye on the mole on your leg.  We took a picture today.    Please follow up in 1 year.  
pt reavaluated and vital signs stable ekg done and blood cand s sent to lab wade catheter drining  clear urine  noted pt medicated with ceftriaxone ivpb as ordered pt admitted to floor awaiting bed assignment
pt reavaluated and vital signs stable medicated as ordered and wade catheter with good urine output of 1.000mls clear urine noted pt awaiting bed assignment

## 2024-07-15 NOTE — ED ADULT NURSE NOTE - PMH
Provider discussed disease process, treatment plan, medications,and discharge instructions.  Patient agrees with plan.  Any questions were answered.  Care plan reviewed with patient.   
Anginal pain    Enlarged prostate    HTN (hypertension)

## 2025-03-04 NOTE — PROVIDER CONTACT NOTE (OTHER) - DATE AND TIME:
27-Jun-2019 06:13 You can access the FollowMyHealth Patient Portal offered by [facility] by registering at the following website: [email or web address]. By joining [employer]’s FollowMyHealth portal, you will also be able to view your health information using other applications (apps) compatible with our system.